# Patient Record
Sex: FEMALE | Race: OTHER | Employment: FULL TIME | URBAN - METROPOLITAN AREA
[De-identification: names, ages, dates, MRNs, and addresses within clinical notes are randomized per-mention and may not be internally consistent; named-entity substitution may affect disease eponyms.]

---

## 2019-12-30 ENCOUNTER — HOSPITAL ENCOUNTER (INPATIENT)
Age: 44
LOS: 13 days | Discharge: HOME | DRG: 305 | End: 2020-01-12
Attending: EMERGENCY MEDICINE | Admitting: INTERNAL MEDICINE
Payer: COMMERCIAL

## 2019-12-30 ENCOUNTER — APPOINTMENT (OUTPATIENT)
Dept: GENERAL RADIOLOGY | Age: 44
DRG: 305 | End: 2019-12-30
Payer: COMMERCIAL

## 2019-12-30 PROBLEM — L08.9 FOOT INFECTION: Status: ACTIVE | Noted: 2019-12-30

## 2019-12-30 LAB
ANION GAP SERPL CALCULATED.3IONS-SCNC: 12 MMOL/L (ref 9–17)
BUN BLDV-MCNC: 25 MG/DL (ref 6–20)
BUN/CREAT BLD: ABNORMAL (ref 9–20)
C-REACTIVE PROTEIN: 89.2 MG/L (ref 0–5)
CALCIUM SERPL-MCNC: 9.1 MG/DL (ref 8.6–10.4)
CHLORIDE BLD-SCNC: 92 MMOL/L (ref 98–107)
CO2: 26 MMOL/L (ref 20–31)
CREAT SERPL-MCNC: 1.04 MG/DL (ref 0.5–0.9)
GFR AFRICAN AMERICAN: >60 ML/MIN
GFR NON-AFRICAN AMERICAN: 57 ML/MIN
GFR SERPL CREATININE-BSD FRML MDRD: ABNORMAL ML/MIN/{1.73_M2}
GFR SERPL CREATININE-BSD FRML MDRD: ABNORMAL ML/MIN/{1.73_M2}
GLUCOSE BLD-MCNC: 239 MG/DL (ref 65–105)
GLUCOSE BLD-MCNC: 387 MG/DL (ref 65–105)
GLUCOSE BLD-MCNC: 524 MG/DL (ref 65–105)
GLUCOSE BLD-MCNC: 619 MG/DL (ref 70–99)
GLUCOSE BLD-MCNC: 712 MG/DL (ref 70–99)
GLUCOSE BLD-MCNC: >600 MG/DL (ref 65–105)
HCT VFR BLD CALC: 40.6 % (ref 36.3–47.1)
HEMOGLOBIN: 13.5 G/DL (ref 11.9–15.1)
MCH RBC QN AUTO: 30.3 PG (ref 25.2–33.5)
MCHC RBC AUTO-ENTMCNC: 33.3 G/DL (ref 28.4–34.8)
MCV RBC AUTO: 91.2 FL (ref 82.6–102.9)
NRBC AUTOMATED: 0 PER 100 WBC
PDW BLD-RTO: 13 % (ref 11.8–14.4)
PLATELET # BLD: 242 K/UL (ref 138–453)
PMV BLD AUTO: 11.6 FL (ref 8.1–13.5)
POTASSIUM SERPL-SCNC: 4.1 MMOL/L (ref 3.7–5.3)
RBC # BLD: 4.45 M/UL (ref 3.95–5.11)
SEDIMENTATION RATE, ERYTHROCYTE: 116 MM (ref 0–20)
SODIUM BLD-SCNC: 130 MMOL/L (ref 135–144)
WBC # BLD: 7.6 K/UL (ref 3.5–11.3)

## 2019-12-30 PROCEDURE — 99223 1ST HOSP IP/OBS HIGH 75: CPT | Performed by: NURSE PRACTITIONER

## 2019-12-30 PROCEDURE — 73630 X-RAY EXAM OF FOOT: CPT

## 2019-12-30 PROCEDURE — 2580000003 HC RX 258: Performed by: PHYSICIAN ASSISTANT

## 2019-12-30 PROCEDURE — G0378 HOSPITAL OBSERVATION PER HR: HCPCS

## 2019-12-30 PROCEDURE — 85651 RBC SED RATE NONAUTOMATED: CPT

## 2019-12-30 PROCEDURE — 1200000000 HC SEMI PRIVATE

## 2019-12-30 PROCEDURE — 96376 TX/PRO/DX INJ SAME DRUG ADON: CPT

## 2019-12-30 PROCEDURE — 80048 BASIC METABOLIC PNL TOTAL CA: CPT

## 2019-12-30 PROCEDURE — 85027 COMPLETE CBC AUTOMATED: CPT

## 2019-12-30 PROCEDURE — 6370000000 HC RX 637 (ALT 250 FOR IP): Performed by: PHYSICIAN ASSISTANT

## 2019-12-30 PROCEDURE — 99284 EMERGENCY DEPT VISIT MOD MDM: CPT

## 2019-12-30 PROCEDURE — 6370000000 HC RX 637 (ALT 250 FOR IP): Performed by: NURSE PRACTITIONER

## 2019-12-30 PROCEDURE — 96374 THER/PROPH/DIAG INJ IV PUSH: CPT

## 2019-12-30 PROCEDURE — 86140 C-REACTIVE PROTEIN: CPT

## 2019-12-30 PROCEDURE — 82947 ASSAY GLUCOSE BLOOD QUANT: CPT

## 2019-12-30 PROCEDURE — 93971 EXTREMITY STUDY: CPT

## 2019-12-30 PROCEDURE — 6360000002 HC RX W HCPCS: Performed by: PHYSICIAN ASSISTANT

## 2019-12-30 PROCEDURE — 83036 HEMOGLOBIN GLYCOSYLATED A1C: CPT

## 2019-12-30 RX ORDER — DEXTROSE MONOHYDRATE 25 G/50ML
12.5 INJECTION, SOLUTION INTRAVENOUS PRN
Status: DISCONTINUED | OUTPATIENT
Start: 2019-12-30 | End: 2020-01-12 | Stop reason: HOSPADM

## 2019-12-30 RX ORDER — ACETAMINOPHEN 325 MG/1
650 TABLET ORAL EVERY 4 HOURS PRN
Status: DISCONTINUED | OUTPATIENT
Start: 2019-12-30 | End: 2020-01-12 | Stop reason: HOSPADM

## 2019-12-30 RX ORDER — CARVEDILOL 12.5 MG/1
12.5 TABLET ORAL 2 TIMES DAILY WITH MEALS
Status: DISCONTINUED | OUTPATIENT
Start: 2019-12-31 | End: 2019-12-31 | Stop reason: SDUPTHER

## 2019-12-30 RX ORDER — LISINOPRIL AND HYDROCHLOROTHIAZIDE 25; 20 MG/1; MG/1
1 TABLET ORAL DAILY
Status: DISCONTINUED | OUTPATIENT
Start: 2019-12-31 | End: 2020-01-06

## 2019-12-30 RX ORDER — VANCOMYCIN HYDROCHLORIDE 1 G/200ML
1000 INJECTION, SOLUTION INTRAVENOUS EVERY 12 HOURS
Status: DISCONTINUED | OUTPATIENT
Start: 2019-12-30 | End: 2019-12-31 | Stop reason: SDUPTHER

## 2019-12-30 RX ORDER — SIMVASTATIN 20 MG
20 TABLET ORAL NIGHTLY
Status: DISCONTINUED | OUTPATIENT
Start: 2019-12-30 | End: 2020-01-11

## 2019-12-30 RX ORDER — SODIUM CHLORIDE 9 MG/ML
INJECTION, SOLUTION INTRAVENOUS CONTINUOUS
Status: DISCONTINUED | OUTPATIENT
Start: 2019-12-30 | End: 2020-01-02

## 2019-12-30 RX ORDER — 0.9 % SODIUM CHLORIDE 0.9 %
1000 INTRAVENOUS SOLUTION INTRAVENOUS ONCE
Status: COMPLETED | OUTPATIENT
Start: 2019-12-30 | End: 2019-12-30

## 2019-12-30 RX ORDER — CARVEDILOL 12.5 MG/1
12.5 TABLET ORAL ONCE
Status: COMPLETED | OUTPATIENT
Start: 2019-12-30 | End: 2019-12-30

## 2019-12-30 RX ORDER — ASPIRIN 81 MG/1
81 TABLET, CHEWABLE ORAL DAILY
Status: DISCONTINUED | OUTPATIENT
Start: 2019-12-30 | End: 2020-01-12 | Stop reason: HOSPADM

## 2019-12-30 RX ORDER — NICOTINE POLACRILEX 4 MG
15 LOZENGE BUCCAL PRN
Status: DISCONTINUED | OUTPATIENT
Start: 2019-12-30 | End: 2020-01-12 | Stop reason: HOSPADM

## 2019-12-30 RX ORDER — DEXTROSE MONOHYDRATE 50 MG/ML
100 INJECTION, SOLUTION INTRAVENOUS PRN
Status: DISCONTINUED | OUTPATIENT
Start: 2019-12-30 | End: 2020-01-12 | Stop reason: HOSPADM

## 2019-12-30 RX ORDER — ONDANSETRON 2 MG/ML
4 INJECTION INTRAMUSCULAR; INTRAVENOUS EVERY 6 HOURS PRN
Status: DISCONTINUED | OUTPATIENT
Start: 2019-12-30 | End: 2020-01-12 | Stop reason: HOSPADM

## 2019-12-30 RX ORDER — INSULIN GLARGINE 100 [IU]/ML
0.25 INJECTION, SOLUTION SUBCUTANEOUS NIGHTLY
Status: DISCONTINUED | OUTPATIENT
Start: 2019-12-30 | End: 2019-12-31

## 2019-12-30 RX ORDER — LEVOTHYROXINE SODIUM 0.05 MG/1
50 TABLET ORAL DAILY
Status: DISCONTINUED | OUTPATIENT
Start: 2019-12-31 | End: 2020-01-11

## 2019-12-30 RX ORDER — IBUPROFEN 800 MG/1
800 TABLET ORAL EVERY 8 HOURS PRN
Status: DISCONTINUED | OUTPATIENT
Start: 2019-12-30 | End: 2020-01-04

## 2019-12-30 RX ORDER — ACETAMINOPHEN 325 MG/1
650 TABLET ORAL ONCE
Status: COMPLETED | OUTPATIENT
Start: 2019-12-30 | End: 2019-12-30

## 2019-12-30 RX ORDER — ACETAMINOPHEN 325 MG/1
650 TABLET ORAL EVERY 8 HOURS PRN
Status: DISCONTINUED | OUTPATIENT
Start: 2019-12-30 | End: 2019-12-30

## 2019-12-30 RX ORDER — LISINOPRIL AND HYDROCHLOROTHIAZIDE 25; 20 MG/1; MG/1
1 TABLET ORAL DAILY
Status: DISCONTINUED | OUTPATIENT
Start: 2019-12-30 | End: 2019-12-30

## 2019-12-30 RX ADMIN — IBUPROFEN 800 MG: 800 TABLET, FILM COATED ORAL at 23:06

## 2019-12-30 RX ADMIN — INSULIN LISPRO 7 UNITS: 100 INJECTION, SOLUTION INTRAVENOUS; SUBCUTANEOUS at 17:03

## 2019-12-30 RX ADMIN — ASPIRIN 81 MG 81 MG: 81 TABLET ORAL at 23:06

## 2019-12-30 RX ADMIN — INSULIN LISPRO 10 UNITS: 100 INJECTION, SOLUTION INTRAVENOUS; SUBCUTANEOUS at 15:50

## 2019-12-30 RX ADMIN — LISINOPRIL AND HYDROCHLOROTHIAZIDE 1 TABLET: 25; 20 TABLET ORAL at 17:35

## 2019-12-30 RX ADMIN — SODIUM CHLORIDE 1000 ML: 9 INJECTION, SOLUTION INTRAVENOUS at 17:03

## 2019-12-30 RX ADMIN — ACETAMINOPHEN 650 MG: 325 TABLET ORAL at 23:06

## 2019-12-30 RX ADMIN — INSULIN LISPRO 10 UNITS: 100 INJECTION, SOLUTION INTRAVENOUS; SUBCUTANEOUS at 19:25

## 2019-12-30 RX ADMIN — CARVEDILOL 12.5 MG: 12.5 TABLET, FILM COATED ORAL at 17:03

## 2019-12-30 RX ADMIN — INSULIN LISPRO 10 UNITS: 100 INJECTION, SOLUTION INTRAVENOUS; SUBCUTANEOUS at 18:20

## 2019-12-30 RX ADMIN — VANCOMYCIN HYDROCHLORIDE 1750 MG: 1 INJECTION, POWDER, LYOPHILIZED, FOR SOLUTION INTRAVENOUS at 17:03

## 2019-12-30 RX ADMIN — ACETAMINOPHEN 650 MG: 325 TABLET ORAL at 15:50

## 2019-12-30 RX ADMIN — SODIUM CHLORIDE 1000 ML: 9 INJECTION, SOLUTION INTRAVENOUS at 19:26

## 2019-12-30 RX ADMIN — SODIUM CHLORIDE 1000 ML: 9 INJECTION, SOLUTION INTRAVENOUS at 15:49

## 2019-12-30 ASSESSMENT — ENCOUNTER SYMPTOMS
EYE PAIN: 0
BACK PAIN: 0
RHINORRHEA: 0
WHEEZING: 0
SORE THROAT: 0
EYE ITCHING: 0
EYE DISCHARGE: 0
NAUSEA: 0
COUGH: 0
COLOR CHANGE: 1
VOMITING: 0
ABDOMINAL PAIN: 0

## 2019-12-30 ASSESSMENT — PAIN DESCRIPTION - FREQUENCY: FREQUENCY: CONTINUOUS

## 2019-12-30 ASSESSMENT — PAIN DESCRIPTION - ORIENTATION: ORIENTATION: RIGHT

## 2019-12-30 ASSESSMENT — PAIN SCALES - GENERAL
PAINLEVEL_OUTOF10: 8
PAINLEVEL_OUTOF10: 7
PAINLEVEL_OUTOF10: 7

## 2019-12-30 ASSESSMENT — PAIN DESCRIPTION - LOCATION: LOCATION: FOOT

## 2019-12-30 NOTE — ED PROVIDER NOTES
[facility]  ED  Emergency Department Encounter  Advanced Practice Provider     Pt Name: Chalino Youngblood  MRN: [id number]  [place] 1975  Date of evaluation: 12/30/19  PCP:  KAYLEE Wagner CNP    CHIEF COMPLAINT       Chief Complaint   Patient presents with    Foot Pain     complaining of right foot pain to bottom of foot near crease of great toe and lateral right foot, she states that she started to notice swelling to dorsal aspect of foot with mild redness and pain    Leg Pain     complaining of right lower extremity pain to right medial aspect        HISTORY OF PRESENT ILLNESS  (Location/Symptom, Timing/Onset,Context/Setting, Quality, Duration, Modifying Factors, Severity.)      Chalino Youngblood is a 38 y.o. female who presents with right foot pain and swelling. Patient states that last night she started noticing swelling to the dorsum of the right foot. Later in the evening she noticed some erythema and then today has been having some pain. She adamantly denies any injury to the foot. She does report that she has had a previous history of pulmonary embolism as well as clots in the venous system and arterial system of her legs. She reports that she does have some arterial stents placed with the last one being about 2 years ago. Patient states that she is supposed to be on anticoagulation but ran out of her medicine about 6 months ago and was unable to fill it due to insurance issues. Patient is diabetic, type II, has not been checking her sugar and again has been off of her metformin. She does report that she has been taking her blood pressure medicine however. Patient states that she is able to ambulate. She denies any fevers or chills.     PAST MEDICAL /SURGICAL / SOCIAL / FAMILY HISTORY      has a past medical history of Hyperlipidemia, Hypertension, Hypothyroidism, Type II or unspecified type diabetes mellitus without mention of complication, not stated as uncontrolled, carvedilol (COREG) 12.5 MG tablet Take 1 tablet by mouth 2 times daily (with meals) Not sure if using 7/25/16   Geronimo Clark MD   lisinopril-hydrochlorothiazide (PRINZIDE;ZESTORETIC) 20-25 MG per tablet Take 1 tablet by mouth daily 40/25 7/25/16   Geronimo Clark MD   metFORMIN (GLUCOPHAGE) 1000 MG tablet Take 1 tablet by mouth 2 times daily (with meals) 7/25/16   Geronimo Clark MD   glucose monitoring kit (FREESTYLE) monitoring kit 1 kit by Does not apply route daily as needed (monitor blood glucose levels) 7/25/16   Kartik Velasquez MD   Glucose Blood (BLOOD GLUCOSE TEST STRIPS) STRP Check blood sugars three times daily 7/25/16   Kartik Velasquez MD   benzocaine-menthol (CEPACOL SORE THROAT EX ST) 15-3.6 MG lozenge Take 1 lozenge by mouth 3 times daily as needed for Sore Throat 2/23/16   Braxton Cunningham,    ibuprofen (ADVIL;MOTRIN) 800 MG tablet Take 1 tablet by mouth every 8 hours as needed for Pain 1/16/16   Hao Clark,    aspirin 81 MG chewable tablet Take 1 tablet by mouth daily. 3/20/15   Precious Leal MD   gabapentin (NEURONTIN) 300 MG capsule Take 1 capsule by mouth 3 times daily. 3/20/15   Precious Leal MD   simvastatin (ZOCOR) 20 MG tablet Take 1 tablet by mouth nightly. 3/20/15   Precious Leal MD   nystatin (MYCOSTATIN) 653801 UNIT/GM powder Apply topically 4 times daily. 3/20/15   Precious Leal MD   Blood Glucose Monitoring Suppl (BLOOD GLUCOSE METER) KIT Test 3 times daily Dx: 250.00  Diabetes Mellitus Non-Insulin Dependent 3/20/15   Precious Leal MD   Blood Pressure KIT Use as directed 3/20/15   Precious Leal MD       patient's medication list has been reviewed as entered by the nursing staff. REVIEW OF SYSTEMS    (2-9 systems for level 4, 10 or more for level 5)      Review of Systems   Constitutional: Negative for chills and fever. HENT: Negative for ear pain, rhinorrhea and sore throat. Eyes: Negative for pain, discharge and itching. Respiratory: Negative for cough and wheezing. Cardiovascular: Negative for chest pain and palpitations. Gastrointestinal: Negative for abdominal pain, nausea and vomiting. Genitourinary: Negative for difficulty urinating and dysuria. Musculoskeletal: Positive for myalgias. Negative for back pain. Skin: Positive for color change and wound. Neurological: Negative for dizziness and headaches. Psychiatric/Behavioral: Negative for dysphoric mood. PHYSICAL EXAM  (up to 7 for level 4, 8 or more for level 5)      INITIAL VITALS:  height is 5' 1\" (1.549 m) and weight is 240 lb (108.9 kg). Her oral temperature is 98.2 °F (36.8 °C). Her blood pressure is 156/104 (abnormal) and her pulse is 94. Her respiration is 16 and oxygen saturation is 99%. Physical Exam  Constitutional:       Appearance: She is well-developed. She is not diaphoretic. HENT:      Head: Normocephalic and atraumatic. Right Ear: External ear normal.      Left Ear: External ear normal.   Eyes:      General: No scleral icterus. Right eye: No discharge. Left eye: No discharge. Neck:      Trachea: No tracheal deviation. Pulmonary:      Effort: Pulmonary effort is normal. No respiratory distress. Breath sounds: No stridor. Musculoskeletal: Normal range of motion. Comments: On the medial aspect of the right great toe there is a large area of erythema, well demarcated, there is some fluctuance on the plantar surface of the foot and over the first MTP joint. There is no active drainage. There is a healing wound to the lateral portion of the foot. There is no surrounding erythema. There is some erythema onto the dorsum of the foot in the webspace between the great toe and second toe. Dorsalis pedis pulse is palpable. There is pain on palpation of the posterior calf with no calf size discrepancy   Skin:     General: Skin is warm and dry.    Neurological:      Mental Status: She is alert and oriented to person, place, and time.       Coordination: Coordination normal.   Psychiatric:         Behavior: Behavior normal.           PLAN (LABS / IMAGING / EKG):  Orders Placed This Encounter   Procedures    VL DUP LOWER EXTREMITY VENOUS RIGHT    XR FOOT RIGHT (MIN 3 VIEWS)    CBC    BASIC METABOLIC PANEL    C-REACTIVE PROTEIN    Sedimentation Rate    Glucose, random    Hemoglobin A1C    Vital signs    Vital signs    HYPOGLYCEMIA TREATMENT: blood glucose less than 50 mg/dL and patient  ALERT and TOLERATING PO    HYPOGLYCEMIA TREATMENT: blood glucose less than 70 mg/dL and patient ALERT and TOLERATING PO    HYPOGLYCEMIA TREATMENT: blood glucose less than 70 mg/dL and patient NOT ALERT or NPO    Full Code    Pharmacy to Dose: Vancomycin    Inpatient consult to [address] [place] [address] St to Dose: Vancomycin    POCT Glucose    POC Glucose Fingerstick    POC Glucose Fingerstick    POCT glucose    POCT Glucose    POC Glucose Fingerstick    POCT Glucose    POC Glucose Fingerstick    POC Glucose Fingerstick    Insert peripheral IV    PATIENT STATUS (FROM ED OR OR/PROCEDURAL) Observation    PATIENT STATUS (FROM ED OR OR/PROCEDURAL) Inpatient       MEDICATIONS ORDERED:  Orders Placed This Encounter   Medications    0.9 % sodium chloride bolus    insulin lispro (HUMALOG) injection vial 10 Units    acetaminophen (TYLENOL) tablet 650 mg    vancomycin (VANCOCIN) 1750 mg in dextrose 5% 500 mL IVPB    aspirin chewable tablet 81 mg    levothyroxine (SYNTHROID) tablet 50 mcg    lisinopril-hydrochlorothiazide (PRINZIDE;ZESTORETIC) 20-25 MG per tablet 1 tablet    metFORMIN (GLUCOPHAGE) tablet 1,000 mg    simvastatin (ZOCOR) tablet 20 mg    DISCONTD: vancomycin (VANCOCIN) 1750 mg in dextrose 5% 500 mL IVPB    DISCONTD: acetaminophen (TYLENOL) tablet 650 mg    ibuprofen (ADVIL;MOTRIN) tablet 800 mg    DISCONTD: insulin lispro (HUMALOG) injection vial 0-6 Units    DISCONTD: insulin lispro (HUMALOG)

## 2019-12-30 NOTE — ED PROVIDER NOTES
[facility]     Emergency Department     Faculty Attestation        I performed a history and physical examination of the patient and discussed management with the resident. I reviewed the residents note and agree with the documented findings and plan of care. Any areas of disagreement are noted on the chart. I was personally present for the key portions of any procedures. I have documented in the chart those procedures where I was not present during the key portions. I have reviewed the emergency nurses triage note. I agree with the chief complaint, past medical history, past surgical history, allergies, medications, social and family history as documented unless otherwise noted below. For Physician Assistant/ Nurse Practitioner cases/documentation I have personally evaluated this patient and have completed at least one if not all key elements of the E/M (history, physical exam, and MDM). Additional findings are as noted. Vital Signs: BP: (!) 192/104  Pulse: 95  Resp: 16  Temp: 98.2 °F (36.8 °C) SpO2: 99 %  PCP:  Nelda Reza, APRN - CNP    Pertinent Comments:     Patient is a 44-year-old female with significant peripheral vascular disease with arterial stents in the past as well as DVTs in the past supposed to be anticoagulated on Eliquis as well as on Brilinta but not compliant with this secondary to possible insurance recently. Patient on her right foot does have area on the dorsum that appears to be cellulitic with strong DP/PT pulses palpated and no obvious arterial side clot. Plan: We will obtain x-ray of the area as well as Doppler of the lower extremity and baseline laboratories and very possible simple cellulitis    Critical Care  None      (Please note that portions of this note were completed with a voice recognition program. Efforts were made to edit the dictations but occasionally words are mis-transcribed.  Whenever words are

## 2019-12-31 ENCOUNTER — APPOINTMENT (OUTPATIENT)
Dept: MRI IMAGING | Age: 44
DRG: 305 | End: 2019-12-31
Payer: COMMERCIAL

## 2019-12-31 PROBLEM — Z86.718 H/O DEEP VENOUS THROMBOSIS: Status: ACTIVE | Noted: 2019-12-31

## 2019-12-31 PROBLEM — Z91.14 NON COMPLIANCE W MEDICATION REGIMEN: Status: ACTIVE | Noted: 2019-12-31

## 2019-12-31 PROBLEM — R73.9 HYPERGLYCEMIA: Status: ACTIVE | Noted: 2019-12-31

## 2019-12-31 LAB
C-REACTIVE PROTEIN: 151.5 MG/L (ref 0–5)
CULTURE: NORMAL
DIRECT EXAM: NORMAL
DIRECT EXAM: NORMAL
ESTIMATED AVERAGE GLUCOSE: 413 MG/DL
FOLATE: 13.5 NG/ML
GLUCOSE BLD-MCNC: 226 MG/DL (ref 65–105)
GLUCOSE BLD-MCNC: 252 MG/DL (ref 65–105)
GLUCOSE BLD-MCNC: 301 MG/DL (ref 65–105)
GLUCOSE BLD-MCNC: 343 MG/DL (ref 65–105)
GLUCOSE BLD-MCNC: 404 MG/DL (ref 65–105)
HBA1C MFR BLD: 16 % (ref 4–6)
Lab: NORMAL
Lab: NORMAL
PROCALCITONIN: 0.19 NG/ML
PTH INTACT: 63.22 PG/ML (ref 15–65)
SPECIMEN DESCRIPTION: NORMAL
SPECIMEN DESCRIPTION: NORMAL
THYROXINE, FREE: 0.46 NG/DL (ref 0.93–1.7)
TSH SERPL DL<=0.05 MIU/L-ACNC: 48.78 MIU/L (ref 0.3–5)
URIC ACID: 4 MG/DL (ref 2.4–5.7)
VITAMIN B-12: 708 PG/ML (ref 232–1245)
VITAMIN D 25-HYDROXY: <5 NG/ML (ref 30–100)

## 2019-12-31 PROCEDURE — 36415 COLL VENOUS BLD VENIPUNCTURE: CPT

## 2019-12-31 PROCEDURE — 1200000000 HC SEMI PRIVATE

## 2019-12-31 PROCEDURE — 6360000002 HC RX W HCPCS: Performed by: EMERGENCY MEDICINE

## 2019-12-31 PROCEDURE — 99232 SBSQ HOSP IP/OBS MODERATE 35: CPT | Performed by: FAMILY MEDICINE

## 2019-12-31 PROCEDURE — A9576 INJ PROHANCE MULTIPACK: HCPCS | Performed by: STUDENT IN AN ORGANIZED HEALTH CARE EDUCATION/TRAINING PROGRAM

## 2019-12-31 PROCEDURE — 82306 VITAMIN D 25 HYDROXY: CPT

## 2019-12-31 PROCEDURE — 86403 PARTICLE AGGLUT ANTBDY SCRN: CPT

## 2019-12-31 PROCEDURE — 96376 TX/PRO/DX INJ SAME DRUG ADON: CPT

## 2019-12-31 PROCEDURE — 84439 ASSAY OF FREE THYROXINE: CPT

## 2019-12-31 PROCEDURE — 6360000004 HC RX CONTRAST MEDICATION: Performed by: STUDENT IN AN ORGANIZED HEALTH CARE EDUCATION/TRAINING PROGRAM

## 2019-12-31 PROCEDURE — 6370000000 HC RX 637 (ALT 250 FOR IP): Performed by: PHYSICIAN ASSISTANT

## 2019-12-31 PROCEDURE — 82746 ASSAY OF FOLIC ACID SERUM: CPT

## 2019-12-31 PROCEDURE — 76937 US GUIDE VASCULAR ACCESS: CPT

## 2019-12-31 PROCEDURE — 2580000003 HC RX 258: Performed by: EMERGENCY MEDICINE

## 2019-12-31 PROCEDURE — 83970 ASSAY OF PARATHORMONE: CPT

## 2019-12-31 PROCEDURE — 99222 1ST HOSP IP/OBS MODERATE 55: CPT | Performed by: INTERNAL MEDICINE

## 2019-12-31 PROCEDURE — 87075 CULTR BACTERIA EXCEPT BLOOD: CPT

## 2019-12-31 PROCEDURE — 6370000000 HC RX 637 (ALT 250 FOR IP): Performed by: NURSE PRACTITIONER

## 2019-12-31 PROCEDURE — 87185 SC STD ENZYME DETCJ PER NZM: CPT

## 2019-12-31 PROCEDURE — 86140 C-REACTIVE PROTEIN: CPT

## 2019-12-31 PROCEDURE — 73720 MRI LWR EXTREMITY W/O&W/DYE: CPT

## 2019-12-31 PROCEDURE — 6360000002 HC RX W HCPCS: Performed by: FAMILY MEDICINE

## 2019-12-31 PROCEDURE — 96375 TX/PRO/DX INJ NEW DRUG ADDON: CPT

## 2019-12-31 PROCEDURE — 82947 ASSAY GLUCOSE BLOOD QUANT: CPT

## 2019-12-31 PROCEDURE — 87040 BLOOD CULTURE FOR BACTERIA: CPT

## 2019-12-31 PROCEDURE — 87070 CULTURE OTHR SPECIMN AEROBIC: CPT

## 2019-12-31 PROCEDURE — 87076 CULTURE ANAEROBE IDENT EACH: CPT

## 2019-12-31 PROCEDURE — 99221 1ST HOSP IP/OBS SF/LOW 40: CPT | Performed by: PODIATRIST

## 2019-12-31 PROCEDURE — 84550 ASSAY OF BLOOD/URIC ACID: CPT

## 2019-12-31 PROCEDURE — 84145 PROCALCITONIN (PCT): CPT

## 2019-12-31 PROCEDURE — 2580000003 HC RX 258: Performed by: NURSE PRACTITIONER

## 2019-12-31 PROCEDURE — 84443 ASSAY THYROID STIM HORMONE: CPT

## 2019-12-31 PROCEDURE — 6360000002 HC RX W HCPCS: Performed by: PHYSICIAN ASSISTANT

## 2019-12-31 PROCEDURE — 2580000003 HC RX 258: Performed by: PHYSICIAN ASSISTANT

## 2019-12-31 PROCEDURE — 82607 VITAMIN B-12: CPT

## 2019-12-31 PROCEDURE — 87205 SMEAR GRAM STAIN: CPT

## 2019-12-31 PROCEDURE — 6370000000 HC RX 637 (ALT 250 FOR IP): Performed by: FAMILY MEDICINE

## 2019-12-31 PROCEDURE — 87147 CULTURE TYPE IMMUNOLOGIC: CPT

## 2019-12-31 RX ORDER — SODIUM CHLORIDE 0.9 % (FLUSH) 0.9 %
10 SYRINGE (ML) INJECTION EVERY 12 HOURS SCHEDULED
Status: DISCONTINUED | OUTPATIENT
Start: 2019-12-31 | End: 2020-01-05 | Stop reason: SDUPTHER

## 2019-12-31 RX ORDER — SODIUM CHLORIDE 0.9 % (FLUSH) 0.9 %
10 SYRINGE (ML) INJECTION PRN
Status: DISCONTINUED | OUTPATIENT
Start: 2019-12-31 | End: 2020-01-12 | Stop reason: HOSPADM

## 2019-12-31 RX ORDER — SODIUM CHLORIDE 0.9 % (FLUSH) 0.9 %
10 SYRINGE (ML) INJECTION 2 TIMES DAILY
Status: DISCONTINUED | OUTPATIENT
Start: 2019-12-31 | End: 2020-01-05 | Stop reason: SDUPTHER

## 2019-12-31 RX ORDER — POTASSIUM CHLORIDE 20 MEQ/1
40 TABLET, EXTENDED RELEASE ORAL PRN
Status: DISCONTINUED | OUTPATIENT
Start: 2019-12-31 | End: 2020-01-12 | Stop reason: HOSPADM

## 2019-12-31 RX ORDER — NICOTINE 21 MG/24HR
1 PATCH, TRANSDERMAL 24 HOURS TRANSDERMAL DAILY PRN
Status: DISCONTINUED | OUTPATIENT
Start: 2019-12-31 | End: 2020-01-12 | Stop reason: HOSPADM

## 2019-12-31 RX ORDER — POTASSIUM CHLORIDE 7.45 MG/ML
10 INJECTION INTRAVENOUS PRN
Status: DISCONTINUED | OUTPATIENT
Start: 2019-12-31 | End: 2020-01-12 | Stop reason: HOSPADM

## 2019-12-31 RX ORDER — SODIUM CHLORIDE 9 MG/ML
INJECTION, SOLUTION INTRAVENOUS CONTINUOUS
Status: DISCONTINUED | OUTPATIENT
Start: 2019-12-31 | End: 2020-01-11

## 2019-12-31 RX ORDER — INSULIN GLARGINE 100 [IU]/ML
40 INJECTION, SOLUTION SUBCUTANEOUS NIGHTLY
Status: DISCONTINUED | OUTPATIENT
Start: 2019-12-31 | End: 2020-01-08

## 2019-12-31 RX ORDER — MAGNESIUM SULFATE 1 G/100ML
1 INJECTION INTRAVENOUS PRN
Status: DISCONTINUED | OUTPATIENT
Start: 2019-12-31 | End: 2020-01-12 | Stop reason: HOSPADM

## 2019-12-31 RX ORDER — CARVEDILOL 12.5 MG/1
12.5 TABLET ORAL 2 TIMES DAILY WITH MEALS
Status: DISCONTINUED | OUTPATIENT
Start: 2019-12-31 | End: 2020-01-11

## 2019-12-31 RX ADMIN — LISINOPRIL AND HYDROCHLOROTHIAZIDE 1 TABLET: 25; 20 TABLET ORAL at 13:45

## 2019-12-31 RX ADMIN — ASPIRIN 81 MG 81 MG: 81 TABLET ORAL at 10:37

## 2019-12-31 RX ADMIN — APIXABAN 2.5 MG: 2.5 TABLET, FILM COATED ORAL at 21:09

## 2019-12-31 RX ADMIN — SODIUM CHLORIDE, PRESERVATIVE FREE 10 ML: 5 INJECTION INTRAVENOUS at 10:46

## 2019-12-31 RX ADMIN — METFORMIN HYDROCHLORIDE 1000 MG: 500 TABLET ORAL at 10:37

## 2019-12-31 RX ADMIN — SIMVASTATIN 20 MG: 20 TABLET, FILM COATED ORAL at 21:09

## 2019-12-31 RX ADMIN — APIXABAN 2.5 MG: 2.5 TABLET, FILM COATED ORAL at 00:11

## 2019-12-31 RX ADMIN — VANCOMYCIN HYDROCHLORIDE 1750 MG: 10 INJECTION, POWDER, LYOPHILIZED, FOR SOLUTION INTRAVENOUS at 18:22

## 2019-12-31 RX ADMIN — INSULIN LISPRO 12 UNITS: 100 INJECTION, SOLUTION INTRAVENOUS; SUBCUTANEOUS at 18:22

## 2019-12-31 RX ADMIN — APIXABAN 2.5 MG: 2.5 TABLET, FILM COATED ORAL at 10:37

## 2019-12-31 RX ADMIN — HYDROMORPHONE HYDROCHLORIDE 0.5 MG: 1 INJECTION, SOLUTION INTRAMUSCULAR; INTRAVENOUS; SUBCUTANEOUS at 21:10

## 2019-12-31 RX ADMIN — SIMVASTATIN 20 MG: 20 TABLET, FILM COATED ORAL at 01:10

## 2019-12-31 RX ADMIN — SODIUM CHLORIDE, PRESERVATIVE FREE 10 ML: 5 INJECTION INTRAVENOUS at 21:11

## 2019-12-31 RX ADMIN — INSULIN LISPRO 3 UNITS: 100 INJECTION, SOLUTION INTRAVENOUS; SUBCUTANEOUS at 00:10

## 2019-12-31 RX ADMIN — HYDROMORPHONE HYDROCHLORIDE 0.5 MG: 1 INJECTION, SOLUTION INTRAMUSCULAR; INTRAVENOUS; SUBCUTANEOUS at 14:26

## 2019-12-31 RX ADMIN — INSULIN LISPRO 6 UNITS: 100 INJECTION, SOLUTION INTRAVENOUS; SUBCUTANEOUS at 21:10

## 2019-12-31 RX ADMIN — INSULIN GLARGINE 27 UNITS: 100 INJECTION, SOLUTION SUBCUTANEOUS at 00:09

## 2019-12-31 RX ADMIN — LEVOTHYROXINE SODIUM 50 MCG: 100 TABLET ORAL at 10:38

## 2019-12-31 RX ADMIN — INSULIN LISPRO 18 UNITS: 100 INJECTION, SOLUTION INTRAVENOUS; SUBCUTANEOUS at 12:59

## 2019-12-31 RX ADMIN — SODIUM CHLORIDE: 9 INJECTION, SOLUTION INTRAVENOUS at 01:27

## 2019-12-31 RX ADMIN — INSULIN GLARGINE 40 UNITS: 100 INJECTION, SOLUTION SUBCUTANEOUS at 21:09

## 2019-12-31 RX ADMIN — CARVEDILOL 12.5 MG: 12.5 TABLET, FILM COATED ORAL at 18:21

## 2019-12-31 RX ADMIN — SODIUM CHLORIDE: 9 INJECTION, SOLUTION INTRAVENOUS at 10:46

## 2019-12-31 RX ADMIN — INSULIN LISPRO 9 UNITS: 100 INJECTION, SOLUTION INTRAVENOUS; SUBCUTANEOUS at 10:38

## 2019-12-31 RX ADMIN — GADOTERIDOL 20 ML: 279.3 INJECTION, SOLUTION INTRAVENOUS at 17:24

## 2019-12-31 RX ADMIN — CARVEDILOL 12.5 MG: 12.5 TABLET, FILM COATED ORAL at 10:37

## 2019-12-31 RX ADMIN — PIPERACILLIN AND TAZOBACTAM 3.38 G: 3; .375 INJECTION, POWDER, FOR SOLUTION INTRAVENOUS at 01:27

## 2019-12-31 ASSESSMENT — PAIN SCALES - GENERAL
PAINLEVEL_OUTOF10: 10
PAINLEVEL_OUTOF10: 9

## 2019-12-31 NOTE — ED PROVIDER NOTES
Patient is a diabetic with sugars over 700 patient has fluid and insulin admitted at this time with antibiotics for cellulitis patient also receiving Pip/tazo and podiatry consult     Freddie Rivera DO  12/31/19 0240

## 2019-12-31 NOTE — H&P
[facility]    HISTORY AND PHYSICAL EXAMINATION            Date:   12/31/2019  Patient name:  Chalino Youngblood  Date of admission:  12/30/2019  2:09 PM  MRN:   [id number]  Account:  [de-identified]  YOB: 1975  PCP:    KAYLEE Wagner CNP  Room:   02/02  Code Status:    Full Code    Chief Complaint:     Chief Complaint   Patient presents with    Foot Pain     complaining of right foot pain to bottom of foot near crease of great toe and lateral right foot, she states that she started to notice swelling to dorsal aspect of foot with mild redness and pain    Leg Pain     complaining of right lower extremity pain to right medial aspect        History Obtained From:     patient, electronic medical record    History of Present Illness:     Chalino Youngblood is a 38 y.o. / female who presents with Foot Pain (complaining of right foot pain to bottom of foot near crease of great toe and lateral right foot, she states that she started to notice swelling to dorsal aspect of foot with mild redness and pain) and Leg Pain (complaining of right lower extremity pain to right medial aspect )   and is admitted to the hospital for the management of Foot infection. Patient is a 38 yr old female who presents to ED with c/p Right foot pain. Right great toe noted to be deep red/pruple in color with swelling extending into dorsum of foot along with red/purple coloring. Pulses are palpable, brisk cap refill, and warmth noted. Ms. Ree Aceves denies any injury to the affected toe/foot, denies any fevers, chills, N/V/D andstates the symptoms have been ongoing for approx 24 hrs. She has baseline neuropathy and denies any changes in N/T. Presenting labs with elevated inflammatory markers along with significant Hyperglycemia, no gas on radiological imaging and doppler negative for DVT.     PMH significant for DVT (non-compliant with Xarelto secondary to MD   benzocaine-menthol (CEPACOL SORE THROAT EX ST) 15-3.6 MG lozenge Take 1 lozenge by mouth 3 times daily as needed for Sore Throat 2/23/16   Shawn Pate, DO   ibuprofen (ADVIL;MOTRIN) 800 MG tablet Take 1 tablet by mouth every 8 hours as needed for Pain 1/16/16   Shaista Lozaan, DO   aspirin 81 MG chewable tablet Take 1 tablet by mouth daily. 3/20/15   Priti Vizcarra MD   gabapentin (NEURONTIN) 300 MG capsule Take 1 capsule by mouth 3 times daily. 3/20/15   Priti Vizcarra MD   simvastatin (ZOCOR) 20 MG tablet Take 1 tablet by mouth nightly. 3/20/15   Priti Vizcarra MD   nystatin (MYCOSTATIN) 734154 UNIT/GM powder Apply topically 4 times daily. 3/20/15   Priti Vizcarra MD   Blood Glucose Monitoring Suppl (BLOOD GLUCOSE METER) KIT Test 3 times daily Dx: 250.00  Diabetes Mellitus Non-Insulin Dependent 3/20/15   Priti Vizacrra MD   Blood Pressure KIT Use as directed 3/20/15   Priti Vizcarra MD        Allergies:     Patient has no known allergies. Social History:     Tobacco:    reports that she has quit smoking. She smoked 0.10 packs per day. She does not have any smokeless tobacco history on file. Alcohol:      reports no history of alcohol use. Drug Use:  reports no history of drug use. Family History:     Family History   Problem Relation Age of Onset    Stroke Father     Heart Disease Other        Review of Systems:     Positive and Negative as described in HPI.     CONSTITUTIONAL:  negative for fevers, chills, sweats, fatigue, weight loss  HEENT:  negative for vision, hearing changes, runny nose, throat pain  RESPIRATORY:  negative for shortness of breath, cough, congestion, wheezing  CARDIOVASCULAR:  negative for chest pain, palpitations  GASTROINTESTINAL:  negative for nausea, vomiting, diarrhea, constipation, change in bowel habits, abdominal pain   GENITOURINARY:  negative for difficulty of urination, burning with urination, frequency   INTEGUMENT:  negative for rash, skin lesions, easy bruising   HEMATOLOGIC/LYMPHATIC:  negative for swelling/edema   ALLERGIC/IMMUNOLOGIC:  negative for urticaria , itching  ENDOCRINE:  negative increase in drinking, increase in urination, hot or cold intolerance  MUSCULOSKELETAL:  Right foot pian, with swelling and right great toe with color changes  NEUROLOGICAL:  negative for headaches, dizziness, lightheadedness, numbness, pain, tingling extremities  BEHAVIOR/PSYCH:  negative for depression, anxiety    Physical Exam:   BP (!) 156/104   Pulse 94   Temp 98.2 °F (36.8 °C) (Oral)   Resp 16   Ht 5' 1\" (1.549 m)   Wt 240 lb (108.9 kg)   SpO2 99%   BMI 45.35 kg/m²   Temp (24hrs), Av.2 °F (36.8 °C), Min:98.2 °F (36.8 °C), Max:98.2 °F (36.8 °C)    Recent Labs     19  1900 19  2019 19  2209 19  0009   POCGLU 524* 387* 239* 226*     No intake or output data in the 24 hours ending 19 0036    General Appearance: alert, well appearing, and in no acute distress  Mental status: oriented to person, place, and time  Head: normocephalic, atraumatic  Eye: no icterus, redness, pupils equal and reactive, extraocular eye movements intact, conjunctiva clear  Ear: normal external ear, no discharge, hearing intact  Nose: no drainage noted  Mouth: mucous membranes moist  Neck: supple, no carotid bruits, thyroid not palpable  Lungs: Bilateral equal air entry, clear to ausculation, no wheezing, rales or rhonchi, normal effort  Cardiovascular: normal rate, regular rhythm, no murmur, gallop, rub  Abdomen: Soft, nontender, nondistended, normal bowel sounds, no hepatomegaly or splenomegaly  Neurologic: There are no new focal motor or sensory deficits, normal muscle tone and bulk, no abnormal sensation, normal speech, cranial nerves II through XII grossly intact  Skin: No gross lesions, rashes, bruising or bleeding on exposed skin area  Extremities: peripheral pulses palpable, 1+ edema noted to right foot along with purple/red discoloring  Psych: normal affect    Investigations:      Laboratory Testing:  Recent Results (from the past 24 hour(s))   CBC    Collection Time: 12/30/19  3:03 PM   Result Value Ref Range    WBC 7.6 3.5 - 11.3 k/uL    RBC 4.45 3.95 - 5.11 m/uL    Hemoglobin 13.5 11.9 - 15.1 g/dL    Hematocrit 40.6 36.3 - 47.1 %    MCV 91.2 82.6 - 102.9 fL    MCH 30.3 25.2 - 33.5 pg    MCHC 33.3 28.4 - 34.8 g/dL    RDW 13.0 11.8 - 14.4 %    Platelets 807 240 - 592 k/uL    MPV 11.6 8.1 - 13.5 fL    NRBC Automated 0.0 0.0 per 100 WBC   BASIC METABOLIC PANEL    Collection Time: 12/30/19  3:03 PM   Result Value Ref Range    Glucose 712 (HH) 70 - 99 mg/dL    BUN 25 (H) 6 - 20 mg/dL    CREATININE 1.04 (H) 0.50 - 0.90 mg/dL    Bun/Cre Ratio NOT REPORTED 9 - 20    Calcium 9.1 8.6 - 10.4 mg/dL    Sodium 130 (L) 135 - 144 mmol/L    Potassium 4.1 3.7 - 5.3 mmol/L    Chloride 92 (L) 98 - 107 mmol/L    CO2 26 20 - 31 mmol/L    Anion Gap 12 9 - 17 mmol/L    GFR Non-African American 57 (L) >60 mL/min    GFR African American >60 >60 mL/min    GFR Comment          GFR Staging NOT REPORTED    C-REACTIVE PROTEIN    Collection Time: 12/30/19  3:03 PM   Result Value Ref Range    CRP 89.2 (H) 0.0 - 5.0 mg/L   Sedimentation Rate    Collection Time: 12/30/19  3:03 PM   Result Value Ref Range    Sed Rate 116 (H) 0 - 20 mm   POC Glucose Fingerstick    Collection Time: 12/30/19  4:37 PM   Result Value Ref Range    POC Glucose >600 (HH) 65 - 105 mg/dL   Glucose, random    Collection Time: 12/30/19  5:35 PM   Result Value Ref Range    Glucose 619 (HH) 70 - 99 mg/dL   POC Glucose Fingerstick    Collection Time: 12/30/19  7:00 PM   Result Value Ref Range    POC Glucose 524 (HH) 65 - 105 mg/dL   POC Glucose Fingerstick    Collection Time: 12/30/19  8:19 PM   Result Value Ref Range    POC Glucose 387 (H) 65 - 105 mg/dL   POC Glucose Fingerstick    Collection Time: 12/30/19 10:09 PM   Result Value Ref Range    POC Glucose 239 (H) 65 - 105 mg/dL   POC Glucose Fingerstick    Collection and most importantly because of direct risk to patient if care not provided in a hospital setting.     KAYLEE Perera NP  12/31/2019  12:36 AM    Copy sent to KAYLEE Villalobos - CNP

## 2019-12-31 NOTE — PROGRESS NOTES
[facility]    Progress Note    12/31/2019    11:41 AM    Name:   Shantell Angel  MRN:     [id number]     Acct:      [de-identified]   Room:   2026/2026-01  IP Day:  1  Admit Date:  12/30/2019  2:09 PM    PCP:   KAYLEE Alvarado CNP  Code Status:  Full Code    Subjective:     C/C:   Chief Complaint   Patient presents with    Foot Pain     complaining of right foot pain to bottom of foot near crease of great toe and lateral right foot, she states that she started to notice swelling to dorsal aspect of foot with mild redness and pain    Leg Pain     complaining of right lower extremity pain to right medial aspect      Interval History Status: improved. Pain 10 out of 10 and states that her pain is not well controlled at all. Not well controlled A1c is 13. Brief History:     Shantell Angel is a 38 y.o. / female who presents with Foot Pain (complaining of right foot pain to bottom of foot near crease of great toe and lateral right foot, she states that she started to notice swelling to dorsal aspect of foot with mild redness and pain) and Leg Pain (complaining of right lower extremity pain to right medial aspect )   and is admitted to the hospital for the management of Foot infection.     Patient is a 38 yr old female who presents to ED with c/p Right foot pain. Right great toe noted to be deep red/pruple in color with swelling extending into dorsum of foot along with red/purple coloring. Pulses are palpable, brisk cap refill, and warmth noted. Ms. Phoebe Gutiérrez denies any injury to the affected toe/foot, denies any fevers, chills, N/V/D andstates the symptoms have been ongoing for approx 24 hrs. She has baseline neuropathy and denies any changes in N/T. Presenting labs with elevated inflammatory markers along with significant Hyperglycemia, no gas on radiological imaging and doppler negative for DVT.      PMH significant for DVT Date/Time    SPECIAL NOT REPORTED 02/23/2016 01:27 PM     Lab Results   Component Value Date/Time    CULTURE (A) 12/27/2013 10:37 PM     STREPTOCOCCI, BETA HEMOLYTIC GROUP B 10 to 50,000 CFU/ML    CULTURE  12/27/2013 10:37 PM     [facility] [address] [facility] [address]  [phone]       Radiology:  Xr Foot Right (min 3 Views)    Result Date: 12/30/2019  Soft tissue edema surrounding the 1st digit without evidence for radiopaque foreign body or subcutaneous air. Physical Examination:        General appearance:  alert, cooperative and no distress  Mental Status:  oriented to person, place and time and normal affect  Lungs:  clear to auscultation bilaterally, normal effort  Heart:  regular rate and rhythm, no murmur  Abdomen:  soft, nontender, nondistended, normal bowel sounds, no masses, hepatomegaly, splenomegaly  Extremities:  no edema, redness, tenderness in the calves  Skin: Swelling of the first digit right foot. Assessment:        Hospital Problems           Last Modified POA    * (Principal) Foot infection 12/31/2019 Yes    Hypertension 12/31/2019 Yes    Morbid obesity due to excess calories ([address] 12/31/2019 Yes    Essential hypertension 12/31/2019 Yes    Hypothyroidism 12/31/2019 Yes    Diabetic polyneuropathy ([address] 12/31/2019 Yes    Type 2 diabetes mellitus with neurologic complication, with long-term current use of insulin ([address] 12/31/2019 Yes    Chronic diastolic congestive heart failure ([address] 12/31/2019 Yes    Hyperglycemia 12/31/2019 Yes    Non compliance w medication regimen 12/31/2019 Yes    H/O deep venous thrombosis 12/31/2019 Yes          Plan:        1. Cellulitis foot infection. Continue antibiotics vancomycin and Zosyn. 2. Hypertension fairly well-controlled continue current management. 3. Diabetes mellitus not well controlled will increase Lantus to 40 units to new insulin sliding scale. 4. We will get TSH continue Synthroid.   5. Will get procalcitonin, parathyroid

## 2019-12-31 NOTE — ED NOTES
Report given to Jann MATOS on [facility], nurse denies any further questions.  Pt still waiting on clean bad      HWilfrid Young RN  12/31/19 0536

## 2019-12-31 NOTE — CONSULTS
[facility] - Initial Consult Note  Today's Date and Time: 12-31-19, 11:56 AM    Impression :   · Rt great toe diabetic infection  · S/P I&D per Podiatry 12-31  · DM II not controlled  · CAD s/p stenting x 2  · Non compliance    Recommendations:   · Stop Vancomycin  · Start Cefazolin 2gm IV q 8   · Start Diflucan 200 mg po daily    Medical Decision Making/Summary/Discussion:1/2/2020     ·   Infection Control Recommendations   · Penelope Precautions    Antimicrobial Stewardship Recommendations     · Targeted therapy  · IV to oral conversion  · PK dosing    Coordination of Outpatient Care:   · Estimated Length of IV antimicrobials: TBD  · Patient will need Midline Catheter Insertion:  No  · Patient will need PICC line Insertion: No  · Patient will need: Home IV , [place],  SNF,  LTAC: No  · Patient will need outpatient wound care: TBD    Chief complaint/reason for consultation:   · Diabetic foot infection, uncontrolled DM      History of Present Illness:   Jan Garcias is a 38y.o.-year-old  female who was initially admitted on 12/30/2019. Patient seen at the request of Dr. Reddy Cords:    Pt is a 37 yo woman visiting from [place]. She noted some Rt great toe pain on 12-24. By 12-30 the pain had progressed and her foot was swollen and tender so she presented to the ED for further evaluation. Pt also has a history of CAD with multiple stents in place, PE and IDDM. She reports that she lost her insurance 6 months ago and has not been taking her Brilinta, Eliquis or Insulin as a result. She denies any chills, fevers, night sweats or malaise prior to presenting to the ED. In the ED she was found to have abscess at the base of the Rt great toe with erythema and fluctuance noted. The wound did not penetrate to the bone, no induration or crepitus noted.       , .5    Pt was admitted, started on Zosyn and Vancomycin, and seen by unspecified type diabetes mellitus without mention of complication, not stated as uncontrolled     Urinary incontinence        Past Surgical  History:     Past Surgical History:   Procedure Laterality Date     SECTION      2 times    CHOLECYSTECTOMY      TOENAIL EXCISION         Medications:      sodium chloride flush  10 mL Intravenous BID    carvedilol  12.5 mg Oral BID WC    sodium chloride flush  10 mL Intravenous 2 times per day    sodium chloride flush  10 mL Intravenous 2 times per day    vancomycin  1,750 mg Intravenous Q24H    vancomycin (VANCOCIN) intermittent dosing (placeholder)   Other RX Placeholder    insulin glargine  40 Units Subcutaneous Nightly    sodium chloride flush  10 mL Intravenous BID    aspirin  81 mg Oral Daily    levothyroxine  50 mcg Oral Daily    lisinopril-hydrochlorothiazide  1 tablet Oral Daily    metFORMIN  1,000 mg Oral BID WC    simvastatin  20 mg Oral Nightly    apixaban  2.5 mg Oral BID    insulin lispro  0-18 Units Subcutaneous TID WC    insulin lispro  0-9 Units Subcutaneous Nightly       Social History:     Social History     Socioeconomic History    Marital status:      Spouse name: Not on file    Number of children: Not on file    Years of education: Not on file    Highest education level: Not on file   Occupational History    Not on file   Social Needs    Financial resource strain: Not on file    Food insecurity:     Worry: Not on file     Inability: Not on file    Transportation needs:     Medical: Not on file     Non-medical: Not on file   Tobacco Use    Smoking status: Former Smoker     Packs/day: 0.10   Substance and Sexual Activity    Alcohol use: No    Drug use: No    Sexual activity: Not on file   Lifestyle    Physical activity:     Days per week: Not on file     Minutes per session: Not on file    Stress: Not on file   Relationships    Social connections:     Talks on phone: Not on file     Gets together: Not on ANTERIOR TENDONS: The tibialis anterior, extensor hallucis longus and   extensor digitorum longus tendons are normal in position, morphology and   signal.       TARSAL TUNNEL: There are no obstructing lesions within the tarsal tunnel.       MISCELLANEOUS: Circumferential subcutaneous edema about the ankle and along   the dorsum of the midfoot.  No soft tissue ulceration, sinus tract, or   drainable fluid collection.  No abnormal soft tissue mass.           Impression   1. No osteomyelitis or other acute osseous abnormality of the hindfoot.    2. Circumferential subcutaneous edema about the ankle and along the dorsum of   the midfoot compatible with bland edema versus cellulitis.  No deep soft   tissue ulceration or drainable fluid collection.         EXAMINATION:   MRI OF THE RIGHT FOREFOOT WITHOUT AND WITH CONTRAST, 1/2/2020 10:00 am       TECHNIQUE:   Multiplanar multisequence MRI of the right forefoot was performed without and   with the administration of intravenous contrast.       COMPARISON:   12/31/2019 hindfoot MRI       HISTORY:   ORDERING SYSTEM PROVIDED HISTORY: Wound   TECHNOLOGIST PROVIDED HISTORY:   Wound   What is the area of interest?->Forefoot   Is the patient pregnant?->No       Wound at the plantar aspect of the great toe.       FINDINGS:   There is mild subcutaneous edema in the dorsal and plantar forefoot.  Focal   confluent soft tissue edema measuring approximately 1.9 x 0.8 x 2.1 cm is   seen in the soft tissues at the plantar aspect of the proximal great toe,   superficial to the flexor tendon, adjacent to the proximal phalanx.  There is   appears to be intervening fat on the T1 weighted sequences.  No definite   organized fluid collection identified in the soft tissues.  No abnormal   enhancement.  There are a couple low signal foci within the plantar soft   tissues of the great toe, somewhat linear appearance, suggesting a vascular   structure, but possibility of soft tissue gas is not

## 2019-12-31 NOTE — CONSULTS
Consultation Note  Podiatric Medicine and Surgery     Subjective     Chief Complaint: Right foot pain     HPI:  Rigo Steele is a 38 y.o. female with a past medical history of DM2 with neuropathy [facility]. Pt states that about 1 week ago her right great toe became very painful and this morning she noted worsening pain and new redness of her right great toe. Pt denies any trauma or stepping on anything sharp. She notes her redness progressively has moved up towards her ankle. Patient is originally from [place] and has moved here, she currently does not have her insulin. Pt is also complaining of calf pain. She has a history of a DVT and PE. Pt denies any N/V/F/C/CP/SOB. PCP is KAYLEE Mas CNP    ROS:   Review of Systems   Constitutional: Negative for chills and fever. Respiratory: Negative for shortness of breath. Cardiovascular: Positive for leg swelling. Negative for chest pain. Gastrointestinal: Negative for abdominal pain, constipation, diarrhea, nausea and vomiting. Musculoskeletal: Positive for arthralgias and myalgias. Negative for gait problem and joint swelling. Skin: Positive for color change and wound. Neurological: Positive for numbness. Negative for weakness. Past Medical History   has a past medical history of Hyperlipidemia, Hypertension, Hypothyroidism, Type II or unspecified type diabetes mellitus without mention of complication, not stated as uncontrolled, and Urinary incontinence. Past Surgical History   has a past surgical history that includes  section; Cholecystectomy; and toenail excision. Medications  Prior to Admission medications    Medication Sig Start Date End Date Taking?  Authorizing Provider   glipiZIDE (GLUCOTROL) 10 MG tablet Take 2 tablets by mouth 2 times daily (before meals) 16   Minus MD Lorrie   levothyroxine (SYNTHROID) 50 MCG tablet Take 1 tablet by mouth daily 16 Artemio Rose MD   carvedilol (COREG) 12.5 MG tablet Take 1 tablet by mouth 2 times daily (with meals) Not sure if using 7/25/16   Artemio Rose MD   lisinopril-hydrochlorothiazide (PRINZIDE;ZESTORETIC) 20-25 MG per tablet Take 1 tablet by mouth daily 40/25 7/25/16   Artemio Rose MD   metFORMIN (GLUCOPHAGE) 1000 MG tablet Take 1 tablet by mouth 2 times daily (with meals) 7/25/16   Artemio Rose MD   glucose monitoring kit (FREESTYLE) monitoring kit 1 kit by Does not apply route daily as needed (monitor blood glucose levels) 7/25/16   Lana Parker MD   Glucose Blood (BLOOD GLUCOSE TEST STRIPS) STRP Check blood sugars three times daily 7/25/16   Lana Parker MD   benzocaine-menthol (CEPACOL SORE THROAT EX ST) 15-3.6 MG lozenge Take 1 lozenge by mouth 3 times daily as needed for Sore Throat 2/23/16   Marina Gamez DO   ibuprofen (ADVIL;MOTRIN) 800 MG tablet Take 1 tablet by mouth every 8 hours as needed for Pain 1/16/16   Larissa Briones,    aspirin 81 MG chewable tablet Take 1 tablet by mouth daily. 3/20/15   Connro Donaldson MD   gabapentin (NEURONTIN) 300 MG capsule Take 1 capsule by mouth 3 times daily. 3/20/15   Connor Donaldson MD   simvastatin (ZOCOR) 20 MG tablet Take 1 tablet by mouth nightly. 3/20/15   Connor Donaldson MD   nystatin (MYCOSTATIN) 511646 UNIT/GM powder Apply topically 4 times daily.  3/20/15   Connor Donaldson MD   Blood Glucose Monitoring Suppl (BLOOD GLUCOSE METER) KIT Test 3 times daily Dx: 250.00  Diabetes Mellitus Non-Insulin Dependent 3/20/15   Connor Donaldson MD   Blood Pressure KIT Use as directed 3/20/15   Connor Donaldson MD    Scheduled Meds:   carvedilol  12.5 mg Oral BID WC    sodium chloride flush  10 mL Intravenous 2 times per day    sodium chloride flush  10 mL Intravenous 2 times per day    vancomycin  1,750 mg Intravenous Q24H    vancomycin (VANCOCIN) intermittent dosing (placeholder)   Other RX Placeholder    insulin glargine  40 [Urine:700]    CBC:  Recent Labs     12/30/19  1503 12/31/19  1843   WBC 7.6  --    HGB 13.5  --    HCT 40.6  --      --    CRP 89.2* 151.5*        BMP:  Recent Labs     12/30/19  1503 12/30/19  1735   *  --    K 4.1  --    CL 92*  --    CO2 26  --    BUN 25*  --    CREATININE 1.04*  --    GLUCOSE 712* 619*   CALCIUM 9.1  --         Coags:  No results for input(s): APTT, PROT, INR in the last 72 hours. Lab Results   Component Value Date    LABA1C 16.0 (H) 12/30/2019     Lab Results   Component Value Date    SEDRATE 116 (H) 12/30/2019     Lab Results   Component Value Date    .5 (H) 12/31/2019         Lower Extremity Physical Exam:  Vascular: DP and PT pulses are palpable. CFT <3 seconds to all digits. Hair growth is absent to the level of the digits. +1 pitting edema to the right foot    Neuro: Saph/sural/SP/DP/plantar sensation diminished to light touch. Musculoskeletal: Muscle strength is 5/5 to all lower extremity muscle groups on Left, deferred on the right. Gross deformity is absent. Tenderness to touch the plantar and dorsal aspect of the right hallux and posterior calf. Painful ROM of right ankle    Dermatologic: Full thickness pinpoint ulcer #1 located plantar sulcus of right hallux and measures approximately 0.1 cm x 0.1 cm x 0.5 cm. Base is granular. Periwound skin is hyperkeratotic. Serosanguinous drainage noted with no associated mal odor. Erythema present with streaking up the dorsal aspect of the right foot with associated increase in warmth. Fluctuance noted plantar lateral aspect of right hallux sulcus. Does not probe to bone, sinus track, or undermine. No fluctuance, crepitus, or induration. Interdigital maceration absent. Left foot is warm and dry with no open wounds    Clinical Images:              Imaging:   MRI FOOT RIGHT W WO CONTRAST   Final Result   1. No osteomyelitis or other acute osseous abnormality of the hindfoot.    2. Circumferential subcutaneous

## 2019-12-31 NOTE — CARE COORDINATION
Case Management Initial Discharge Plan  [place],             Met with:patient to discuss discharge plans. Information verified: address, contacts, phone number, , insurance Yes  PCP: KAYLEE Stratton CNP  Date of last visit: Few months ago    Insurance Provider: [facility]     Discharge Planning    Living Arrangements:  Children   Support Systems:  Family Members, [id number] has 1 stories  3 stairs to climb to get into front door, 0stairs to climb to reach second floor  Location of bedroom/bathroom in home 0    Patient able to perform ADL's:Independent    Current Services (outpatient & in home) none   DME equipment: na  DME provider: aydee      Potential Assistance Needed:  N/A    Patient agreeable to home care: No  [place] of choice provided:  n/a    Prior SNF/Rehab Placement and Facility: none   Agreeable to SNF/Rehab: No  [place] of choice provided: n/a   Evaluation: n/a    Expected Discharge date:     Patient expects to be discharged to:  home  Follow Up Appointment: Best Day/ Time:      Transportation provider: Self or family   Transportation arrangements needed for discharge: No    Readmission Risk              Risk of Unplanned Readmission:        0             Does patient have a readmission risk score greater than 14?: DOMINIQUE  If yes, follow-up appointment must be made within 7 days of discharge.      Goals of Care: Get better       Discharge Plan: Home Independently           Electronically signed by Benjie Castorena RN on 19 at 8:01 PM

## 2019-12-31 NOTE — ED NOTES
Pt resting on stretcher. NAD noted. RR even and nonlabored. Call light within reach. Will continue to monitor.        Letha Lemos RN  12/30/19 1624

## 2019-12-31 NOTE — PROGRESS NOTES
Pharmacy Note  Vancomycin Consult    Angelica Haider is a 38 y.o. female started on Vancomycin for cellulitis; consult received from  :Mariella Burciaga to manage therapy. Also receiving the following antibiotics: none. Patient Active Problem List   Diagnosis    Hypertension    Morbid obesity due to excess calories ([facility] [address]    Essential hypertension    Hypothyroidism    Diabetic polyneuropathy ([facility] [address]    Type 2 diabetes mellitus with neurologic complication, with long-term current use of insulin (HCC)    Accelerated hypertension    Keratitis, herpetic    Chronic diastolic congestive heart failure (HCC)    Acute right eye pain    Foot infection       Allergies:  Patient has no known allergies. Temp max: 98.2    Recent Labs     12/30/19  1503   BUN 25*       Recent Labs     12/30/19  1503   CREATININE 1.04*       Recent Labs     12/30/19  1503   WBC 7.6       No intake or output data in the 24 hours ending 12/31/19 0006    Culture Date      Source                       Results  pending    Ht Readings from Last 1 Encounters:   12/30/19 5' 1\" (1.549 m)        Wt Readings from Last 1 Encounters:   12/30/19 240 lb (108.9 kg)         Body mass index is 45.35 kg/m². Estimated Creatinine Clearance: 78 mL/min (A) (based on SCr of 1.04 mg/dL (H)). Goal Trough Level: 10-15 mcg/mL    Assessment/Plan:  Will initiate vancomycin 1750 mg IV every 24 hours. Timing of trough level will be determined based on culture results, renal function, and clinical response. Thank you for the consult. Will continue to follow.     Raquel Hawthorne [facility]  12/31/2019 12:09 AM

## 2019-12-31 NOTE — ED PROVIDER NOTES
[facility] ED  Emergency Department  Emergency Medicine Resident Sign-out     Care of Rola Aguilar was assumed from Dr. Sonali Lennon and is being seen for Foot Pain (complaining of right foot pain to bottom of foot near crease of great toe and lateral right foot, she states that she started to notice swelling to dorsal aspect of foot with mild redness and pain) and Leg Pain (complaining of right lower extremity pain to right medial aspect )  . The patient's initial evaluation and plan have been discussed with the prior provider who initially evaluated the patient.      EMERGENCY DEPARTMENT COURSE / MEDICAL DECISION MAKING:       MEDICATIONS GIVEN:  Orders Placed This Encounter   Medications    0.9 % sodium chloride bolus    insulin lispro (HUMALOG) injection vial 10 Units    acetaminophen (TYLENOL) tablet 650 mg    vancomycin (VANCOCIN) 1750 mg in dextrose 5% 500 mL IVPB    aspirin chewable tablet 81 mg    levothyroxine (SYNTHROID) tablet 50 mcg    lisinopril-hydrochlorothiazide (PRINZIDE;ZESTORETIC) 20-25 MG per tablet 1 tablet    metFORMIN (GLUCOPHAGE) tablet 1,000 mg    simvastatin (ZOCOR) tablet 20 mg    DISCONTD: vancomycin (VANCOCIN) 1750 mg in dextrose 5% 500 mL IVPB    DISCONTD: acetaminophen (TYLENOL) tablet 650 mg    ibuprofen (ADVIL;MOTRIN) tablet 800 mg    DISCONTD: insulin lispro (HUMALOG) injection vial 0-6 Units    DISCONTD: insulin lispro (HUMALOG) injection vial 0-3 Units    0.9 % sodium chloride infusion    apixaban (ELIQUIS) tablet 2.5 mg    carvedilol (COREG) tablet 12.5 mg    DISCONTD: lisinopril-hydrochlorothiazide (PRINZIDE;ZESTORETIC) 20-25 MG per tablet 1 tablet    0.9 % sodium chloride bolus    insulin lispro (HUMALOG) injection vial 7 Units    insulin lispro (HUMALOG) injection vial 10 Units    insulin lispro (HUMALOG) injection vial 10 Units    0.9 % sodium chloride bolus    carvedilol (COREG) tablet 12.5 mg    DISCONTD: vancomycin (VANCOCIN) 1000 mg Velocities are measured in cm/s ; Diameters are measured in cm Right Lower Extremities DVT Study Measurements Right 2D Measurements +------------------------------------+----------+---------------+----------+ ! Location                            ! Visualized! Compressibility! Thrombosis! +------------------------------------+----------+---------------+----------+ ! Common Femoral                      !Yes       ! Yes            ! None      ! +------------------------------------+----------+---------------+----------+ ! Prox Femoral                        !Yes       ! Yes            ! None      ! +------------------------------------+----------+---------------+----------+ ! Mid Femoral                         !Yes       ! Yes            ! None      ! +------------------------------------+----------+---------------+----------+ ! Dist Femoral                        !Yes       ! Yes            ! None      ! +------------------------------------+----------+---------------+----------+ ! Deep Femoral                        !No        !               !          ! +------------------------------------+----------+---------------+----------+ ! Popliteal                           !Partial   !Yes            ! None      ! +------------------------------------+----------+---------------+----------+ ! Sapheno Femoral Junction            ! Yes       ! Yes            ! None      ! +------------------------------------+----------+---------------+----------+ ! PTV                                 ! Yes       ! Yes            ! None      ! +------------------------------------+----------+---------------+----------+ ! Peroneal                            !Partial   !Yes            ! None      ! +------------------------------------+----------+---------------+----------+ ! Gastroc                             ! Yes       ! Yes            ! None      ! +------------------------------------+----------+---------------+----------+ ! GSV Thigh                           ! Yes !Yes            !None      ! +------------------------------------+----------+---------------+----------+ ! GSV Knee                            ! Yes       ! Yes            ! None      ! +------------------------------------+----------+---------------+----------+ ! GSV Ankle                           ! Yes       ! Yes            ! None      ! +------------------------------------+----------+---------------+----------+ ! SSV                                 ! Yes       ! Yes            ! None      ! +------------------------------------+----------+---------------+----------+ Right Doppler Measurements +---------------------------+------+------+--------------------------------+ ! Location                   ! Signal!Reflux! Reflux (msec)                   ! +---------------------------+------+------+--------------------------------+ ! Common Femoral             !Phasic!      !                                ! +---------------------------+------+------+--------------------------------+ ! Prox Femoral               !Phasic!      !                                ! +---------------------------+------+------+--------------------------------+ ! Popliteal                  !Phasic!      !                                ! +---------------------------+------+------+--------------------------------+      RECENT VITALS:     Temp: 98.2 °F (36.8 °C),  Pulse: 92, Resp: 20, BP: (!) 156/94, SpO2: 94 %    This patient is a 38 y.o. Female with sudden with swelling of right lower extremity, off her anticoagulants for some time due to insurance issues, also off her insulin. Found to be hyperglycemic with glucose in 700s, given 30 units insulin, glucose now in the 100s. Labs significant for mildly elevated CRP and ESR. Venous Dopplers negative for clot, x-ray showed no signs of potential osteomyelitis. Patient started on vancomycin and Zosyn for suspected cellulitis with possible abscess. Patient admitted to [facility].       ED Course as of Dec 31 0200 Mon Dec 30, 2019   1533 Glucose(!!): 712 [TEGAN]   2001 8:01 PM  Was informed by coordinator that [facility] has refused. Jv Guerrero, care coordinator has refused observation placement of the patient. Will call intermed    [TEGAN]      ED Course User Index  [TEGAN] Gail Overton PA-C         OUTSTANDING TASKS / RECOMMENDATIONS:    1. bed     FINAL IMPRESSION:     1. Cellulitis of foot    2.  Hyperglycemia        DISPOSITION:         DISPOSITION:  []  Discharge   []  Transfer -    [x]  Admission -  interemd   []  Against Medical Advice   []  Eloped   FOLLOW-UP: Vicky Claros, APRN - CNP  [address] [facility]  [phone]           DISCHARGE MEDICATIONS: New Prescriptions    No medications on file          [place]ol [place]  Emergency Medicine Resident  [address] [facility]        [place]ol [place]  Resident  12/31/19 8182

## 2020-01-01 LAB
ANION GAP SERPL CALCULATED.3IONS-SCNC: 11 MMOL/L (ref 9–17)
BUN BLDV-MCNC: 14 MG/DL (ref 6–20)
BUN/CREAT BLD: ABNORMAL (ref 9–20)
CALCIUM SERPL-MCNC: 7.9 MG/DL (ref 8.6–10.4)
CHLORIDE BLD-SCNC: 98 MMOL/L (ref 98–107)
CO2: 23 MMOL/L (ref 20–31)
CREAT SERPL-MCNC: 1.07 MG/DL (ref 0.5–0.9)
GFR AFRICAN AMERICAN: >60 ML/MIN
GFR NON-AFRICAN AMERICAN: 55 ML/MIN
GFR SERPL CREATININE-BSD FRML MDRD: ABNORMAL ML/MIN/{1.73_M2}
GFR SERPL CREATININE-BSD FRML MDRD: ABNORMAL ML/MIN/{1.73_M2}
GLUCOSE BLD-MCNC: 287 MG/DL (ref 65–105)
GLUCOSE BLD-MCNC: 289 MG/DL (ref 65–105)
GLUCOSE BLD-MCNC: 290 MG/DL (ref 70–99)
GLUCOSE BLD-MCNC: 295 MG/DL (ref 65–105)
GLUCOSE BLD-MCNC: 334 MG/DL (ref 65–105)
HCT VFR BLD CALC: 34.5 % (ref 36.3–47.1)
HEMOGLOBIN: 11 G/DL (ref 11.9–15.1)
MCH RBC QN AUTO: 29.8 PG (ref 25.2–33.5)
MCHC RBC AUTO-ENTMCNC: 31.9 G/DL (ref 28.4–34.8)
MCV RBC AUTO: 93.5 FL (ref 82.6–102.9)
NRBC AUTOMATED: 0 PER 100 WBC
PDW BLD-RTO: 13.3 % (ref 11.8–14.4)
PLATELET # BLD: 235 K/UL (ref 138–453)
PMV BLD AUTO: 11.4 FL (ref 8.1–13.5)
POTASSIUM SERPL-SCNC: 3.8 MMOL/L (ref 3.7–5.3)
RBC # BLD: 3.69 M/UL (ref 3.95–5.11)
SODIUM BLD-SCNC: 132 MMOL/L (ref 135–144)
WBC # BLD: 8.9 K/UL (ref 3.5–11.3)

## 2020-01-01 PROCEDURE — 6370000000 HC RX 637 (ALT 250 FOR IP): Performed by: FAMILY MEDICINE

## 2020-01-01 PROCEDURE — 6370000000 HC RX 637 (ALT 250 FOR IP): Performed by: PHYSICIAN ASSISTANT

## 2020-01-01 PROCEDURE — 99232 SBSQ HOSP IP/OBS MODERATE 35: CPT | Performed by: FAMILY MEDICINE

## 2020-01-01 PROCEDURE — 36415 COLL VENOUS BLD VENIPUNCTURE: CPT

## 2020-01-01 PROCEDURE — 6370000000 HC RX 637 (ALT 250 FOR IP): Performed by: NURSE PRACTITIONER

## 2020-01-01 PROCEDURE — 6360000002 HC RX W HCPCS: Performed by: EMERGENCY MEDICINE

## 2020-01-01 PROCEDURE — 80048 BASIC METABOLIC PNL TOTAL CA: CPT

## 2020-01-01 PROCEDURE — 82947 ASSAY GLUCOSE BLOOD QUANT: CPT

## 2020-01-01 PROCEDURE — 6360000002 HC RX W HCPCS: Performed by: FAMILY MEDICINE

## 2020-01-01 PROCEDURE — 2580000003 HC RX 258: Performed by: EMERGENCY MEDICINE

## 2020-01-01 PROCEDURE — 1200000000 HC SEMI PRIVATE

## 2020-01-01 PROCEDURE — 2580000003 HC RX 258: Performed by: NURSE PRACTITIONER

## 2020-01-01 PROCEDURE — 85027 COMPLETE CBC AUTOMATED: CPT

## 2020-01-01 RX ORDER — DIPHENHYDRAMINE HCL 25 MG
25 TABLET ORAL EVERY 6 HOURS PRN
Status: DISCONTINUED | OUTPATIENT
Start: 2020-01-01 | End: 2020-01-12 | Stop reason: HOSPADM

## 2020-01-01 RX ADMIN — INSULIN LISPRO 9 UNITS: 100 INJECTION, SOLUTION INTRAVENOUS; SUBCUTANEOUS at 08:44

## 2020-01-01 RX ADMIN — APIXABAN 2.5 MG: 2.5 TABLET, FILM COATED ORAL at 08:43

## 2020-01-01 RX ADMIN — APIXABAN 2.5 MG: 2.5 TABLET, FILM COATED ORAL at 20:22

## 2020-01-01 RX ADMIN — HYDROMORPHONE HYDROCHLORIDE 0.5 MG: 1 INJECTION, SOLUTION INTRAMUSCULAR; INTRAVENOUS; SUBCUTANEOUS at 05:56

## 2020-01-01 RX ADMIN — DIPHENHYDRAMINE HCL 25 MG: 25 TABLET ORAL at 12:44

## 2020-01-01 RX ADMIN — SIMVASTATIN 20 MG: 20 TABLET, FILM COATED ORAL at 20:22

## 2020-01-01 RX ADMIN — INSULIN LISPRO 9 UNITS: 100 INJECTION, SOLUTION INTRAVENOUS; SUBCUTANEOUS at 17:54

## 2020-01-01 RX ADMIN — METFORMIN HYDROCHLORIDE 1000 MG: 500 TABLET ORAL at 08:42

## 2020-01-01 RX ADMIN — ASPIRIN 81 MG 81 MG: 81 TABLET ORAL at 08:43

## 2020-01-01 RX ADMIN — INSULIN LISPRO 6 UNITS: 100 INJECTION, SOLUTION INTRAVENOUS; SUBCUTANEOUS at 21:19

## 2020-01-01 RX ADMIN — VANCOMYCIN HYDROCHLORIDE 1750 MG: 10 INJECTION, POWDER, LYOPHILIZED, FOR SOLUTION INTRAVENOUS at 17:56

## 2020-01-01 RX ADMIN — CARVEDILOL 12.5 MG: 12.5 TABLET, FILM COATED ORAL at 17:56

## 2020-01-01 RX ADMIN — SODIUM CHLORIDE, PRESERVATIVE FREE 10 ML: 5 INJECTION INTRAVENOUS at 21:20

## 2020-01-01 RX ADMIN — LEVOTHYROXINE SODIUM 50 MCG: 100 TABLET ORAL at 08:43

## 2020-01-01 RX ADMIN — METFORMIN HYDROCHLORIDE 1000 MG: 500 TABLET ORAL at 20:22

## 2020-01-01 RX ADMIN — LISINOPRIL AND HYDROCHLOROTHIAZIDE 1 TABLET: 25; 20 TABLET ORAL at 08:43

## 2020-01-01 RX ADMIN — INSULIN LISPRO 9 UNITS: 100 INJECTION, SOLUTION INTRAVENOUS; SUBCUTANEOUS at 12:44

## 2020-01-01 RX ADMIN — HYDROMORPHONE HYDROCHLORIDE 0.5 MG: 1 INJECTION, SOLUTION INTRAMUSCULAR; INTRAVENOUS; SUBCUTANEOUS at 01:40

## 2020-01-01 RX ADMIN — CARVEDILOL 12.5 MG: 12.5 TABLET, FILM COATED ORAL at 08:43

## 2020-01-01 RX ADMIN — INSULIN GLARGINE 40 UNITS: 100 INJECTION, SOLUTION SUBCUTANEOUS at 21:19

## 2020-01-01 RX ADMIN — HYDROMORPHONE HYDROCHLORIDE 0.5 MG: 1 INJECTION, SOLUTION INTRAMUSCULAR; INTRAVENOUS; SUBCUTANEOUS at 21:19

## 2020-01-01 RX ADMIN — HYDROMORPHONE HYDROCHLORIDE 0.5 MG: 1 INJECTION, SOLUTION INTRAMUSCULAR; INTRAVENOUS; SUBCUTANEOUS at 13:20

## 2020-01-01 RX ADMIN — SODIUM CHLORIDE: 9 INJECTION, SOLUTION INTRAVENOUS at 09:30

## 2020-01-01 RX ADMIN — HYDROMORPHONE HYDROCHLORIDE 0.5 MG: 1 INJECTION, SOLUTION INTRAMUSCULAR; INTRAVENOUS; SUBCUTANEOUS at 17:54

## 2020-01-01 ASSESSMENT — PAIN SCALES - GENERAL
PAINLEVEL_OUTOF10: 9
PAINLEVEL_OUTOF10: 8
PAINLEVEL_OUTOF10: 9

## 2020-01-01 NOTE — PLAN OF CARE
Problem: Falls - Risk of:  Goal: Will remain free from falls  Description  Will remain free from falls  Outcome: Ongoing  Goal: Absence of physical injury  Description  Absence of physical injury  Outcome: Ongoing     Problem: Skin Integrity:  Goal: Demonstration of wound healing without infection will improve  Description  Demonstration of wound healing without infection will improve  Outcome: Ongoing  Goal: Complications related to intravenous access or infusion will be avoided or minimized  Description  Complications related to intravenous access or infusion will be avoided or minimized  Outcome: Ongoing  Goal: Will show no infection signs and symptoms  Description  Will show no infection signs and symptoms  Outcome: Ongoing  Goal: Absence of new skin breakdown  Description  Absence of new skin breakdown  Outcome: Ongoing     Problem: Pain:  Goal: Pain level will decrease  Description  Pain level will decrease  Outcome: Ongoing  Goal: Control of acute pain  Description  Control of acute pain  Outcome: Ongoing  Goal: Control of chronic pain  Description  Control of chronic pain  Outcome: Ongoing

## 2020-01-01 NOTE — PROGRESS NOTES
[facility] 18    Progress Note    1/1/2020    1:12 PM    Name:   Sammy Parra  MRN:     [id number]     Acct:      [de-identified]   Room:   2026/2026-01  IP Day:  2  Admit Date:  12/30/2019  2:09 PM    PCP:   KAYLEE Akins CNP  Code Status:  Full Code    Subjective:     C/C:   Chief Complaint   Patient presents with    Foot Pain     complaining of right foot pain to bottom of foot near crease of great toe and lateral right foot, she states that she started to notice swelling to dorsal aspect of foot with mild redness and pain    Leg Pain     complaining of right lower extremity pain to right medial aspect      Interval History Status: improved. Patient is from [place] and she was visiting family and ended up with cellulitis. Brief History:     Sammy Parra is a 38 y.o. / female who presents with Foot Pain (complaining of right foot pain to bottom of foot near crease of great toe and lateral right foot, she states that she started to notice swelling to dorsal aspect of foot with mild redness and pain) and Leg Pain (complaining of right lower extremity pain to right medial aspect )   and is admitted to the hospital for the management of Foot infection.     Patient is a 38 yr old female who presents to ED with c/p Right foot pain. Right great toe noted to be deep red/pruple in color with swelling extending into dorsum of foot along with red/purple coloring. Pulses are palpable, brisk cap refill, and warmth noted. Ms. Sagar Purcell denies any injury to the affected toe/foot, denies any fevers, chills, N/V/D andstates the symptoms have been ongoing for approx 24 hrs. She has baseline neuropathy and denies any changes in N/T. Presenting labs with elevated inflammatory markers along with significant Hyperglycemia, no gas on radiological imaging and doppler negative for DVT.      PMH significant for DVT (non-compliant with Xarelto secondary to expiring insurance), DMII controlled with oral agents (off all oral agents secondary to insurance issues), HTN, TIA, hypothyroidism, PANCHO (non-compliant with CPAP). Last ECHO done in 2015: EF:55%, LVH with Diastolic Dysfunction     On PE, patient is resting in bed, awake alert and oriented. C/o pain 4/10 ro RLE, Discoloration and swelling noted to left great toe and into foot with adequate pulses/flow. Denies any additional symptoms, VSS and no clinical signs of distress. Review of Systems:     Constitutional:  negative for chills, fevers, sweats  Respiratory:  negative for cough, dyspnea on exertion, hemoptysis, shortness of breath, wheezing  Cardiovascular:  negative for chest pain, chest pressure/discomfort, lower extremity edema, palpitations  Gastrointestinal:  negative for abdominal pain, constipation, diarrhea, nausea, vomiting  Neurological:  negative for dizziness, headache    Medications:      Allergies:  No Known Allergies    Current Meds:   Scheduled Meds:    carvedilol  12.5 mg Oral BID WC    sodium chloride flush  10 mL Intravenous 2 times per day    sodium chloride flush  10 mL Intravenous 2 times per day    vancomycin  1,750 mg Intravenous Q24H    vancomycin (VANCOCIN) intermittent dosing (placeholder)   Other RX Placeholder    insulin glargine  40 Units Subcutaneous Nightly    sodium chloride flush  10 mL Intravenous BID    aspirin  81 mg Oral Daily    levothyroxine  50 mcg Oral Daily    lisinopril-hydrochlorothiazide  1 tablet Oral Daily    metFORMIN  1,000 mg Oral BID WC    simvastatin  20 mg Oral Nightly    apixaban  2.5 mg Oral BID    insulin lispro  0-18 Units Subcutaneous TID     insulin lispro  0-9 Units Subcutaneous Nightly     Continuous Infusions:    sodium chloride 75 mL/hr at 12/31/19 1046    sodium chloride 100 mL/hr at 12/31/19 0127    dextrose       PRN Meds: diphenhydrAMINE, sodium chloride flush, sodium chloride flush, potassium chloride **OR** potassium alternative oral replacement **OR** potassium chloride, magnesium sulfate, magnesium hydroxide, nicotine, HYDROmorphone, ibuprofen, ondansetron, acetaminophen, glucose, dextrose, glucagon (rDNA), dextrose    Data:     Past Medical History:   has a past medical history of Hyperlipidemia, Hypertension, Hypothyroidism, Type II or unspecified type diabetes mellitus without mention of complication, not stated as uncontrolled, and Urinary incontinence. Social History:   reports that she has quit smoking. She smoked 0.10 packs per day. She does not have any smokeless tobacco history on file. She reports that she does not drink alcohol or use drugs. Family History:   Family History   Problem Relation Age of Onset    Stroke Father     Heart Disease Other        Vitals:  /79   Pulse 85   Temp 98.8 °F (37.1 °C) (Oral)   Resp 17   Ht 5' 1\" (1.549 m)   Wt 270 lb 14.6 oz (122.9 kg)   SpO2 98%   BMI 51.19 kg/m²   Temp (24hrs), Av.2 °F (37.3 °C), Min:98.8 °F (37.1 °C), Max:99.5 °F (37.5 °C)    Recent Labs     19  1815 19  2019 20  0730 20  1140   POCGLU 301* 343* 287* 295*       I/O (24Hr):     Intake/Output Summary (Last 24 hours) at 2020 1312  Last data filed at 2020 0657  Gross per 24 hour   Intake 2013.75 ml   Output 1900 ml   Net 113.75 ml       Labs:  Hematology:  Recent Labs     19  1503 19  1843 20  0540   WBC 7.6  --  8.9   RBC 4.45  --  3.69*   HGB 13.5  --  11.0*   HCT 40.6  --  34.5*   MCV 91.2  --  93.5   MCH 30.3  --  29.8   MCHC 33.3  --  31.9   RDW 13.0  --  13.3     --  235   MPV 11.6  --  11.4   SEDRATE 116*  --   --    CRP 89.2* 151.5*  --      Chemistry:  Recent Labs     19  1503 19  1735 20  0540   *  --  132*   K 4.1  --  3.8   CL 92*  --  98   CO2 26  --  23   GLUCOSE 712* 619* 290*   BUN 25*  --  14   CREATININE 1.04*  --  1.07*   ANIONGAP 12  --  11   LABGLOM 57*  --  55*   GFRAA >60  --  >60 CALCIUM 9.1  --  7.9*     Recent Labs     12/30/19  1503  12/31/19  0944 12/31/19  1237 12/31/19  1815 12/31/19  1843 12/31/19  2019 01/01/20  0730 01/01/20  1140   LABA1C 16.0*  --   --   --   --   --   --   --   --    TSH  --   --   --   --   --  48.78*  --   --   --    URICACID  --   --   --   --   --  4.0  --   --   --    POCGLU  --    < > 252* 404* 301*  --  343* 287* 295*    < > = values in this interval not displayed. ABG:  Lab Results   Component Value Date    FIO2 INFORMATION NOT PROVIDED 03/21/2015     Lab Results   Component Value Date/Time    SPECIAL NOT REPORTED 12/31/2019 12:22 PM    SPECIAL NOT REPORTED 12/31/2019 12:22 PM     Lab Results   Component Value Date/Time    CULTURE DUPLICATE ORDER 02/18/3062 12:22 PM       Radiology:  [place] Foot Right (min 3 Views)    Result Date: 12/30/2019  Soft tissue edema surrounding the 1st digit without evidence for radiopaque foreign body or subcutaneous air. Physical Examination:        General appearance:  alert, cooperative and no distress  Mental Status:  oriented to person, place and time and normal affect  Lungs:  clear to auscultation bilaterally, normal effort  Heart:  regular rate and rhythm, no murmur  Abdomen:  soft, nontender, nondistended, normal bowel sounds, no masses, hepatomegaly, splenomegaly  Extremities:  no edema, redness, tenderness in the calves  Skin: Swelling of the first digit right foot.     Assessment:        Hospital Problems           Last Modified POA    * (Principal) Foot infection 12/31/2019 Yes    Hypertension 12/31/2019 Yes    Morbid obesity due to excess calories ([address] 12/31/2019 Yes    Essential hypertension 12/31/2019 Yes    Hypothyroidism 12/31/2019 Yes    Diabetic polyneuropathy ([address] 12/31/2019 Yes    Type 2 diabetes mellitus with neurologic complication, with long-term current use of insulin ([address] 12/31/2019 Yes    Chronic diastolic congestive heart failure ([address] 12/31/2019 Yes    Hyperglycemia 12/31/2019 Yes    Non compliance w medication regimen 12/31/2019 Yes    H/O deep venous thrombosis 12/31/2019 Yes    Cellulitis of foot 1/1/2020 Yes          Plan:        1. Cellulitis foot infection. Continue antibiotics vancomycin and Zosyn. 2. Hypertension fairly well-controlled continue current management. 3. Diabetes mellitus not well controlled will increase Lantus to 40 units to new insulin sliding scale. 4. We will get TSH continue Synthroid. 5. Will get procalcitonin, parathyroid hormone, uric acid  6. Get MRI and if it is positive for osteomyelitis will consult infectious disease.   7. DVT GI prophylaxis    Ekaterina Alexandra MD  1/1/2020  1:12 PM

## 2020-01-01 NOTE — PROGRESS NOTES
Infusions:   sodium chloride 75 mL/hr at 19 1046    sodium chloride 100 mL/hr at 19 0127    dextrose         PRN Meds:sodium chloride flush, sodium chloride flush, potassium chloride **OR** potassium alternative oral replacement **OR** potassium chloride, magnesium sulfate, magnesium hydroxide, nicotine, HYDROmorphone, ibuprofen, ondansetron, acetaminophen, glucose, dextrose, glucagon (rDNA), dextrose    Objective     Vitals:  No data found. Average, Min, and Max for last 24 hours Vitals:  TEMPERATURE:  Temp  Av.3 °F (37.4 °C)  Min: 99 °F (37.2 °C)  Max: 99.5 °F (37.5 °C)    RESPIRATIONS RANGE: Resp  Av  Min: 15  Max: 20    PULSE RANGE: Pulse  Av.7  Min: 90  Max: 94    BLOOD PRESSURE RANGE:  Systolic (21URO), XYR:673 , Min:152 , LQB:195   ; Diastolic (39DPM), AQW:31, Min:70, Max:92      PULSE OXIMETRY RANGE: SpO2  Av.7 %  Min: 96 %  Max: 97 %    I/O last 3 completed shifts:  In: -   Out: 700 [Urine:700]    CBC:  Recent Labs     19  1503 19  1843 20  0540   WBC 7.6  --  8.9   HGB 13.5  --  11.0*   HCT 40.6  --  34.5*     --  235   CRP 89.2* 151.5*  --         BMP:  Recent Labs     19  1503 19  1735 20  0540   *  --  132*   K 4.1  --  3.8   CL 92*  --  98   CO2 26  --  23   BUN 25*  --  14   CREATININE 1.04*  --  1.07*   GLUCOSE 712* 619* 290*   CALCIUM 9.1  --  7.9*        Coags:  No results for input(s): APTT, PROT, INR in the last 72 hours. Lab Results   Component Value Date    SEDRATE 116 (H) 2019     Recent Labs     19  1503 19  1843   CRP 89.2* 151.5*       Lower Extremity Physical Exam:  Vascular: DP and PT pulses are palpable. CFT <3 seconds to all digits. Hair growth is absent to the level of the digits.   +1 pitting edema to the right foot     Neuro: Saph/sural/SP/DP/plantar sensation diminished to light touch.     Musculoskeletal: Muscle strength is 5/5 to all lower extremity muscle groups on Left, neurologic complication, with long-term current use of insulin (HCC)    Chronic diastolic congestive heart failure (HCC)    Hyperglycemia    Non compliance w medication regimen    H/O deep venous thrombosis  Resolved Problems:    * No resolved hospital problems. *       Plan     · Patient examined and evaluated at bedside   · Treatment options discussed in detail with the patient  · Xrays neg for OM or gas  · MRI of hindfoot: neg for deep abscess in ankle or dorsum or right foot  · F/u MRI of forefoot to evaluate for deep abscess  · Venous duplex negative for DVT  · Medical management by primary team. Appreciate recommendations  · Abx: Vancomycin  ? ID following, appreciate recommendations  ? Wound cx: GPC, GNR     · Will monitor on antibiotics and f/u forefoot MRI  · Dressing applied to Right foot: betadine, DSD  · NWB to Right lower extremity in surgical shoe. Surgical shoe ordered  · Will discuss with Dr. Manoj James, DPM   Podiatric Medicine & Surgery   1/1/2020 at 6:54 AM    I performed a history and physical examination of the patient and discussed management with the resident. I reviewed the residents note and agree with the documented findings and plan of care. Any areas of disagreement are noted on the chart. I was personally present for the key portions of any procedures. I have documented in the chart those procedures where I was not present during the key portions. I have reviewed the Podiatry Resident progress note. I agree with the chief complaint, past medical history, past surgical history, allergies, medications, social and family history as documented unless otherwise noted below. Documentation of the HPI, Physical Exam and Medical Decision Making performed by medical students or scribes is based on my personal performance of the HPI, PE and MDM.  I have personally evaluated this patient and have completed at least one if not all key elements of the E/M (history, physical exam, and EDWARDO). Additional findings are as noted.      Hakeem Hagen DPM on 1/2/2020 at 9:89 AM  Board Certified, American Board of Podiatric Surgery  Fellow, [employer] [facility] [employer] [facility]

## 2020-01-02 ENCOUNTER — APPOINTMENT (OUTPATIENT)
Dept: MRI IMAGING | Age: 45
DRG: 305 | End: 2020-01-02
Payer: COMMERCIAL

## 2020-01-02 PROBLEM — R73.9 HYPERGLYCEMIA: Status: RESOLVED | Noted: 2019-12-31 | Resolved: 2020-01-02

## 2020-01-02 PROBLEM — Z86.718 H/O DEEP VENOUS THROMBOSIS: Status: RESOLVED | Noted: 2019-12-31 | Resolved: 2020-01-02

## 2020-01-02 PROBLEM — L08.9 FOOT INFECTION: Status: RESOLVED | Noted: 2019-12-30 | Resolved: 2020-01-02

## 2020-01-02 PROBLEM — Z91.14 NON COMPLIANCE W MEDICATION REGIMEN: Status: RESOLVED | Noted: 2019-12-31 | Resolved: 2020-01-02

## 2020-01-02 PROBLEM — N17.9 AKI (ACUTE KIDNEY INJURY) (HCC): Status: ACTIVE | Noted: 2020-01-02

## 2020-01-02 LAB
BUN BLDV-MCNC: 19 MG/DL (ref 6–20)
CREAT SERPL-MCNC: 1.2 MG/DL (ref 0.5–0.9)
GFR AFRICAN AMERICAN: 59 ML/MIN
GFR NON-AFRICAN AMERICAN: 49 ML/MIN
GFR SERPL CREATININE-BSD FRML MDRD: ABNORMAL ML/MIN/{1.73_M2}
GFR SERPL CREATININE-BSD FRML MDRD: ABNORMAL ML/MIN/{1.73_M2}
GLUCOSE BLD-MCNC: 247 MG/DL (ref 65–105)
GLUCOSE BLD-MCNC: 267 MG/DL (ref 65–105)
GLUCOSE BLD-MCNC: 300 MG/DL (ref 65–105)
VANCOMYCIN TROUGH DATE LAST DOSE: NORMAL
VANCOMYCIN TROUGH DOSE AMOUNT: NORMAL
VANCOMYCIN TROUGH TIME LAST DOSE: NORMAL
VANCOMYCIN TROUGH: 12.6 UG/ML (ref 10–20)

## 2020-01-02 PROCEDURE — 82565 ASSAY OF CREATININE: CPT

## 2020-01-02 PROCEDURE — 99232 SBSQ HOSP IP/OBS MODERATE 35: CPT | Performed by: INTERNAL MEDICINE

## 2020-01-02 PROCEDURE — 84520 ASSAY OF UREA NITROGEN: CPT

## 2020-01-02 PROCEDURE — 6370000000 HC RX 637 (ALT 250 FOR IP): Performed by: NURSE PRACTITIONER

## 2020-01-02 PROCEDURE — 36415 COLL VENOUS BLD VENIPUNCTURE: CPT

## 2020-01-02 PROCEDURE — A9576 INJ PROHANCE MULTIPACK: HCPCS | Performed by: STUDENT IN AN ORGANIZED HEALTH CARE EDUCATION/TRAINING PROGRAM

## 2020-01-02 PROCEDURE — 2580000003 HC RX 258: Performed by: STUDENT IN AN ORGANIZED HEALTH CARE EDUCATION/TRAINING PROGRAM

## 2020-01-02 PROCEDURE — 6360000002 HC RX W HCPCS: Performed by: EMERGENCY MEDICINE

## 2020-01-02 PROCEDURE — 73720 MRI LWR EXTREMITY W/O&W/DYE: CPT

## 2020-01-02 PROCEDURE — 6370000000 HC RX 637 (ALT 250 FOR IP): Performed by: PHYSICIAN ASSISTANT

## 2020-01-02 PROCEDURE — 6360000004 HC RX CONTRAST MEDICATION: Performed by: STUDENT IN AN ORGANIZED HEALTH CARE EDUCATION/TRAINING PROGRAM

## 2020-01-02 PROCEDURE — 82947 ASSAY GLUCOSE BLOOD QUANT: CPT

## 2020-01-02 PROCEDURE — 6370000000 HC RX 637 (ALT 250 FOR IP): Performed by: FAMILY MEDICINE

## 2020-01-02 PROCEDURE — 99233 SBSQ HOSP IP/OBS HIGH 50: CPT | Performed by: INTERNAL MEDICINE

## 2020-01-02 PROCEDURE — 2580000003 HC RX 258: Performed by: EMERGENCY MEDICINE

## 2020-01-02 PROCEDURE — 80202 ASSAY OF VANCOMYCIN: CPT

## 2020-01-02 PROCEDURE — 1200000000 HC SEMI PRIVATE

## 2020-01-02 PROCEDURE — 6360000002 HC RX W HCPCS: Performed by: FAMILY MEDICINE

## 2020-01-02 RX ORDER — SODIUM CHLORIDE 0.9 % (FLUSH) 0.9 %
10 SYRINGE (ML) INJECTION 2 TIMES DAILY
Status: DISCONTINUED | OUTPATIENT
Start: 2020-01-02 | End: 2020-01-12 | Stop reason: HOSPADM

## 2020-01-02 RX ADMIN — VANCOMYCIN HYDROCHLORIDE 1750 MG: 10 INJECTION, POWDER, LYOPHILIZED, FOR SOLUTION INTRAVENOUS at 21:02

## 2020-01-02 RX ADMIN — METFORMIN HYDROCHLORIDE 1000 MG: 500 TABLET ORAL at 08:27

## 2020-01-02 RX ADMIN — APIXABAN 2.5 MG: 2.5 TABLET, FILM COATED ORAL at 21:02

## 2020-01-02 RX ADMIN — LEVOTHYROXINE SODIUM 50 MCG: 100 TABLET ORAL at 08:27

## 2020-01-02 RX ADMIN — SIMVASTATIN 20 MG: 20 TABLET, FILM COATED ORAL at 21:02

## 2020-01-02 RX ADMIN — CARVEDILOL 12.5 MG: 12.5 TABLET, FILM COATED ORAL at 16:49

## 2020-01-02 RX ADMIN — METFORMIN HYDROCHLORIDE 1000 MG: 500 TABLET ORAL at 16:49

## 2020-01-02 RX ADMIN — SODIUM CHLORIDE, PRESERVATIVE FREE 10 ML: 5 INJECTION INTRAVENOUS at 21:01

## 2020-01-02 RX ADMIN — APIXABAN 2.5 MG: 2.5 TABLET, FILM COATED ORAL at 08:27

## 2020-01-02 RX ADMIN — GADOTERIDOL 20 ML: 279.3 INJECTION, SOLUTION INTRAVENOUS at 10:41

## 2020-01-02 RX ADMIN — HYDROMORPHONE HYDROCHLORIDE 0.5 MG: 1 INJECTION, SOLUTION INTRAMUSCULAR; INTRAVENOUS; SUBCUTANEOUS at 01:43

## 2020-01-02 RX ADMIN — INSULIN LISPRO 5 UNITS: 100 INJECTION, SOLUTION INTRAVENOUS; SUBCUTANEOUS at 21:05

## 2020-01-02 RX ADMIN — INSULIN GLARGINE 40 UNITS: 100 INJECTION, SOLUTION SUBCUTANEOUS at 21:06

## 2020-01-02 RX ADMIN — HYDROMORPHONE HYDROCHLORIDE 0.5 MG: 1 INJECTION, SOLUTION INTRAMUSCULAR; INTRAVENOUS; SUBCUTANEOUS at 20:59

## 2020-01-02 RX ADMIN — HYDROMORPHONE HYDROCHLORIDE 0.5 MG: 1 INJECTION, SOLUTION INTRAMUSCULAR; INTRAVENOUS; SUBCUTANEOUS at 13:33

## 2020-01-02 RX ADMIN — HYDROMORPHONE HYDROCHLORIDE 0.5 MG: 1 INJECTION, SOLUTION INTRAMUSCULAR; INTRAVENOUS; SUBCUTANEOUS at 09:07

## 2020-01-02 RX ADMIN — LISINOPRIL AND HYDROCHLOROTHIAZIDE 1 TABLET: 25; 20 TABLET ORAL at 08:27

## 2020-01-02 RX ADMIN — INSULIN LISPRO 9 UNITS: 100 INJECTION, SOLUTION INTRAVENOUS; SUBCUTANEOUS at 08:30

## 2020-01-02 RX ADMIN — CARVEDILOL 12.5 MG: 12.5 TABLET, FILM COATED ORAL at 08:27

## 2020-01-02 RX ADMIN — HYDROMORPHONE HYDROCHLORIDE 0.5 MG: 1 INJECTION, SOLUTION INTRAMUSCULAR; INTRAVENOUS; SUBCUTANEOUS at 16:57

## 2020-01-02 RX ADMIN — ASPIRIN 81 MG 81 MG: 81 TABLET ORAL at 08:27

## 2020-01-02 RX ADMIN — INSULIN LISPRO 6 UNITS: 100 INJECTION, SOLUTION INTRAVENOUS; SUBCUTANEOUS at 16:48

## 2020-01-02 RX ADMIN — INSULIN LISPRO 12 UNITS: 100 INJECTION, SOLUTION INTRAVENOUS; SUBCUTANEOUS at 13:32

## 2020-01-02 ASSESSMENT — ENCOUNTER SYMPTOMS
COLOR CHANGE: 0
BACK PAIN: 0
SHORTNESS OF BREATH: 0
BLOOD IN STOOL: 0
VOMITING: 0
NAUSEA: 0
ABDOMINAL PAIN: 0
WHEEZING: 0
EYE DISCHARGE: 0

## 2020-01-02 ASSESSMENT — PAIN SCALES - GENERAL
PAINLEVEL_OUTOF10: 9
PAINLEVEL_OUTOF10: 10
PAINLEVEL_OUTOF10: 4
PAINLEVEL_OUTOF10: 9
PAINLEVEL_OUTOF10: 7
PAINLEVEL_OUTOF10: 2
PAINLEVEL_OUTOF10: 10

## 2020-01-02 NOTE — PLAN OF CARE
Problem: Falls - Risk of:  Goal: Will remain free from falls  Description  Will remain free from falls  1/2/2020 0530 by Estee Medrano RN  Outcome: Ongoing  1/1/2020 1635 by Lian Anthony RN  Outcome: Ongoing  Goal: Absence of physical injury  Description  Absence of physical injury  1/2/2020 0530 by Estee Medrano RN  Outcome: Ongoing  1/1/2020 1635 by Lian Anthony RN  Outcome: Ongoing     Problem: Skin Integrity:  Goal: Demonstration of wound healing without infection will improve  Description  Demonstration of wound healing without infection will improve  1/2/2020 0530 by Estee Medrano RN  Outcome: Ongoing  1/1/2020 1635 by Lian Anthony RN  Outcome: Ongoing  Goal: Complications related to intravenous access or infusion will be avoided or minimized  Description  Complications related to intravenous access or infusion will be avoided or minimized  1/2/2020 0530 by Estee Medrano RN  Outcome: Ongoing  1/1/2020 1635 by Lian Anthony RN  Outcome: Ongoing  Goal: Will show no infection signs and symptoms  Description  Will show no infection signs and symptoms  1/2/2020 0530 by Estee Medrano RN  Outcome: Ongoing  1/1/2020 1635 by Lian Anthony RN  Outcome: Ongoing  Goal: Absence of new skin breakdown  Description  Absence of new skin breakdown  1/2/2020 0530 by Esete Medrano RN  Outcome: Ongoing  1/1/2020 1635 by Lian Anthony RN  Outcome: Ongoing     Problem: Pain:  Goal: Pain level will decrease  Description  Pain level will decrease  1/2/2020 0530 by Estee Medrano RN  Outcome: Ongoing  1/1/2020 1635 by Lian Anthony RN  Outcome: Ongoing  Goal: Control of acute pain  Description  Control of acute pain  1/2/2020 0530 by Estee Medrano RN  Outcome: Ongoing  1/1/2020 1635 by Lian Anthony RN  Outcome: Ongoing  Goal: Control of chronic pain  Description  Control of chronic pain  1/2/2020 0530 by Estee Medrano RN  Outcome: Ongoing  1/1/2020 1635 by Brandon Reid

## 2020-01-02 NOTE — PROGRESS NOTES
Progress Note  Podiatric Medicine and Surgery     Subjective     CC: Right foot pain    Patient seen and examined at bedside. Patient reports she vomited last night but she feels it was due to her pain medicine  Patient seen and examined at bedside, chart results reviewed. Intermittent hypertension, other vital signs stable  Patient denies fever, chills, chest pain, shortness of breath  Patient reports nausea and vomiting  Patient states pain to right foot is not improved from yesterday    HPI :    Sarika Sheppard is a 38 y.o. female with a past medical history of DM2 with neuropathy was seen at [facility]. [place] for right foot pain and wound. Pt states that about 1 week ago her right great toe became very painful and this morning she noted worsening pain and new redness of her right great toe. Pt denies any trauma or stepping on anything sharp. She notes her redness progressively has moved up towards her ankle. Patient is originally from [place] and has moved here, she currently does not have her insulin. Pt is also complaining of calf pain. She has a history of a DVT and PE. Pt denies any N/V/F/C/CP/SOB.     PCP is Sveta Velasquez, APRN - CNP    ROS: Denies N/V/F/C/SOB/CP. Otherwise negative except at stated in the HPI.      Medications:  Scheduled Meds:   carvedilol  12.5 mg Oral BID WC    sodium chloride flush  10 mL Intravenous 2 times per day    sodium chloride flush  10 mL Intravenous 2 times per day    vancomycin  1,750 mg Intravenous Q24H    vancomycin (VANCOCIN) intermittent dosing (placeholder)   Other RX Placeholder    insulin glargine  40 Units Subcutaneous Nightly    sodium chloride flush  10 mL Intravenous BID    aspirin  81 mg Oral Daily    levothyroxine  50 mcg Oral Daily    lisinopril-hydrochlorothiazide  1 tablet Oral Daily    metFORMIN  1,000 mg Oral BID WC    simvastatin  20 mg Oral Nightly    apixaban  2.5 mg Oral BID    insulin lispro  0-18 Units Subcutaneous TID     insulin lispro  0-9 Units Subcutaneous Nightly       Continuous Infusions:   sodium chloride 75 mL/hr at 20 0833    sodium chloride 100 mL/hr at 19 0127    dextrose         PRN Meds:diphenhydrAMINE, sodium chloride flush, sodium chloride flush, potassium chloride **OR** potassium alternative oral replacement **OR** potassium chloride, magnesium sulfate, magnesium hydroxide, nicotine, HYDROmorphone, ibuprofen, ondansetron, acetaminophen, glucose, dextrose, glucagon (rDNA), dextrose    Objective     Vitals:  Patient Vitals for the past 8 hrs:   Weight   20 0651 268 lb 6.4 oz (121.7 kg)     Average, Min, and Max for last 24 hours Vitals:  TEMPERATURE:  No data recorded    RESPIRATIONS RANGE: No data recorded    PULSE RANGE: Pulse  Av  Min: 94  Max: 94    BLOOD PRESSURE RANGE:  No data recorded.  ; No data recorded. PULSE OXIMETRY RANGE: No data recorded    I/O last 3 completed shifts: In: 900 [I.V.:900]  Out: 1800 [Urine:1800]    CBC:  Recent Labs     19  1503 19  1843 20  0540   WBC 7.6  --  8.9   HGB 13.5  --  11.0*   HCT 40.6  --  34.5*     --  235   CRP 89.2* 151.5*  --         BMP:  Recent Labs     19  1503 19  1735 20  0540 20  0434   *  --  132*  --    K 4.1  --  3.8  --    CL 92*  --  98  --    CO2 26  --  23  --    BUN 25*  --  14 19   CREATININE 1.04*  --  1.07* 1.20*   GLUCOSE 712* 619* 290*  --    CALCIUM 9.1  --  7.9*  --         Coags:  No results for input(s): APTT, PROT, INR in the last 72 hours. Lab Results   Component Value Date    SEDRATE 116 (H) 2019     Recent Labs     19  1503 19  1843   CRP 89.2* 151.5*       Lower Extremity Physical Exam:    Vascular: DP and PT pulses are palpable. CFT <3 seconds to all digits. Hair growth is absent to the level of the digits.   +1 pitting edema to the right foot     Neuro: Saph/sural/SP/DP/plantar sensation diminished to light touch.     Musculoskeletal: Muscle strength is 5/5 to all lower extremity muscle groups on Left, deferred on the right. Gross deformity is absent. Tenderness to palpation of the plantar and dorsal aspect of the right hallux and posterior calf. Painful ROM of right ankle     Dermatologic: Full thickness pinpoint ulcer #1 located plantar sulcus of right hallux and measures approximately 0.1 cm x 0.1 cm x 0.5 cm. Base is granular. Periwound skin is hyperkeratotic. Serosanguinous drainage noted with no associated mal odor. Erythema with dorsal streaking improved from previous exam- minimal associated increase in warmth. No fluctuance noted. Does not probe to bone, sinus track, or undermine. No fluctuance, crepitus, or induration. Interdigital maceration absent.      Left foot is warm and dry with no open wounds       Clinical Images:                      Imaging:   MRI FOOT RIGHT W WO CONTRAST   Final Result   1. No osteomyelitis or other acute osseous abnormality of the hindfoot. 2. Circumferential subcutaneous edema about the ankle and along the dorsum of   the midfoot compatible with bland edema versus cellulitis. No deep soft   tissue ulceration or drainable fluid collection. VL DUP LOWER EXTREMITY VENOUS RIGHT   Final Result      XR FOOT RIGHT (MIN 3 VIEWS)   Final Result   Soft tissue edema surrounding the 1st digit without evidence for radiopaque   foreign body or subcutaneous air. MRI FOOT RIGHT W WO CONTRAST    (Results Pending)       Cultures:   Wound culture, right hallux 12/31/2019: GPC and GNR    Assessment   Hany Haider is a 38 y.o. female with   1. Diabetic wound to the level of subcutaneous tissue, right hallux  2. Cellulitis, right foot  3. DM2 with neuropathy  4. History of DVT  5. Chronic CHF    Principal Problem:     Foot infection  Active Problems:    Hypertension    Morbid obesity due to excess calories ([address]    Essential hypertension    Hypothyroidism    Diabetic polyneuropathy (HCC)    Type 2 diabetes mellitus with neurologic complication, with long-term current use of insulin (HCC)    Chronic diastolic congestive heart failure (HCC)    Hyperglycemia    Non compliance w medication regimen    H/O deep venous thrombosis    Cellulitis of foot  Resolved Problems:    * No resolved hospital problems. *       Plan     · Patient examined and evaluated at bedside   · Treatment options discussed in detail with the patient  · Xrays neg for OM or gas  · MRI of hindfoot: neg for deep abscess in ankle or dorsum or right foot  · F/u MRI of forefoot to evaluate for deep abscess- pending  · Venous duplex negative for DVT  · Medical management by primary team. Appreciate recommendations  · Abx: Vancomycin  ? ID following, appreciate recommendations  ? Wound cx: GPC, GNR     · Will monitor on antibiotics and f/u forefoot MRI  · Continue daily dressing changes to right foot: betadine to right hallux wound, DSD  · NWB to Right lower extremity in surgical shoe. Surgical shoe ordered  · Will discuss with Dr. Ye Walsh, DPM   Podiatric Medicine & Surgery   1/2/2020 at 9:32 AM      I performed a history and physical examination of the patient and discussed management with the resident. I reviewed the residents note and agree with the documented findings and plan of care. Any areas of disagreement are noted on the chart. I was personally present for the key portions of any procedures. I have documented in the chart those procedures where I was not present during the key portions. I have reviewed the Podiatry Resident progress note. I agree with the chief complaint, past medical history, past surgical history, allergies, medications, social and family history as documented unless otherwise noted below. Documentation of the HPI, Physical Exam and Medical Decision Making performed by medical students or scribes is based on my personal performance of the HPI, PE and MDM.  I have personally evaluated this patient and have completed at least one if not all key elements of the E/M (history, physical exam, and MDM). Additional findings are as noted.      Silvio Matson DPM on 1/2/2020 at 14:38 AM  Board Certified, American Board of Podiatric Surgery  Fellow, [employer] and [employer] [facility]

## 2020-01-02 NOTE — PROGRESS NOTES
Mary George 18    Progress Note    1/2/2020    11:44 AM    Name:   Tommie Hernandez  MRN:     [id number]     Acct:      [de-identified]   Room:   2026/2026-01  IP Day:  3  Admit Date:  12/30/2019  2:09 PM    PCP:   KAYLEE Dowell CNP  Code Status:  Full Code    Subjective:     C/C:   Chief Complaint   Patient presents with    Foot Pain     complaining of right foot pain to bottom of foot near crease of great toe and lateral right foot, she states that she started to notice swelling to dorsal aspect of foot with mild redness and pain    Leg Pain     complaining of right lower extremity pain to right medial aspect      Interval History Status: improved. Pain improved  No fever    Brief History:     Patient is a 38 yr old female who presents to ED with c/p Right foot pain.  Right great toe noted to be deep red/pruple in color with swelling extending into dorsum of foot along with red/purple coloring.  Pulses are palpable, brisk cap refill, and warmth noted.  Ms. Long Goff denies any injury to the affected toe/foot, denies any fevers, chills, N/V/D andstates the symptoms have been ongoing for approx 24 hrs.  She has baseline neuropathy and denies any changes in N/T.  Presenting labs with elevated inflammatory markers along with significant Hyperglycemia, no gas on radiological imaging and doppler negative for DVT.    PMH significant for DVT (non-compliant with Xarelto secondary to expiring insurance), DMII controlled with oral agents (off all oral agents secondary to insurance issues), HTN, TIA, hypothyroidism, PANCHO (non-compliant with CPAP).   Last ECHO done in 2015: EF:55%, LVH with Diastolic Dysfunction     On PE, patient is resting in bed, awake alert and oriented.  C/o pain 4/10 ro RLE, Discoloration and swelling noted to left great toe and into foot with adequate pulses/flow.  Denies any additional symptoms, VSS and no clinical signs of distress    Review of Systems:     Review of Systems   Constitutional: Negative for appetite change and fever. HENT: Negative for congestion and ear discharge. Eyes: Negative for discharge and visual disturbance. Respiratory: Negative for shortness of breath and wheezing. Cardiovascular: Negative for chest pain, palpitations and leg swelling. Gastrointestinal: Negative for abdominal pain, blood in stool, nausea and vomiting. Endocrine: Negative for cold intolerance and heat intolerance. Genitourinary: Negative for dysuria and frequency. Musculoskeletal: Negative for arthralgias, back pain and joint swelling. Skin: Negative for color change and rash. Neurological: Negative for dizziness, tremors, seizures and light-headedness. Psychiatric/Behavioral: Negative for agitation and confusion. Medications:      Allergies:  No Known Allergies    Current Meds:   Scheduled Meds:    sodium chloride flush  10 mL Intravenous BID    carvedilol  12.5 mg Oral BID     sodium chloride flush  10 mL Intravenous 2 times per day    sodium chloride flush  10 mL Intravenous 2 times per day    vancomycin  1,750 mg Intravenous Q24H    vancomycin (VANCOCIN) intermittent dosing (placeholder)   Other RX Placeholder    insulin glargine  40 Units Subcutaneous Nightly    sodium chloride flush  10 mL Intravenous BID    aspirin  81 mg Oral Daily    levothyroxine  50 mcg Oral Daily    lisinopril-hydrochlorothiazide  1 tablet Oral Daily    metFORMIN  1,000 mg Oral BID WC    simvastatin  20 mg Oral Nightly    apixaban  2.5 mg Oral BID    insulin lispro  0-18 Units Subcutaneous TID     insulin lispro  0-9 Units Subcutaneous Nightly     Continuous Infusions:    sodium chloride 75 mL/hr at 01/02/20 0833    dextrose       PRN Meds: diphenhydrAMINE, sodium chloride flush, sodium chloride flush, potassium chloride **OR** potassium alternative oral replacement **OR** potassium chloride, magnesium sulfate,

## 2020-01-03 LAB
BUN BLDV-MCNC: 18 MG/DL (ref 6–20)
CREAT SERPL-MCNC: 1.26 MG/DL (ref 0.5–0.9)
GFR AFRICAN AMERICAN: 56 ML/MIN
GFR NON-AFRICAN AMERICAN: 46 ML/MIN
GFR SERPL CREATININE-BSD FRML MDRD: ABNORMAL ML/MIN/{1.73_M2}
GFR SERPL CREATININE-BSD FRML MDRD: ABNORMAL ML/MIN/{1.73_M2}
GLUCOSE BLD-MCNC: 186 MG/DL (ref 65–105)
GLUCOSE BLD-MCNC: 193 MG/DL (ref 65–105)
GLUCOSE BLD-MCNC: 207 MG/DL (ref 65–105)
GLUCOSE BLD-MCNC: 211 MG/DL (ref 65–105)

## 2020-01-03 PROCEDURE — 2580000003 HC RX 258: Performed by: NURSE PRACTITIONER

## 2020-01-03 PROCEDURE — 84520 ASSAY OF UREA NITROGEN: CPT

## 2020-01-03 PROCEDURE — 6370000000 HC RX 637 (ALT 250 FOR IP): Performed by: PHYSICIAN ASSISTANT

## 2020-01-03 PROCEDURE — 6370000000 HC RX 637 (ALT 250 FOR IP): Performed by: NURSE PRACTITIONER

## 2020-01-03 PROCEDURE — 99233 SBSQ HOSP IP/OBS HIGH 50: CPT | Performed by: INTERNAL MEDICINE

## 2020-01-03 PROCEDURE — 82947 ASSAY GLUCOSE BLOOD QUANT: CPT

## 2020-01-03 PROCEDURE — 2580000003 HC RX 258: Performed by: STUDENT IN AN ORGANIZED HEALTH CARE EDUCATION/TRAINING PROGRAM

## 2020-01-03 PROCEDURE — 1200000000 HC SEMI PRIVATE

## 2020-01-03 PROCEDURE — 99232 SBSQ HOSP IP/OBS MODERATE 35: CPT | Performed by: INTERNAL MEDICINE

## 2020-01-03 PROCEDURE — 36415 COLL VENOUS BLD VENIPUNCTURE: CPT

## 2020-01-03 PROCEDURE — 6370000000 HC RX 637 (ALT 250 FOR IP): Performed by: FAMILY MEDICINE

## 2020-01-03 PROCEDURE — 6360000002 HC RX W HCPCS: Performed by: FAMILY MEDICINE

## 2020-01-03 PROCEDURE — 6360000002 HC RX W HCPCS: Performed by: NURSE PRACTITIONER

## 2020-01-03 PROCEDURE — 2580000003 HC RX 258: Performed by: INTERNAL MEDICINE

## 2020-01-03 PROCEDURE — 6360000002 HC RX W HCPCS: Performed by: INTERNAL MEDICINE

## 2020-01-03 PROCEDURE — 82565 ASSAY OF CREATININE: CPT

## 2020-01-03 RX ADMIN — INSULIN LISPRO 1 UNITS: 100 INJECTION, SOLUTION INTRAVENOUS; SUBCUTANEOUS at 20:52

## 2020-01-03 RX ADMIN — CEFTRIAXONE SODIUM 2 G: 2 INJECTION, POWDER, FOR SOLUTION INTRAMUSCULAR; INTRAVENOUS at 15:30

## 2020-01-03 RX ADMIN — LEVOTHYROXINE SODIUM 50 MCG: 100 TABLET ORAL at 09:19

## 2020-01-03 RX ADMIN — SODIUM CHLORIDE, PRESERVATIVE FREE 10 ML: 5 INJECTION INTRAVENOUS at 09:20

## 2020-01-03 RX ADMIN — APIXABAN 2.5 MG: 2.5 TABLET, FILM COATED ORAL at 09:20

## 2020-01-03 RX ADMIN — METFORMIN HYDROCHLORIDE 1000 MG: 500 TABLET ORAL at 09:20

## 2020-01-03 RX ADMIN — APIXABAN 2.5 MG: 2.5 TABLET, FILM COATED ORAL at 20:42

## 2020-01-03 RX ADMIN — CARVEDILOL 12.5 MG: 12.5 TABLET, FILM COATED ORAL at 16:51

## 2020-01-03 RX ADMIN — SODIUM CHLORIDE: 9 INJECTION, SOLUTION INTRAVENOUS at 05:43

## 2020-01-03 RX ADMIN — HYDROMORPHONE HYDROCHLORIDE 0.5 MG: 1 INJECTION, SOLUTION INTRAMUSCULAR; INTRAVENOUS; SUBCUTANEOUS at 16:51

## 2020-01-03 RX ADMIN — HYDROMORPHONE HYDROCHLORIDE 0.5 MG: 1 INJECTION, SOLUTION INTRAMUSCULAR; INTRAVENOUS; SUBCUTANEOUS at 05:48

## 2020-01-03 RX ADMIN — SODIUM CHLORIDE, PRESERVATIVE FREE 10 ML: 5 INJECTION INTRAVENOUS at 20:49

## 2020-01-03 RX ADMIN — METFORMIN HYDROCHLORIDE 1000 MG: 500 TABLET ORAL at 16:51

## 2020-01-03 RX ADMIN — ASPIRIN 81 MG 81 MG: 81 TABLET ORAL at 09:20

## 2020-01-03 RX ADMIN — SODIUM CHLORIDE, PRESERVATIVE FREE 10 ML: 5 INJECTION INTRAVENOUS at 09:25

## 2020-01-03 RX ADMIN — LISINOPRIL AND HYDROCHLOROTHIAZIDE 1 TABLET: 25; 20 TABLET ORAL at 09:20

## 2020-01-03 RX ADMIN — HYDROMORPHONE HYDROCHLORIDE 0.5 MG: 1 INJECTION, SOLUTION INTRAMUSCULAR; INTRAVENOUS; SUBCUTANEOUS at 02:41

## 2020-01-03 RX ADMIN — SIMVASTATIN 20 MG: 20 TABLET, FILM COATED ORAL at 20:42

## 2020-01-03 RX ADMIN — INSULIN GLARGINE 40 UNITS: 100 INJECTION, SOLUTION SUBCUTANEOUS at 20:42

## 2020-01-03 RX ADMIN — CARVEDILOL 12.5 MG: 12.5 TABLET, FILM COATED ORAL at 09:19

## 2020-01-03 RX ADMIN — HYDROMORPHONE HYDROCHLORIDE 0.5 MG: 1 INJECTION, SOLUTION INTRAMUSCULAR; INTRAVENOUS; SUBCUTANEOUS at 12:36

## 2020-01-03 RX ADMIN — HYDROMORPHONE HYDROCHLORIDE 0.5 MG: 1 INJECTION, SOLUTION INTRAMUSCULAR; INTRAVENOUS; SUBCUTANEOUS at 09:42

## 2020-01-03 RX ADMIN — HYDROMORPHONE HYDROCHLORIDE 0.5 MG: 1 INJECTION, SOLUTION INTRAMUSCULAR; INTRAVENOUS; SUBCUTANEOUS at 20:42

## 2020-01-03 RX ADMIN — INSULIN LISPRO 3 UNITS: 100 INJECTION, SOLUTION INTRAVENOUS; SUBCUTANEOUS at 12:35

## 2020-01-03 RX ADMIN — INSULIN LISPRO 6 UNITS: 100 INJECTION, SOLUTION INTRAVENOUS; SUBCUTANEOUS at 09:25

## 2020-01-03 RX ADMIN — INSULIN LISPRO 6 UNITS: 100 INJECTION, SOLUTION INTRAVENOUS; SUBCUTANEOUS at 17:39

## 2020-01-03 RX ADMIN — ONDANSETRON 4 MG: 2 INJECTION INTRAMUSCULAR; INTRAVENOUS at 09:42

## 2020-01-03 ASSESSMENT — PAIN SCALES - GENERAL
PAINLEVEL_OUTOF10: 9
PAINLEVEL_OUTOF10: 10
PAINLEVEL_OUTOF10: 9
PAINLEVEL_OUTOF10: 6
PAINLEVEL_OUTOF10: 10
PAINLEVEL_OUTOF10: 9
PAINLEVEL_OUTOF10: 9

## 2020-01-03 ASSESSMENT — PAIN DESCRIPTION - LOCATION
LOCATION: FOOT
LOCATION: FOOT

## 2020-01-03 ASSESSMENT — ENCOUNTER SYMPTOMS
ABDOMINAL PAIN: 0
EYE DISCHARGE: 0
VOMITING: 0
COLOR CHANGE: 0
BLOOD IN STOOL: 0
NAUSEA: 0
BACK PAIN: 0
SHORTNESS OF BREATH: 0
WHEEZING: 0

## 2020-01-03 ASSESSMENT — PAIN DESCRIPTION - ORIENTATION: ORIENTATION: RIGHT

## 2020-01-03 ASSESSMENT — PAIN DESCRIPTION - FREQUENCY: FREQUENCY: CONTINUOUS

## 2020-01-03 NOTE — PROGRESS NOTES
Progress Note  Podiatric Medicine and Surgery     Subjective     CC: Right foot pain    Patient seen and examined at bedside  Hypertension, other vital signs stable  Patient reports pain to right foot is controlled with medicine but not improving  Patient feels redness to right foot might be getting worse. HPI :    Ramu Aguirre is a 38 y.o. female with a past medical history of DM2 with neuropathy was seen at [facility]. [facility] for right foot pain and wound. Pt states that about 1 week ago her right great toe became very painful and this morning she noted worsening pain and new redness of her right great toe. Pt denies any trauma or stepping on anything sharp. She notes her redness progressively has moved up towards her ankle. Patient is originally from [place] and has moved here, she currently does not have her insulin. Pt is also complaining of calf pain. She has a history of a DVT and PE. Pt denies any N/V/F/C/CP/SOB.     PCP is KAYLEE Garcias - CNP    ROS: Denies N/V/F/C/SOB/CP. Otherwise negative except at stated in the HPI.      Medications:  Scheduled Meds:   sodium chloride flush  10 mL Intravenous BID    carvedilol  12.5 mg Oral BID     sodium chloride flush  10 mL Intravenous 2 times per day    sodium chloride flush  10 mL Intravenous 2 times per day    vancomycin  1,750 mg Intravenous Q24H    vancomycin (VANCOCIN) intermittent dosing (placeholder)   Other RX Placeholder    insulin glargine  40 Units Subcutaneous Nightly    sodium chloride flush  10 mL Intravenous BID    aspirin  81 mg Oral Daily    levothyroxine  50 mcg Oral Daily    lisinopril-hydrochlorothiazide  1 tablet Oral Daily    metFORMIN  1,000 mg Oral BID     simvastatin  20 mg Oral Nightly    apixaban  2.5 mg Oral BID    insulin lispro  0-18 Units Subcutaneous TID     insulin lispro  0-9 Units Subcutaneous Nightly       Continuous Infusions:   sodium chloride 75 mL/hr at 01/03/20 0543    dextrose         PRN plantar and dorsal aspect of the right hallux. Painful ROM of right ankle     Dermatologic: Full thickness pinpoint ulcer #1 located plantar sulcus of right hallux and measures approximately 0.1 cm x 0.1 cm x 0.5 cm. Base is granular. Periwound skin is hyperkeratotic. No drainage noted from the wound. Erythema on dorsum of foot improving. No fluctuance noted. Does not probe to bone, sinus track, or undermine. No fluctuance, crepitus, or induration. Interdigital maceration absent.      Left foot is warm and dry with no open wounds       Clinical Images:            Imaging:   MRI FOOT RIGHT W WO CONTRAST   Final Result   1. No evidence of osteomyelitis in the right forefoot. 2. Focal confluent soft tissue edema at the plantar aspect of the proximal   great toe, possibly focal cellulitis or phlegmon. No definite organized   fluid collection to indicate abscess at this time. This area measures   approximately 1.9 x 0.8 x 2.1 cm.   3. A couple low signal foci are seen within the plantar soft tissues of the   proximal great toe, favored to be vascular structures but possibility of soft   tissue gas is not entirely excluded. 4. Small joint effusion at the 1st MTP joint. MRI FOOT RIGHT W WO CONTRAST   Final Result   1. No osteomyelitis or other acute osseous abnormality of the hindfoot. 2. Circumferential subcutaneous edema about the ankle and along the dorsum of   the midfoot compatible with bland edema versus cellulitis. No deep soft   tissue ulceration or drainable fluid collection. VL DUP LOWER EXTREMITY VENOUS RIGHT   Final Result      XR FOOT RIGHT (MIN 3 VIEWS)   Final Result   Soft tissue edema surrounding the 1st digit without evidence for radiopaque   foreign body or subcutaneous air. Cultures:   Wound culture, right hallux 12/31/2019: Group B Strep, Candida Albicans, S. Aureus    Assessment   Ivin Pair is a 38 y.o. female with   1.  Diabetic wound to the level of

## 2020-01-03 NOTE — FLOWSHEET NOTE
Assessment:  Patient is a 38 y.o. female who is in the hospital due to right foot pain and wound to her right foot. Patient was awake and alert. Patient was resting comfortably in bed. Patient easily entered into conversation. Intervention:   provided emotional and spiritual support.  offered prayer support for patient. Outcome:  Patient hopeful wound will heal.  Patient expressed gratitude to  for visit. Plan:  Chaplains will remain available for spiritual and emotional support as needed. 01/03/20 1034   Encounter Summary   Services provided to: Patient   Referral/Consult From: [facility];Family members   Contact Protestant No   Continue Visiting   (1/3/2020)   Complexity of Encounter Low   Length of Encounter 15 minutes   Spiritual Assessment Completed Yes   Routine   Type Initial   Assessment Calm; Approachable   Intervention Active listening;Prayer   Outcome Acceptance;Expressed gratitude

## 2020-01-03 NOTE — PROGRESS NOTES
frequency. Musculoskeletal: Negative for arthralgias, back pain and joint swelling. Skin: Negative for color change and rash. Neurological: Negative for dizziness, tremors, seizures and light-headedness. Psychiatric/Behavioral: Negative for agitation and confusion. Medications: Allergies:  No Known Allergies    Current Meds:   Scheduled Meds:    cefTRIAXone (ROCEPHIN) IV  2 g Intravenous Q24H    sodium chloride flush  10 mL Intravenous BID    carvedilol  12.5 mg Oral BID WC    sodium chloride flush  10 mL Intravenous 2 times per day    sodium chloride flush  10 mL Intravenous 2 times per day    insulin glargine  40 Units Subcutaneous Nightly    sodium chloride flush  10 mL Intravenous BID    aspirin  81 mg Oral Daily    levothyroxine  50 mcg Oral Daily    lisinopril-hydrochlorothiazide  1 tablet Oral Daily    metFORMIN  1,000 mg Oral BID WC    simvastatin  20 mg Oral Nightly    apixaban  2.5 mg Oral BID    insulin lispro  0-18 Units Subcutaneous TID WC    insulin lispro  0-9 Units Subcutaneous Nightly     Continuous Infusions:    sodium chloride 75 mL/hr at 01/03/20 0543    dextrose       PRN Meds: diphenhydrAMINE, sodium chloride flush, sodium chloride flush, potassium chloride **OR** potassium alternative oral replacement **OR** potassium chloride, magnesium sulfate, magnesium hydroxide, nicotine, HYDROmorphone, ibuprofen, ondansetron, acetaminophen, glucose, dextrose, glucagon (rDNA), dextrose    Data:     Past Medical History:   has a past medical history of Hyperlipidemia, Hypertension, Hypothyroidism, Type II or unspecified type diabetes mellitus without mention of complication, not stated as uncontrolled, and Urinary incontinence. Social History:   reports that she has quit smoking. She smoked 0.10 packs per day. She does not have any smokeless tobacco history on file. She reports that she does not drink alcohol or use drugs.      Family History:   Family History   Problem Relation Age of Onset    Stroke Father     Heart Disease Other        Vitals:  BP (!) 169/92   Pulse 85   Temp 97.9 °F (36.6 °C) (Oral)   Resp 17   Ht 5' 1\" (1.549 m)   Wt 273 lb 1.6 oz (123.9 kg)   SpO2 97%   BMI 51.60 kg/m²   Temp (24hrs), Av.1 °F (36.7 °C), Min:97.9 °F (36.6 °C), Max:98.3 °F (36.8 °C)    Recent Labs     20  1646 20  0817 20  1133   POCGLU 247* 267* 207* 193*       I/O (24Hr): Intake/Output Summary (Last 24 hours) at 1/3/2020 1507  Last data filed at 1/3/2020 0559  Gross per 24 hour   Intake 1764 ml   Output 2850 ml   Net -1086 ml       Labs:  Hematology:  Recent Labs     19  1843 20  0540   WBC  --  8.9   RBC  --  3.69*   HGB  --  11.0*   HCT  --  34.5*   MCV  --  93.5   MCH  --  29.8   MCHC  --  31.9   RDW  --  13.3   PLT  --  235   MPV  --  11.4   .5*  --      Chemistry:  Recent Labs     20  0540 20  0434 20  0529   *  --   --    K 3.8  --   --    CL 98  --   --    CO2 23  --   --    GLUCOSE 290*  --   --    BUN 14 19 18   CREATININE 1.07* 1.20* 1.26*   ANIONGAP 11  --   --    LABGLOM 55* 49* 46*   GFRAA >60 59* 56*   CALCIUM 7.9*  --   --        Physical Examination:        Physical Exam  Vitals signs reviewed. Constitutional:       General: She is not in acute distress. Appearance: Normal appearance. She is not ill-appearing or diaphoretic. HENT:      Head: Normocephalic and atraumatic. Nose: No congestion or rhinorrhea. Eyes:      Extraocular Movements: Extraocular movements intact. Conjunctiva/sclera: Conjunctivae normal.      Pupils: Pupils are equal, round, and reactive to light. Neck:      Musculoskeletal: Normal range of motion and neck supple. Cardiovascular:      Rate and Rhythm: Normal rate and regular rhythm. Pulses: Normal pulses. Heart sounds: Normal heart sounds. No murmur.    Pulmonary:      Effort: Pulmonary effort is normal.      Breath sounds: Normal

## 2020-01-03 NOTE — CARE COORDINATION
Transitional planning-talked with patient regarding IV antibiotics at the infusion center-Stated she can't get transportation to the infusion center. Talked with Dr. Sanket Marques stated that he could possibly put her on PO antibiotics. Dylon served Dr. Jimmie Andrew to see what his plan is.

## 2020-01-03 NOTE — PROGRESS NOTES
[facility] - Progress Note    Today's Date and Time: 1/3/2020 , 11:54 AM    Impression :   · Rt great toe diabetic infection  · Foot cellulitis  · S/P I&D per Podiatry 12-31  · DM II not controlled  · CAD s/p stenting x 2  · Non compliance    Recommendations:     · D/c Cefazolin 2gm IV q 8 . · Ceftriaxone 2 gm IV q 24 hr. Stop date 1-10-20  · (If patient is unable or unwilling to accept Ceftriaxone, the alternative would be high dose Cefadroxil 1 gm po QID for the same amount of time, with follow up visit as outpatient within 3-4 days post discharge)  · Continue elevation of the leg and foot with 3-4 pillows. · Kerlex and ACE wraps to the foot and ankle. · Diflucan 200 mg po daily    Medical Decision Making/Summary/Discussion:1/3/2020     ·   Infection Control Recommendations   · [place] Precautions    Antimicrobial Stewardship Recommendations     · Targeted therapy  · IV to oral conversion  · PK dosing    Coordination of Outpatient Care:   · Estimated Length of IV antimicrobials: TBD  · Patient will need Midline Catheter Insertion:  No  · Patient will need PICC line Insertion: No  · Patient will need: Home IV , [place],  SNF,  LTAC: No  · Patient will need outpatient wound care: TBD    Chief complaint/reason for consultation:   · Diabetic foot infection, uncontrolled DM      History of Present Illness:   Shantell Angel is a 38y.o.-year-old  female who was initially admitted on 12/30/2019. Patient seen at the request of Dr. Steve Rojas:    Pt is a 37 yo woman visiting from [place]. She noted some Rt great toe pain on 12-24. By 12-30 the pain had progressed and her foot was swollen and tender so she presented to the ED for further evaluation. Pt also has a history of CAD with multiple stents in place, PE and IDDM. She reports that she lost her insurance 6 months ago and has not been taking her Brilinta, Eliquis or Insulin as a result. She denies any chills, fevers, night sweats or malaise prior to presenting to the ED. In the ED she was found to have abscess at the base of the Rt great toe with erythema and fluctuance noted. The wound did not penetrate to the bone, no induration or crepitus noted. , .5    Pt was admitted, started on Zosyn and Vancomycin, and seen by podiatry, and I&D was performed with serosanguinous drainage noted. MRI's of the foot showed:  No osteomyelitis or other acute osseous abnormality of the hindfoot. 2. Circumferential subcutaneous edema about the ankle and along the dorsum of   the midfoot compatible with bland edema versus cellulitis.  No deep soft   tissue ulceration or drainable fluid collection. 1. No evidence of osteomyelitis in the right forefoot. 2. Focal confluent soft tissue edema at the plantar aspect of the proximal   great toe, possibly focal cellulitis or phlegmon.  No definite organized   fluid collection to indicate abscess at this time.  This area measures   approximately 1.9 x 0.8 x 2.1 cm.   3. A couple low signal foci are seen within the plantar soft tissues of the   proximal great toe, favored to be vascular structures but possibility of soft   tissue gas is not entirely excluded. 4. Small joint effusion at the 1st MTP joint. Wound cultures are showing Grp B strep and Candida. CURRENT EXAMINATION: 1/3/2020    Afebrile  VS stable    The patient seen and evaluated at bedside. Patient is feeling better with no new acute issues or concerns today other than foot pain. Patient does not have any fevers, chills, cough, shortness of breath, chest pains or palpitations    However she complains of residual intense pain on the dorsum at the junction with the toes. There is residual significant edema on exam. No fluctuance. No crepitus. She tolerates pressure on foot on exam and compression ACE wrap)  Initial Culture with Strep Grp B.     Needs additional organomegaly  All four Extremities: No cyanosis, clubbing, edema, or effusions. Neurologic: No gross sensory or motor deficits. Skin: Warm and dry with good turgor. No signs of peripheral arterial or venous insufficiency. Rt great toe wound. Medical Decision Making -Laboratory:   I have independently reviewed/ordered the following labs:    CBC with Differential:   Recent Labs     01/01/20  0540   WBC 8.9   HGB 11.0*   HCT 34.5*        BMP:   Recent Labs     01/01/20  0540 01/02/20  0434 01/03/20  0529   *  --   --    K 3.8  --   --    CL 98  --   --    CO2 23  --   --    BUN 14 19 18   CREATININE 1.07* 1.20* 1.26*     Hepatic Function Panel: No results for input(s): PROT, LABALBU, BILIDIR, IBILI, BILITOT, ALKPHOS, ALT, AST in the last 72 hours. No results for input(s): RPR in the last 72 hours. No results for input(s): HIV in the last 72 hours. No results for input(s): BC in the last 72 hours.   Lab Results   Component Value Date    MUCUS NOT REPORTED 03/20/2015    RBC 3.69 01/01/2020    TRICHOMONAS NOT REPORTED 03/20/2015    WBC 8.9 01/01/2020    YEAST NOT REPORTED 03/20/2015    TURBIDITY CLEAR 01/16/2016     Lab Results   Component Value Date    CREATININE 1.26 01/03/2020    GLUCOSE 290 01/01/2020       Medical Decision Making-Imaging:     EXAMINATION:   MRI OF THE RIGHT HINDFOOT WITHOUT AND WITH CONTRAST, 12/31/2019 4:22 pm       TECHNIQUE:   Multiplanar multisequence MRI of the right foot was performed without and   with the administration of intravenous contrast.       COMPARISON:   Right foot radiographs 12/30/2019.       HISTORY:   ORDERING SYSTEM PROVIDED HISTORY: right foot wound   TECHNOLOGIST PROVIDED HISTORY:   right foot wound   What is the area of interest?->Forefoot   Is the patient pregnant?->No       The patient reported to the MRI tech that she had complaints involving the   heel and midfoot so a hindfoot study was performed.  The clinician has   requested that the study be repeated as a forefoot.       FINDINGS:   PLANTAR FASCIA: Mild chronic thickening of the central cord of the plantar   fascia.  No acute plantar fasciitis or plantar fascia tear.  Severe fatty   atrophy of the abductor digiti minimi muscle.       ACHILLES TENDON: The Achilles tendon is normal in position, morphology and   signal.  No associated bursitis.       BONE MARROW: No fracture or dislocation.  No abnormal marrow space-occupying   lesion.  No osseous erosion.       JOINT SPACES: The tibiotalar and subtalar joints are normal in appearance. The talonavicular and calcaneocuboid joints are unremarkable.  The midfoot is   unremarkable.  No joint effusion.       SYNDESMOTIC LIGAMENTS: The anterior and posterior inferior tibiofibular   ligaments are intact.       LATERAL COLLATERAL LIGAMENT COMPLEX: The anterior talofibular ligament,   calcaneofibular ligament and posterior talofibular ligaments are without   acute abnormality.       DELTOID LIGAMENT COMPLEX:  The superficial and deep components of the deltoid   ligament complex are normal.       SINUS TARSI AND SPRING LIGAMENT: The sinus tarsi fat plug is grossly   preserved.  The spring ligament complex is intact.       MEDIAL TENDONS: The posterior tibialis, flexor digitorum longus and flexor   hallucis longus tendons are intact.       LATERAL TENDONS:  The peroneus longus and brevis tendons are intact.       ANTERIOR TENDONS: The tibialis anterior, extensor hallucis longus and   extensor digitorum longus tendons are normal in position, morphology and   signal.       TARSAL TUNNEL: There are no obstructing lesions within the tarsal tunnel.       MISCELLANEOUS: Circumferential subcutaneous edema about the ankle and along   the dorsum of the midfoot.  No soft tissue ulceration, sinus tract, or   drainable fluid collection.  No abnormal soft tissue mass.           Impression   1. No osteomyelitis or other acute osseous abnormality of the hindfoot.    2. edema at the plantar aspect of the proximal   great toe, possibly focal cellulitis or phlegmon.  No definite organized   fluid collection to indicate abscess at this time.  This area measures   approximately 1.9 x 0.8 x 2.1 cm.   3. A couple low signal foci are seen within the plantar soft tissues of the   proximal great toe, favored to be vascular structures but possibility of soft   tissue gas is not entirely excluded. 4. Small joint effusion at the 1st MTP joint. Medical Decision [facility]:   1/1/2020  6:17 PM - Sammy, Ruben Incoming Lab Results From [facility]     Specimen Information: Foot        Component Collected Lab   Specimen Description 12/31/2019 12:21  [address]   . FOOT RIGHT    Special Requests 12/31/2019 12:21  [address]   NOT REPORTED    Direct Exam 12/31/2019 12:21  [address]   NO NEUTROPHILS SEEN    Direct Exam Abnormal  12/31/2019 12:21 PM [address] [place] COCCI IN PAIRS    Direct Exam Abnormal  12/31/2019 12:21 PM [id number]    Culture Abnormal  12/31/2019 12:21 PM [address] MODERATE GROWTH    Culture Abnormal  12/31/2019 12:21 PM [address]    Culture 12/31/2019 12:21  [address]          Medical Decision Making-Other:     Note:  · Labs, medications, radiologic studies were reviewed with personal review of films  · Moderate Large amounts of data were reviewed  · Discussed with nursing Staff, Discharge planner  · Infection Control and Prevention measures reviewed  · All prior entries were reviewed  · Administer medications as ordered  · Prognosis: Fair  · Discharge planning reviewed  · Follow up as outpatient. Thank you for allowing us to participate in the care of this patient.  Please call No

## 2020-01-04 LAB
BUN BLDV-MCNC: 18 MG/DL (ref 6–20)
C-REACTIVE PROTEIN: 212.5 MG/L (ref 0–5)
CREAT SERPL-MCNC: 1.25 MG/DL (ref 0.5–0.9)
GFR AFRICAN AMERICAN: 56 ML/MIN
GFR NON-AFRICAN AMERICAN: 46 ML/MIN
GFR SERPL CREATININE-BSD FRML MDRD: ABNORMAL ML/MIN/{1.73_M2}
GFR SERPL CREATININE-BSD FRML MDRD: ABNORMAL ML/MIN/{1.73_M2}
GLUCOSE BLD-MCNC: 175 MG/DL (ref 65–105)
GLUCOSE BLD-MCNC: 206 MG/DL (ref 65–105)
GLUCOSE BLD-MCNC: 225 MG/DL (ref 65–105)
GLUCOSE BLD-MCNC: 247 MG/DL (ref 65–105)
SEDIMENTATION RATE, ERYTHROCYTE: >130 MM (ref 0–20)

## 2020-01-04 PROCEDURE — 6370000000 HC RX 637 (ALT 250 FOR IP): Performed by: NURSE PRACTITIONER

## 2020-01-04 PROCEDURE — 1200000000 HC SEMI PRIVATE

## 2020-01-04 PROCEDURE — 2580000003 HC RX 258: Performed by: INTERNAL MEDICINE

## 2020-01-04 PROCEDURE — 84520 ASSAY OF UREA NITROGEN: CPT

## 2020-01-04 PROCEDURE — 6370000000 HC RX 637 (ALT 250 FOR IP): Performed by: INTERNAL MEDICINE

## 2020-01-04 PROCEDURE — 86140 C-REACTIVE PROTEIN: CPT

## 2020-01-04 PROCEDURE — 85651 RBC SED RATE NONAUTOMATED: CPT

## 2020-01-04 PROCEDURE — 6360000002 HC RX W HCPCS: Performed by: FAMILY MEDICINE

## 2020-01-04 PROCEDURE — 82565 ASSAY OF CREATININE: CPT

## 2020-01-04 PROCEDURE — 2580000003 HC RX 258: Performed by: PHYSICIAN ASSISTANT

## 2020-01-04 PROCEDURE — 6370000000 HC RX 637 (ALT 250 FOR IP): Performed by: PHYSICIAN ASSISTANT

## 2020-01-04 PROCEDURE — 99232 SBSQ HOSP IP/OBS MODERATE 35: CPT | Performed by: INTERNAL MEDICINE

## 2020-01-04 PROCEDURE — 82947 ASSAY GLUCOSE BLOOD QUANT: CPT

## 2020-01-04 PROCEDURE — 6360000002 HC RX W HCPCS: Performed by: INTERNAL MEDICINE

## 2020-01-04 PROCEDURE — 99233 SBSQ HOSP IP/OBS HIGH 50: CPT | Performed by: INTERNAL MEDICINE

## 2020-01-04 PROCEDURE — 36415 COLL VENOUS BLD VENIPUNCTURE: CPT

## 2020-01-04 PROCEDURE — 2580000003 HC RX 258: Performed by: STUDENT IN AN ORGANIZED HEALTH CARE EDUCATION/TRAINING PROGRAM

## 2020-01-04 PROCEDURE — 2580000003 HC RX 258: Performed by: NURSE PRACTITIONER

## 2020-01-04 PROCEDURE — 6370000000 HC RX 637 (ALT 250 FOR IP): Performed by: FAMILY MEDICINE

## 2020-01-04 RX ORDER — HYDROCODONE BITARTRATE AND ACETAMINOPHEN 5; 325 MG/1; MG/1
1 TABLET ORAL EVERY 6 HOURS PRN
Status: DISCONTINUED | OUTPATIENT
Start: 2020-01-04 | End: 2020-01-04

## 2020-01-04 RX ORDER — HYDROCODONE BITARTRATE AND ACETAMINOPHEN 5; 325 MG/1; MG/1
1 TABLET ORAL ONCE
Status: COMPLETED | OUTPATIENT
Start: 2020-01-04 | End: 2020-01-04

## 2020-01-04 RX ORDER — HYDROCODONE BITARTRATE AND ACETAMINOPHEN 5; 325 MG/1; MG/1
2 TABLET ORAL EVERY 4 HOURS PRN
Status: DISCONTINUED | OUTPATIENT
Start: 2020-01-04 | End: 2020-01-05

## 2020-01-04 RX ADMIN — DIPHENHYDRAMINE HCL 25 MG: 25 TABLET ORAL at 09:21

## 2020-01-04 RX ADMIN — CEFTRIAXONE SODIUM 2 G: 2 INJECTION, POWDER, FOR SOLUTION INTRAMUSCULAR; INTRAVENOUS at 13:00

## 2020-01-04 RX ADMIN — LISINOPRIL AND HYDROCHLOROTHIAZIDE 1 TABLET: 25; 20 TABLET ORAL at 09:21

## 2020-01-04 RX ADMIN — INSULIN LISPRO 6 UNITS: 100 INJECTION, SOLUTION INTRAVENOUS; SUBCUTANEOUS at 12:11

## 2020-01-04 RX ADMIN — CARVEDILOL 12.5 MG: 12.5 TABLET, FILM COATED ORAL at 09:00

## 2020-01-04 RX ADMIN — SIMVASTATIN 20 MG: 20 TABLET, FILM COATED ORAL at 21:02

## 2020-01-04 RX ADMIN — DIPHENHYDRAMINE HCL 25 MG: 25 TABLET ORAL at 17:54

## 2020-01-04 RX ADMIN — HYDROMORPHONE HYDROCHLORIDE 0.5 MG: 1 INJECTION, SOLUTION INTRAMUSCULAR; INTRAVENOUS; SUBCUTANEOUS at 01:46

## 2020-01-04 RX ADMIN — APIXABAN 2.5 MG: 2.5 TABLET, FILM COATED ORAL at 09:22

## 2020-01-04 RX ADMIN — SODIUM CHLORIDE, PRESERVATIVE FREE 10 ML: 5 INJECTION INTRAVENOUS at 10:28

## 2020-01-04 RX ADMIN — ASPIRIN 81 MG 81 MG: 81 TABLET ORAL at 09:21

## 2020-01-04 RX ADMIN — CARVEDILOL 12.5 MG: 12.5 TABLET, FILM COATED ORAL at 17:54

## 2020-01-04 RX ADMIN — METFORMIN HYDROCHLORIDE 1000 MG: 500 TABLET ORAL at 09:00

## 2020-01-04 RX ADMIN — INSULIN LISPRO 6 UNITS: 100 INJECTION, SOLUTION INTRAVENOUS; SUBCUTANEOUS at 17:58

## 2020-01-04 RX ADMIN — INSULIN LISPRO 3 UNITS: 100 INJECTION, SOLUTION INTRAVENOUS; SUBCUTANEOUS at 09:00

## 2020-01-04 RX ADMIN — METFORMIN HYDROCHLORIDE 1000 MG: 500 TABLET ORAL at 17:54

## 2020-01-04 RX ADMIN — LEVOTHYROXINE SODIUM 50 MCG: 100 TABLET ORAL at 09:21

## 2020-01-04 RX ADMIN — INSULIN LISPRO 3 UNITS: 100 INJECTION, SOLUTION INTRAVENOUS; SUBCUTANEOUS at 22:18

## 2020-01-04 RX ADMIN — HYDROCODONE BITARTRATE AND ACETAMINOPHEN 1 TABLET: 5; 325 TABLET ORAL at 21:09

## 2020-01-04 RX ADMIN — INSULIN GLARGINE 40 UNITS: 100 INJECTION, SOLUTION SUBCUTANEOUS at 21:03

## 2020-01-04 RX ADMIN — HYDROCODONE BITARTRATE AND ACETAMINOPHEN 1 TABLET: 5; 325 TABLET ORAL at 17:54

## 2020-01-04 RX ADMIN — SODIUM CHLORIDE, PRESERVATIVE FREE 10 ML: 5 INJECTION INTRAVENOUS at 10:29

## 2020-01-04 RX ADMIN — HYDROCODONE BITARTRATE AND ACETAMINOPHEN 1 TABLET: 5; 325 TABLET ORAL at 09:24

## 2020-01-04 RX ADMIN — SODIUM CHLORIDE: 9 INJECTION, SOLUTION INTRAVENOUS at 22:17

## 2020-01-04 RX ADMIN — APIXABAN 2.5 MG: 2.5 TABLET, FILM COATED ORAL at 21:02

## 2020-01-04 ASSESSMENT — ENCOUNTER SYMPTOMS
CHEST TIGHTNESS: 0
EYE ITCHING: 0
BLOOD IN STOOL: 0
NAUSEA: 0
COLOR CHANGE: 0
COLOR CHANGE: 1
ABDOMINAL PAIN: 0
SHORTNESS OF BREATH: 0
VOMITING: 0
EYE DISCHARGE: 0
WHEEZING: 0
BACK PAIN: 0

## 2020-01-04 ASSESSMENT — PAIN DESCRIPTION - FREQUENCY: FREQUENCY: CONTINUOUS

## 2020-01-04 ASSESSMENT — PAIN SCALES - GENERAL
PAINLEVEL_OUTOF10: 0
PAINLEVEL_OUTOF10: 9
PAINLEVEL_OUTOF10: 10
PAINLEVEL_OUTOF10: 2
PAINLEVEL_OUTOF10: 9
PAINLEVEL_OUTOF10: 10

## 2020-01-04 ASSESSMENT — PAIN DESCRIPTION - PROGRESSION: CLINICAL_PROGRESSION: GRADUALLY WORSENING

## 2020-01-04 ASSESSMENT — PAIN DESCRIPTION - ORIENTATION: ORIENTATION: RIGHT

## 2020-01-04 ASSESSMENT — PAIN DESCRIPTION - ONSET: ONSET: ON-GOING

## 2020-01-04 ASSESSMENT — PAIN DESCRIPTION - LOCATION: LOCATION: FOOT

## 2020-01-04 NOTE — PROGRESS NOTES
[facility] - Progress Note    Today's Date and Time: 1/4/2020 , 3:24 PM    Impression :   · Rt great toe diabetic infection  · Right foot cellulitis  · S/P I&D per Podiatry 12-31  · DM II not controlled  · CAD s/p stenting x 2  · Non compliance    Recommendations:     · Treating the diabetic foot infection with p.o. Diflucan and  iv ceftriaxone,   · Foot cellulitis not better, might even be slightly spreading-  · Pus expressed by podiatry today from the bottom of the hallux  · Agree w podiatry about a I/D looking for deeper collection of pus, despite the MRI findings  · Elevate the foot, wound care  · Hold discharge    Medical Decision Making/Summary/Discussion:1/4/2020     ·   Infection Control Recommendations   · [place] Precautions    Antimicrobial Stewardship Recommendations     · Targeted therapy  · IV to oral conversion  · PK dosing    Coordination of Outpatient Care:   · Estimated Length of IV antimicrobials: TBD  · Patient will need Midline Catheter Insertion:  No  · Patient will need PICC line Insertion: No  · Patient will need: Home IV , [place],  SNF,  LTAC: No  · Patient will need outpatient wound care: TBD    Chief complaint/reason for consultation:   · Diabetic foot infection, uncontrolled DM      History of Present Illness:   Alo Armando is a 38y.o.-year-old  female who was initially admitted on 12/30/2019. Patient seen at the request of Dr. Victorina Juarez:    Pt is a 37 yo woman visiting from [place]. She noted some Rt great toe pain on 12-24. By 12-30 the pain had progressed and her foot was swollen and tender so she presented to the ED for further evaluation. Pt also has a history of CAD with multiple stents in place, PE and IDDM. She reports that she lost her insurance 6 months ago and has not been taking her Brilinta, Eliquis or Insulin as a result.      She denies any chills, fevers, night sweats or malaise prior to or unspecified type diabetes mellitus without mention of complication, not stated as uncontrolled     Urinary incontinence        Past Surgical  History:     Past Surgical History:   Procedure Laterality Date     SECTION      2 times    CHOLECYSTECTOMY      TOENAIL EXCISION         Medications:      cefTRIAXone (ROCEPHIN) IV  2 g Intravenous Q24H    sodium chloride flush  10 mL Intravenous BID    carvedilol  12.5 mg Oral BID WC    sodium chloride flush  10 mL Intravenous 2 times per day    sodium chloride flush  10 mL Intravenous 2 times per day    insulin glargine  40 Units Subcutaneous Nightly    sodium chloride flush  10 mL Intravenous BID    aspirin  81 mg Oral Daily    levothyroxine  50 mcg Oral Daily    lisinopril-hydrochlorothiazide  1 tablet Oral Daily    metFORMIN  1,000 mg Oral BID WC    simvastatin  20 mg Oral Nightly    apixaban  2.5 mg Oral BID    insulin lispro  0-18 Units Subcutaneous TID WC    insulin lispro  0-9 Units Subcutaneous Nightly       Social History:     Social History     Socioeconomic History    Marital status:      Spouse name: Not on file    Number of children: Not on file    Years of education: Not on file    Highest education level: Not on file   Occupational History    Not on file   Social Needs    Financial resource strain: Not on file    Food insecurity:     Worry: Not on file     Inability: Not on file    Transportation needs:     Medical: Not on file     Non-medical: Not on file   Tobacco Use    Smoking status: Former Smoker     Packs/day: 0.10   Substance and Sexual Activity    Alcohol use: No    Drug use: No    Sexual activity: Not on file   Lifestyle    Physical activity:     Days per week: Not on file     Minutes per session: Not on file    Stress: Not on file   Relationships    Social connections:     Talks on phone: Not on file     Gets together: Not on file     Attends [employer] service: Not on file     Active member of club or organization: Not on file     Attends meetings of clubs or organizations: Not on file     Relationship status: Not on file    Intimate partner violence:     Fear of current or ex partner: Not on file     Emotionally abused: Not on file     Physically abused: Not on file     Forced sexual activity: Not on file   Other Topics Concern    Not on file   Social History Narrative    Not on file       Family History:     Family History   Problem Relation Age of Onset    Stroke Father     Heart Disease Other         Allergies:   Patient has no known allergies. Review of Systems:   Review of Systems   Constitutional: Negative for activity change. HENT: Negative for congestion. Eyes: Negative for itching. Respiratory: Negative for chest tightness. Cardiovascular: Negative for chest pain. Gastrointestinal: Negative for abdominal pain. Endocrine: Negative for heat intolerance. Genitourinary: Negative for dysuria. Musculoskeletal: Negative for arthralgias. Skin: Positive for color change and wound. Allergic/Immunologic: Negative for immunocompromised state. Neurological: Negative for dizziness. Hematological: Negative for adenopathy. Psychiatric/Behavioral: Negative for agitation. Physical Examination :     Patient Vitals for the past 8 hrs:   BP Temp Temp src Pulse Resp SpO2   01/04/20 1229 (!) 142/72 98.3 °F (36.8 °C) Oral 90 17 99 %   01/04/20 0742 (!) 152/73 -- -- 87 17 --     Physical Exam  Constitutional:       Appearance: Normal appearance. HENT:      Head: Normocephalic. Nose: Nose normal. No congestion. Mouth/Throat:      Mouth: Mucous membranes are moist.      Pharynx: Oropharynx is clear. Eyes:      Conjunctiva/sclera: Conjunctivae normal.      Pupils: Pupils are equal, round, and reactive to light. Neck:      Musculoskeletal: Neck supple. No neck rigidity. Cardiovascular:      Rate and Rhythm: Normal rate and regular rhythm.       Heart sounds: Normal heart sounds. No murmur. Pulmonary:      Effort: No respiratory distress. Breath sounds: Normal breath sounds. Abdominal:      General: Bowel sounds are normal. There is no distension. Palpations: Abdomen is soft. Genitourinary:     Comments: No Moran  Musculoskeletal:         General: Tenderness present. No swelling. Skin:     General: Skin is dry. Coloration: Skin is not jaundiced. Findings: Erythema present. Neurological:      General: No focal deficit present. Mental Status: She is alert. Mental status is at baseline. Psychiatric:         Mood and Affect: Mood normal.         Thought Content: Thought content normal.          Medical Decision Making -Laboratory:   I have independently reviewed/ordered the following labs:    CBC with Differential:   No results for input(s): WBC, HGB, HCT, PLT, SEGSPCT, BANDSPCT, LYMPHOPCT, MONOPCT, EOSPCT in the last 72 hours. BMP:   Recent Labs     01/03/20  0529 01/04/20  0349   BUN 18 18   CREATININE 1.26* 1.25*     Hepatic Function Panel: No results for input(s): PROT, LABALBU, BILIDIR, IBILI, BILITOT, ALKPHOS, ALT, AST in the last 72 hours. No results for input(s): RPR in the last 72 hours. No results for input(s): HIV in the last 72 hours. No results for input(s): BC in the last 72 hours.   Lab Results   Component Value Date    MUCUS NOT REPORTED 03/20/2015    RBC 3.69 01/01/2020    TRICHOMONAS NOT REPORTED 03/20/2015    WBC 8.9 01/01/2020    YEAST NOT REPORTED 03/20/2015    TURBIDITY CLEAR 01/16/2016     Lab Results   Component Value Date    CREATININE 1.25 01/04/2020    GLUCOSE 290 01/01/2020       Medical Decision Making-Imaging:     EXAMINATION:   MRI OF THE RIGHT HINDFOOT WITHOUT AND WITH CONTRAST, 12/31/2019 4:22 pm       TECHNIQUE:   Multiplanar multisequence MRI of the right foot was performed without and   with the administration of intravenous contrast.       COMPARISON:   Right foot radiographs 12/30/2019.     obstructing lesions within the tarsal tunnel.       MISCELLANEOUS: Circumferential subcutaneous edema about the ankle and along   the dorsum of the midfoot.  No soft tissue ulceration, sinus tract, or   drainable fluid collection.  No abnormal soft tissue mass.           Impression   1. No osteomyelitis or other acute osseous abnormality of the hindfoot. 2. Circumferential subcutaneous edema about the ankle and along the dorsum of   the midfoot compatible with bland edema versus cellulitis.  No deep soft   tissue ulceration or drainable fluid collection.         EXAMINATION:   MRI OF THE RIGHT FOREFOOT WITHOUT AND WITH CONTRAST, 1/2/2020 10:00 am       TECHNIQUE:   Multiplanar multisequence MRI of the right forefoot was performed without and   with the administration of intravenous contrast.       COMPARISON:   12/31/2019 hindfoot MRI       HISTORY:   ORDERING SYSTEM PROVIDED HISTORY: Wound   TECHNOLOGIST PROVIDED HISTORY:   Wound   What is the area of interest?->Forefoot   Is the patient pregnant?->No       Wound at the plantar aspect of the great toe.       FINDINGS:   There is mild subcutaneous edema in the dorsal and plantar forefoot.  Focal   confluent soft tissue edema measuring approximately 1.9 x 0.8 x 2.1 cm is   seen in the soft tissues at the plantar aspect of the proximal great toe,   superficial to the flexor tendon, adjacent to the proximal phalanx.  There is   appears to be intervening fat on the T1 weighted sequences.  No definite   organized fluid collection identified in the soft tissues.  No abnormal   enhancement.  There are a couple low signal foci within the plantar soft   tissues of the great toe, somewhat linear appearance, suggesting a vascular   structure, but possibility of soft tissue gas is not excluded. Shena [employer] is a   small effusion at the 1st MTP joint.  No marrow signal abnormality.       There is a small amount of fluid in the 1st and 3rd intermetatarsal bursa.    No abnormal intermetatarsal enhancing mass.       There is mild increased signal within the intrinsic muscles of the foot. There is mild scattered fatty atrophy of the intrinsic muscles.       The visualized portions of the flexor and extensor tendons are intact.           Impression   1. No evidence of osteomyelitis in the right forefoot. 2. Focal confluent soft tissue edema at the plantar aspect of the proximal   great toe, possibly focal cellulitis or phlegmon.  No definite organized   fluid collection to indicate abscess at this time.  This area measures   approximately 1.9 x 0.8 x 2.1 cm.   3. A couple low signal foci are seen within the plantar soft tissues of the   proximal great toe, favored to be vascular structures but possibility of soft   tissue gas is not entirely excluded. 4. Small joint effusion at the 1st MTP joint. Medical Decision [facility]:   1/1/2020  6:17 PM - Ruben Christianson Incoming Lab Results From [employer]     Specimen Information: Foot        Component Collected Lab   Specimen Description 12/31/2019 12:21  [address]   . FOOT RIGHT    Special Requests 12/31/2019 12:21  [address]   NOT REPORTED    Direct Exam 12/31/2019 12:21  [address]   NO NEUTROPHILS SEEN    Direct Exam Abnormal  12/31/2019 12:21  [facility] [address] [facility] [place]    Direct Exam Abnormal  12/31/2019 12:21 PM [address]    Culture Abnormal  12/31/2019 12:21 PM [address] MODERATE GROWTH    Culture Abnormal  12/31/2019 12:21 PM [address]    Culture 12/31/2019 12:21 PM [address] Decision Making-Other:     Note:  · Labs, medications, radiologic studies were reviewed with personal review of films  · Moderate Large

## 2020-01-04 NOTE — PROGRESS NOTES
[facility]    Progress Note    1/4/2020    8:57 AM    Name:   Savage Pereyra  MRN:     [id number]     [place]:      [de-identified]   Room:   2026/2026-01  IP Day:  5  Admit Date:  12/30/2019  2:09 PM    PCP:   KAYLEE Cutler CNP  Code Status:  Full Code    Subjective:     C/C:   Chief Complaint   Patient presents with    Foot Pain     complaining of right foot pain to bottom of foot near crease of great toe and lateral right foot, she states that she started to notice swelling to dorsal aspect of foot with mild redness and pain    Leg Pain     complaining of right lower extremity pain to right medial aspect      Interval History Status: improved. No issues overnight   Redness and pain improving  No fever  Patent insist that she has no means of transportation to infusion center for IV antibiotic. ID considering PO antibiotic as a 2nd option. Brief History:   Patient is a 38 yr old female who presents to ED with c/p Right foot pain.  Right great toe noted to be deep red/pruple in color with swelling extending into dorsum of foot along with red/purple coloring.  Pulses are palpable, brisk cap refill, and warmth noted.  Ms. Berry Hardin denies any injury to the affected toe/foot, denies any fevers, chills, N/V/D andstates the symptoms have been ongoing for approx 24 hrs.  She has baseline neuropathy and denies any changes in N/T.  Presenting labs with elevated inflammatory markers along with significant Hyperglycemia, no gas on radiological imaging and doppler negative for DVT    Review of Systems:     Review of Systems   Constitutional: Negative for appetite change and fever. HENT: Negative for congestion and ear discharge. Eyes: Negative for discharge and visual disturbance. Respiratory: Negative for shortness of breath and wheezing. Cardiovascular: Negative for chest pain, palpitations and leg swelling.    Gastrointestinal: Negative for abdominal pain, blood in stool, nausea and vomiting. Endocrine: Negative for cold intolerance and heat intolerance. Genitourinary: Negative for dysuria and frequency. Musculoskeletal: Negative for arthralgias, back pain and joint swelling. Skin: Negative for color change and rash. Neurological: Negative for dizziness, tremors, seizures and light-headedness. Psychiatric/Behavioral: Negative for agitation and confusion. Medications: Allergies:  No Known Allergies    Current Meds:   Scheduled Meds:    cefTRIAXone (ROCEPHIN) IV  2 g Intravenous Q24H    sodium chloride flush  10 mL Intravenous BID    carvedilol  12.5 mg Oral BID WC    sodium chloride flush  10 mL Intravenous 2 times per day    sodium chloride flush  10 mL Intravenous 2 times per day    insulin glargine  40 Units Subcutaneous Nightly    sodium chloride flush  10 mL Intravenous BID    aspirin  81 mg Oral Daily    levothyroxine  50 mcg Oral Daily    lisinopril-hydrochlorothiazide  1 tablet Oral Daily    metFORMIN  1,000 mg Oral BID WC    simvastatin  20 mg Oral Nightly    apixaban  2.5 mg Oral BID    insulin lispro  0-18 Units Subcutaneous TID WC    insulin lispro  0-9 Units Subcutaneous Nightly     Continuous Infusions:    sodium chloride 75 mL/hr at 01/03/20 0543    dextrose       PRN Meds: diphenhydrAMINE, sodium chloride flush, sodium chloride flush, potassium chloride **OR** potassium alternative oral replacement **OR** potassium chloride, magnesium sulfate, magnesium hydroxide, nicotine, HYDROmorphone, ibuprofen, ondansetron, acetaminophen, glucose, dextrose, glucagon (rDNA), dextrose    Data:     Past Medical History:   has a past medical history of Hyperlipidemia, Hypertension, Hypothyroidism, Type II or unspecified type diabetes mellitus without mention of complication, not stated as uncontrolled, and Urinary incontinence. Social History:   reports that she has quit smoking.  She smoked 0.10 packs per Tenderness: There is no tenderness. Musculoskeletal: Normal range of motion. General: No swelling or tenderness. Skin:     General: Skin is warm and dry. Findings: No rash. Neurological:      General: No focal deficit present. Mental Status: She is alert and oriented to person, place, and time. Psychiatric:         Mood and Affect: Mood normal.         Thought Content: Thought content normal.         Judgment: Judgment normal.               Assessment:        Hospital Problems           Last Modified POA    * (Principal) Cellulitis of right foot 1/2/2020 Yes    Hypertension 12/31/2019 Yes    Morbid obesity due to excess calories ([address] 12/31/2019 Yes    Essential hypertension 12/31/2019 Yes    Hypothyroidism 12/31/2019 Yes    Diabetic polyneuropathy ([address] 12/31/2019 Yes    Type 2 diabetes mellitus with neurologic complication, with long-term current use of insulin ([address] 12/31/2019 Yes    Chronic diastolic congestive heart failure ([address] 12/31/2019 Yes    CAROLYN (acute kidney injury) ([address] 1/2/2020 Yes          Plan:      1. Left foot cellulitis. Continue Ceftriaxone 2 gm daily. Stop date 1/10/20. However patient is unable to go to infusion center for daily IV antibiotic. ID consider PO antibiotic on discharge. Will continue IV antibiotic here. 2. CAROLYN. Worsening. Continue IVF. Avoid nephrotoxic agents. Repeat BMP in the AM  3. Chronic diastolic CHF. No acute exacerbation. Continue Coreg and Lisinopril  4. Morbid obesity. counseled    5.  Discharge planning - ongoing pending outpatient arrangement for long term antibiotic.       Julianna Craig MD  1/4/2020  8:57 AM

## 2020-01-04 NOTE — PROGRESS NOTES
Progress Note  Podiatric Medicine and Surgery     Subjective     CC: Right foot pain    Patient seen and examined at bedside  Hypertension, other vital signs stable  Patient reported vomiting last night but states this resolved when they switched her to oral pain medicine  Patient reports pain to right foot controlled with pain medicine  Patient denies nausea, fever, chills, shortness of breath, chest pain    HPI :    Candi Terrell is a 38 y.o. female with a past medical history of DM2 with neuropathy was seen at [facility] for right foot pain and wound. Pt states that about 1 week ago her right great toe became very painful and this morning she noted worsening pain and new redness of her right great toe. Pt denies any trauma or stepping on anything sharp. She notes her redness progressively has moved up towards her ankle. Patient is originally from [place] and has moved here, she currently does not have her insulin. Pt is also complaining of calf pain. She has a history of a DVT and PE. Pt denies any N/V/F/C/CP/SOB.     PCP is KAYLEE Murray CNP    ROS: Denies N/V/F/C/SOB/CP. Otherwise negative except at stated in the HPI.      Medications:  Scheduled Meds:   cefTRIAXone (ROCEPHIN) IV  2 g Intravenous Q24H    sodium chloride flush  10 mL Intravenous BID    carvedilol  12.5 mg Oral BID     sodium chloride flush  10 mL Intravenous 2 times per day    sodium chloride flush  10 mL Intravenous 2 times per day    insulin glargine  40 Units Subcutaneous Nightly    sodium chloride flush  10 mL Intravenous BID    aspirin  81 mg Oral Daily    levothyroxine  50 mcg Oral Daily    lisinopril-hydrochlorothiazide  1 tablet Oral Daily    metFORMIN  1,000 mg Oral BID     simvastatin  20 mg Oral Nightly    apixaban  2.5 mg Oral BID    insulin lispro  0-18 Units Subcutaneous TID     insulin lispro  0-9 Units Subcutaneous Nightly       Continuous Infusions:   sodium chloride 75 mL/hr at 01/03/20 0530 ulcer #1 located plantar sulcus of right hallux and measures approximately 0.1 cm x 0.1 cm x 0.5 cm. Base is granular. Periwound skin is hyperkeratotic. Scant purulent drainage noted from the wound. Erythema on dorsum of foot unchanged from previous physical exam. No fluctuance noted. Does not probe to bone, sinus track, or undermine. No fluctuance, crepitus, or induration. Interdigital maceration absent.      Left foot is warm and dry with no open wounds       Clinical Images:            Imaging:   MRI FOOT RIGHT W WO CONTRAST   Final Result   1. No evidence of osteomyelitis in the right forefoot. 2. Focal confluent soft tissue edema at the plantar aspect of the proximal   great toe, possibly focal cellulitis or phlegmon. No definite organized   fluid collection to indicate abscess at this time. This area measures   approximately 1.9 x 0.8 x 2.1 cm.   3. A couple low signal foci are seen within the plantar soft tissues of the   proximal great toe, favored to be vascular structures but possibility of soft   tissue gas is not entirely excluded. 4. Small joint effusion at the 1st MTP joint. MRI FOOT RIGHT W WO CONTRAST   Final Result   1. No osteomyelitis or other acute osseous abnormality of the hindfoot. 2. Circumferential subcutaneous edema about the ankle and along the dorsum of   the midfoot compatible with bland edema versus cellulitis. No deep soft   tissue ulceration or drainable fluid collection. VL DUP LOWER EXTREMITY VENOUS RIGHT   Final Result      XR FOOT RIGHT (MIN 3 VIEWS)   Final Result   Soft tissue edema surrounding the 1st digit without evidence for radiopaque   foreign body or subcutaneous air. Cultures:   Wound culture, right hallux 12/31/2019: Group B Strep, Candida Albicans, S. Aureus    Assessment   Rosendo Maldonado is a 38 y.o. female with   1. Diabetic wound to the level of subcutaneous tissue, right hallux  2. Cellulitis, right foot  3.  DM2 with neuropathy  4. History of DVT  5. Chronic CHF    Principal Problem:    Cellulitis of right foot  Active Problems:    Hypertension    Morbid obesity due to excess calories ([facility])    Essential hypertension    Hypothyroidism    Diabetic polyneuropathy (HCC)    Type 2 diabetes mellitus with neurologic complication, with long-term current use of insulin ([facility])    Chronic diastolic congestive heart failure (HCC)    CAROLYN (acute kidney injury) ([facility]  Resolved Problems:    Foot infection    Hyperglycemia    Non compliance w medication regimen    H/O deep venous thrombosis       Plan     · Patient examined and evaluated at bedside   · Treatment options discussed in detail with the patient  · Xrays neg for OM or gas  · MRI of hindfoot: neg for deep abscess in ankle or dorsum or right foot  · MRI of forefoot: neg for OM; soft tissue edema at plantar aspect of proximal great toe, no definite fluid collection-low suspicion for abscess  · Venous duplex negative for DVT  · Medical management by primary team. Appreciate recommendations  · ID following-cefazolin 2 g IV every 8 hours, Diflucan 200 mg p.o. daily  · Podiatry recommends not discharging until evaluation tomorrow due to purulent drainage from foot wound. Will check for deeper abscess in the AM.  · Patient is visiting from [place]. Spoke with her about importance of following up with podiatrist when she gets back to [place]. Explained signs of infection also and to present to ED when she gets back to [place] if she hasn't been able to follow-up yet with podiatrist.  · Continue daily dressing changes to right foot: betadine to right hallux wound, DSD  · NWB to Right lower extremity in surgical shoe. · Discussed with Dr. Gio Finley DPM   Podiatric Medicine & Surgery   1/4/2020 at 4:18 PM    I performed a history and physical examination of the patient and discussed management with the resident.  I reviewed the residents note and agree with the documented findings and plan of care. Any areas of disagreement are noted on the chart. I was personally present for the key portions of any procedures. I have documented in the chart those procedures where I was not present during the key portions. I have reviewed the Podiatry Resident progress note. I agree with the chief complaint, past medical history, past surgical history, allergies, medications, social and family history as documented unless otherwise noted below. Documentation of the HPI, Physical Exam and Medical Decision Making performed by medical students or scribes is based on my personal performance of the HPI, PE and MDM. I have personally evaluated this patient and have completed at least one if not all key elements of the E/M (history, physical exam, and MDM). Additional findings are as noted.      Levy Zabala DPM on 1/6/2020 at 9:02 AM  Board Certified, American Board of Podiatric Surgery  Fellow, [address] [place] [address]

## 2020-01-04 NOTE — PLAN OF CARE
Problem: Falls - Risk of:  Goal: Will remain free from falls  Description  Will remain free from falls  Outcome: Ongoing  Goal: Absence of physical injury  Description  Absence of physical injury  Outcome: Ongoing     Problem: Skin Integrity:  Goal: Demonstration of wound healing without infection will improve  Description  Demonstration of wound healing without infection will improve  Outcome: Ongoing  Goal: Complications related to intravenous access or infusion will be avoided or minimized  Description  Complications related to intravenous access or infusion will be avoided or minimized  Outcome: Ongoing  Goal: Will show no infection signs and symptoms  Description  Will show no infection signs and symptoms  Outcome: Ongoing  Goal: Absence of new skin breakdown  Description  Absence of new skin breakdown  Outcome: Ongoing     Problem: Pain:  Description  Pain management should include both nonpharmacologic and pharmacologic interventions. Goal: Pain level will decrease  Description  Pain level will decrease  Outcome: Ongoing  Goal: Control of acute pain  Description  Control of acute pain  Outcome: Ongoing  Goal: Control of chronic pain  Description  Control of chronic pain  Outcome: Ongoing     Problem: Discharge Planning:  Goal: Discharged to appropriate level of care  Description  Discharged to appropriate level of care  Outcome: Ongoing     Problem: Serum Glucose Level - Abnormal:  Goal: Ability to maintain appropriate glucose levels will improve  Description  Ability to maintain appropriate glucose levels will improve  Outcome: Ongoing     Problem: Sensory Perception - Impaired:  Goal: Ability to maintain a stable neurologic state will improve  Description  Ability to maintain a stable neurologic state will improve  Outcome: Ongoing     Problem:  Body Temperature - Imbalanced:  Goal: Ability to maintain a body temperature in the normal range will improve  Description  Ability to maintain a body temperature in the normal range will improve  Outcome: Ongoing     Problem: Mobility - Impaired:  Goal: Mobility will improve to maximum level  Description  Mobility will improve to maximum level  Outcome: Ongoing     Problem: Pain:  Description  Pain management should include both nonpharmacologic and pharmacologic interventions.   Goal: Control of acute pain  Description  Control of acute pain  Outcome: Ongoing  Goal: Control of chronic pain  Description  Control of chronic pain  Outcome: Ongoing     Problem: Skin Integrity - Impaired:  Goal: Will show no infection signs and symptoms  Description  Will show no infection signs and symptoms  Outcome: Ongoing  Goal: Absence of new skin breakdown  Description  Absence of new skin breakdown  Outcome: Ongoing

## 2020-01-05 LAB
ABSOLUTE EOS #: 0.13 K/UL (ref 0–0.44)
ABSOLUTE IMMATURE GRANULOCYTE: 0.04 K/UL (ref 0–0.3)
ABSOLUTE LYMPH #: 1.25 K/UL (ref 1.1–3.7)
ABSOLUTE MONO #: 0.92 K/UL (ref 0.1–1.2)
BASOPHILS # BLD: 0 % (ref 0–2)
BASOPHILS ABSOLUTE: 0.04 K/UL (ref 0–0.2)
BUN BLDV-MCNC: 19 MG/DL (ref 6–20)
C-REACTIVE PROTEIN: 262.9 MG/L (ref 0–5)
CREAT SERPL-MCNC: 1.28 MG/DL (ref 0.5–0.9)
DIFFERENTIAL TYPE: ABNORMAL
EOSINOPHILS RELATIVE PERCENT: 1 % (ref 1–4)
GFR AFRICAN AMERICAN: 55 ML/MIN
GFR NON-AFRICAN AMERICAN: 45 ML/MIN
GFR SERPL CREATININE-BSD FRML MDRD: ABNORMAL ML/MIN/{1.73_M2}
GFR SERPL CREATININE-BSD FRML MDRD: ABNORMAL ML/MIN/{1.73_M2}
GLUCOSE BLD-MCNC: 160 MG/DL (ref 65–105)
GLUCOSE BLD-MCNC: 208 MG/DL (ref 65–105)
GLUCOSE BLD-MCNC: 230 MG/DL (ref 65–105)
HCT VFR BLD CALC: 36.8 % (ref 36.3–47.1)
HEMOGLOBIN: 11.6 G/DL (ref 11.9–15.1)
IMMATURE GRANULOCYTES: 0 %
LYMPHOCYTES # BLD: 11 % (ref 24–43)
MCH RBC QN AUTO: 29.6 PG (ref 25.2–33.5)
MCHC RBC AUTO-ENTMCNC: 31.5 G/DL (ref 28.4–34.8)
MCV RBC AUTO: 93.9 FL (ref 82.6–102.9)
MONOCYTES # BLD: 8 % (ref 3–12)
NRBC AUTOMATED: 0 PER 100 WBC
PDW BLD-RTO: 13.2 % (ref 11.8–14.4)
PLATELET # BLD: 335 K/UL (ref 138–453)
PLATELET ESTIMATE: ABNORMAL
PMV BLD AUTO: 10.7 FL (ref 8.1–13.5)
RBC # BLD: 3.92 M/UL (ref 3.95–5.11)
RBC # BLD: ABNORMAL 10*6/UL
SEG NEUTROPHILS: 80 % (ref 36–65)
SEGMENTED NEUTROPHILS ABSOLUTE COUNT: 8.89 K/UL (ref 1.5–8.1)
WBC # BLD: 11.3 K/UL (ref 3.5–11.3)
WBC # BLD: ABNORMAL 10*3/UL

## 2020-01-05 PROCEDURE — 1200000000 HC SEMI PRIVATE

## 2020-01-05 PROCEDURE — 6370000000 HC RX 637 (ALT 250 FOR IP): Performed by: NURSE PRACTITIONER

## 2020-01-05 PROCEDURE — 2580000003 HC RX 258: Performed by: INTERNAL MEDICINE

## 2020-01-05 PROCEDURE — 36415 COLL VENOUS BLD VENIPUNCTURE: CPT

## 2020-01-05 PROCEDURE — 82565 ASSAY OF CREATININE: CPT

## 2020-01-05 PROCEDURE — 6360000002 HC RX W HCPCS: Performed by: INTERNAL MEDICINE

## 2020-01-05 PROCEDURE — 85025 COMPLETE CBC W/AUTO DIFF WBC: CPT

## 2020-01-05 PROCEDURE — 84520 ASSAY OF UREA NITROGEN: CPT

## 2020-01-05 PROCEDURE — 6370000000 HC RX 637 (ALT 250 FOR IP): Performed by: PHYSICIAN ASSISTANT

## 2020-01-05 PROCEDURE — 2580000003 HC RX 258: Performed by: STUDENT IN AN ORGANIZED HEALTH CARE EDUCATION/TRAINING PROGRAM

## 2020-01-05 PROCEDURE — 6370000000 HC RX 637 (ALT 250 FOR IP): Performed by: INTERNAL MEDICINE

## 2020-01-05 PROCEDURE — 6370000000 HC RX 637 (ALT 250 FOR IP): Performed by: FAMILY MEDICINE

## 2020-01-05 PROCEDURE — 82947 ASSAY GLUCOSE BLOOD QUANT: CPT

## 2020-01-05 PROCEDURE — 99233 SBSQ HOSP IP/OBS HIGH 50: CPT | Performed by: INTERNAL MEDICINE

## 2020-01-05 PROCEDURE — 2580000003 HC RX 258: Performed by: NURSE PRACTITIONER

## 2020-01-05 PROCEDURE — 86140 C-REACTIVE PROTEIN: CPT

## 2020-01-05 RX ORDER — LIDOCAINE HYDROCHLORIDE 10 MG/ML
10 INJECTION, SOLUTION EPIDURAL; INFILTRATION; INTRACAUDAL; PERINEURAL ONCE
Status: COMPLETED | OUTPATIENT
Start: 2020-01-05 | End: 2020-01-05

## 2020-01-05 RX ORDER — HYDROCODONE BITARTRATE AND ACETAMINOPHEN 5; 325 MG/1; MG/1
2 TABLET ORAL
Status: DISCONTINUED | OUTPATIENT
Start: 2020-01-05 | End: 2020-01-12 | Stop reason: HOSPADM

## 2020-01-05 RX ADMIN — CARVEDILOL 12.5 MG: 12.5 TABLET, FILM COATED ORAL at 07:56

## 2020-01-05 RX ADMIN — SODIUM CHLORIDE: 9 INJECTION, SOLUTION INTRAVENOUS at 11:56

## 2020-01-05 RX ADMIN — SODIUM CHLORIDE, PRESERVATIVE FREE 10 ML: 5 INJECTION INTRAVENOUS at 20:49

## 2020-01-05 RX ADMIN — LEVOTHYROXINE SODIUM 50 MCG: 100 TABLET ORAL at 07:56

## 2020-01-05 RX ADMIN — METFORMIN HYDROCHLORIDE 1000 MG: 500 TABLET ORAL at 17:16

## 2020-01-05 RX ADMIN — APIXABAN 2.5 MG: 2.5 TABLET, FILM COATED ORAL at 07:56

## 2020-01-05 RX ADMIN — INSULIN LISPRO 6 UNITS: 100 INJECTION, SOLUTION INTRAVENOUS; SUBCUTANEOUS at 17:15

## 2020-01-05 RX ADMIN — INSULIN LISPRO 3 UNITS: 100 INJECTION, SOLUTION INTRAVENOUS; SUBCUTANEOUS at 07:59

## 2020-01-05 RX ADMIN — INSULIN LISPRO 3 UNITS: 100 INJECTION, SOLUTION INTRAVENOUS; SUBCUTANEOUS at 20:48

## 2020-01-05 RX ADMIN — ASPIRIN 81 MG 81 MG: 81 TABLET ORAL at 07:56

## 2020-01-05 RX ADMIN — HYDROCODONE BITARTRATE AND ACETAMINOPHEN 2 TABLET: 5; 325 TABLET ORAL at 12:09

## 2020-01-05 RX ADMIN — SIMVASTATIN 20 MG: 20 TABLET, FILM COATED ORAL at 20:49

## 2020-01-05 RX ADMIN — APIXABAN 2.5 MG: 2.5 TABLET, FILM COATED ORAL at 20:49

## 2020-01-05 RX ADMIN — HYDROCODONE BITARTRATE AND ACETAMINOPHEN 2 TABLET: 5; 325 TABLET ORAL at 15:54

## 2020-01-05 RX ADMIN — LIDOCAINE HYDROCHLORIDE 10 ML: 10 INJECTION, SOLUTION EPIDURAL; INFILTRATION; INTRACAUDAL; PERINEURAL at 09:46

## 2020-01-05 RX ADMIN — LISINOPRIL AND HYDROCHLOROTHIAZIDE 1 TABLET: 25; 20 TABLET ORAL at 07:55

## 2020-01-05 RX ADMIN — HYDROCODONE BITARTRATE AND ACETAMINOPHEN 2 TABLET: 5; 325 TABLET ORAL at 19:01

## 2020-01-05 RX ADMIN — HYDROCODONE BITARTRATE AND ACETAMINOPHEN 2 TABLET: 5; 325 TABLET ORAL at 07:54

## 2020-01-05 RX ADMIN — HYDROCODONE BITARTRATE AND ACETAMINOPHEN 2 TABLET: 5; 325 TABLET ORAL at 02:00

## 2020-01-05 RX ADMIN — INSULIN GLARGINE 40 UNITS: 100 INJECTION, SOLUTION SUBCUTANEOUS at 20:48

## 2020-01-05 RX ADMIN — CEFTRIAXONE SODIUM 2 G: 2 INJECTION, POWDER, FOR SOLUTION INTRAMUSCULAR; INTRAVENOUS at 13:04

## 2020-01-05 RX ADMIN — CARVEDILOL 12.5 MG: 12.5 TABLET, FILM COATED ORAL at 17:14

## 2020-01-05 RX ADMIN — METFORMIN HYDROCHLORIDE 1000 MG: 500 TABLET ORAL at 07:56

## 2020-01-05 RX ADMIN — HYDROCODONE BITARTRATE AND ACETAMINOPHEN 2 TABLET: 5; 325 TABLET ORAL at 22:34

## 2020-01-05 ASSESSMENT — PAIN SCALES - GENERAL
PAINLEVEL_OUTOF10: 7
PAINLEVEL_OUTOF10: 8
PAINLEVEL_OUTOF10: 9
PAINLEVEL_OUTOF10: 10
PAINLEVEL_OUTOF10: 9
PAINLEVEL_OUTOF10: 5
PAINLEVEL_OUTOF10: 9

## 2020-01-05 ASSESSMENT — ENCOUNTER SYMPTOMS
EYE DISCHARGE: 0
BLOOD IN STOOL: 0
SHORTNESS OF BREATH: 0
ABDOMINAL PAIN: 0
VOMITING: 0
BACK PAIN: 0
WHEEZING: 0
NAUSEA: 0
COLOR CHANGE: 0

## 2020-01-05 NOTE — PROGRESS NOTES
on dorsum of foot unchanged from previous physical exam. No fluctuance noted. Wound does probe to bone but does not sinus track or undermine. No fluctuance, crepitus, or induration. Interdigital maceration absent.      Left foot is warm and dry with no open wounds       Clinical Images from 1/4/20: Unchanged today                      Imaging:   MRI FOOT RIGHT W WO CONTRAST   Final Result   1. No evidence of osteomyelitis in the right forefoot. 2. Focal confluent soft tissue edema at the plantar aspect of the proximal   great toe, possibly focal cellulitis or phlegmon. No definite organized   fluid collection to indicate abscess at this time. This area measures   approximately 1.9 x 0.8 x 2.1 cm.   3. A couple low signal foci are seen within the plantar soft tissues of the   proximal great toe, favored to be vascular structures but possibility of soft   tissue gas is not entirely excluded. 4. Small joint effusion at the 1st MTP joint. MRI FOOT RIGHT W WO CONTRAST   Final Result   1. No osteomyelitis or other acute osseous abnormality of the hindfoot. 2. Circumferential subcutaneous edema about the ankle and along the dorsum of   the midfoot compatible with bland edema versus cellulitis. No deep soft   tissue ulceration or drainable fluid collection. VL DUP LOWER EXTREMITY VENOUS RIGHT   Final Result      XR FOOT RIGHT (MIN 3 VIEWS)   Final Result   Soft tissue edema surrounding the 1st digit without evidence for radiopaque   foreign body or subcutaneous air. Cultures:   Wound culture, right hallux 12/31/2019: Group B Strep, Candida Albicans, S. Aureus    Wound culture, right hallux: 1/5/20: Pending    Assessment   Ramu Aguirre is a 38 y.o. female with   1. Diabetic wound to the level of subcutaneous tissue, right hallux  2. Cellulitis, right foot  3. DM2 with neuropathy  4. History of DVT  5.  Chronic CHF    Principal Problem:    Cellulitis of right foot  Active Problems: purulent drainage was drained. Area was then flushed thoroughly with copious amounts of sterile saline. Site was packed with 1/4\" packing, then covered with DSD and ACE wrap. Santos Hines DPM   Podiatric Medicine & Surgery   1/5/2020 at 6:49 AM       I performed a history and physical examination of the patient and discussed management with the resident. I reviewed the residents note and agree with the documented findings and plan of care. Any areas of disagreement are noted on the chart. I was personally present for the key portions of any procedures. I have documented in the chart those procedures where I was not present during the key portions. I have reviewed the Podiatry Resident progress note. I agree with the chief complaint, past medical history, past surgical history, allergies, medications, social and family history as documented unless otherwise noted below. Documentation of the HPI, Physical Exam and Medical Decision Making performed by medical students or scribes is based on my personal performance of the HPI, PE and MDM. I have personally evaluated this patient and have completed at least one if not all key elements of the E/M (history, physical exam, and MDM). Additional findings are as noted.      Domonique Melendrez DPM on 1/6/2020 at 6:99 AM  Board Certified, American Board of Podiatric Surgery  Fellow, [employer] [facility] and [facility]

## 2020-01-05 NOTE — PROGRESS NOTES
[facility] [place] 18    Progress Note    1/5/2020    12:06 PM    Name:   Kaila Loja  MRN:     [id number]     [place]:      [de-identified]   Room:   2026/2026-01  IP Day:  6  Admit Date:  12/30/2019  2:09 PM    PCP:   KAYLEE Best CNP  Code Status:  Full Code    Subjective:     C/C:   Chief Complaint   Patient presents with    Foot Pain     complaining of right foot pain to bottom of foot near crease of great toe and lateral right foot, she states that she started to notice swelling to dorsal aspect of foot with mild redness and pain    Leg Pain     complaining of right lower extremity pain to right medial aspect      Interval History Status: not changed. Overnight issues noted  Pod performed I and D this morning due to expression of pus during exam  Patient complaining of worsening pain since I and D  Renal functional worsening     Brief History:   Patient is a 38 yr old female who presents to ED with c/p Right foot pain.  Right great toe noted to be deep red/pruple in color with swelling extending into dorsum of foot along with red/purple coloring.  Pulses are palpable, brisk cap refill, and warmth noted.  Ms. Zoey Bustos denies any injury to the affected toe/foot, denies any fevers, chills, N/V/D andstates the symptoms have been ongoing for approx 24 hrs.  She has baseline neuropathy and denies any changes in N/T.  Presenting labs with elevated inflammatory markers along with significant Hyperglycemia, no gas on radiological imaging and doppler negative for DVT    Review of Systems:     Review of Systems   Constitutional: Negative for appetite change and fever. HENT: Negative for congestion and ear discharge. Eyes: Negative for discharge and visual disturbance. Respiratory: Negative for shortness of breath and wheezing. Cardiovascular: Negative for chest pain, palpitations and leg swelling.    Gastrointestinal: Negative for abdominal pain, blood in stool, nausea and vomiting. Endocrine: Negative for cold intolerance and heat intolerance. Genitourinary: Negative for dysuria and frequency. Musculoskeletal: Negative for arthralgias, back pain and joint swelling. Skin: Negative for color change and rash. Neurological: Negative for dizziness, tremors, seizures and light-headedness. Psychiatric/Behavioral: Negative for agitation and confusion. Medications: Allergies:  No Known Allergies    Current Meds:   Scheduled Meds:    cefTRIAXone (ROCEPHIN) IV  2 g Intravenous Q24H    sodium chloride flush  10 mL Intravenous BID    carvedilol  12.5 mg Oral BID WC    insulin glargine  40 Units Subcutaneous Nightly    aspirin  81 mg Oral Daily    levothyroxine  50 mcg Oral Daily    lisinopril-hydrochlorothiazide  1 tablet Oral Daily    metFORMIN  1,000 mg Oral BID WC    simvastatin  20 mg Oral Nightly    apixaban  2.5 mg Oral BID    insulin lispro  0-18 Units Subcutaneous TID WC    insulin lispro  0-9 Units Subcutaneous Nightly     Continuous Infusions:    sodium chloride 75 mL/hr at 01/05/20 1156    dextrose       PRN Meds: HYDROcodone 5 mg - acetaminophen, diphenhydrAMINE, sodium chloride flush, sodium chloride flush, potassium chloride **OR** potassium alternative oral replacement **OR** potassium chloride, magnesium sulfate, magnesium hydroxide, nicotine, ondansetron, acetaminophen, glucose, dextrose, glucagon (rDNA), dextrose    Data:     Past Medical History:   has a past medical history of Hyperlipidemia, Hypertension, Hypothyroidism, Type II or unspecified type diabetes mellitus without mention of complication, not stated as uncontrolled, and Urinary incontinence. Social History:   reports that she has quit smoking. She smoked 0.10 packs per day. She does not have any smokeless tobacco history on file. She reports that she does not drink alcohol or use drugs.      Family History:   Family History   Problem Relation Age of Onset    Stroke Father     Heart Disease Other        Vitals:  BP (!) 174/87   Pulse 86   Temp 98.4 °F (36.9 °C) (Oral)   Resp 20   Ht 5' 1\" (1.549 m)   Wt 279 lb 12.8 oz (126.9 kg)   SpO2 99%   BMI 52.87 kg/m²   Temp (24hrs), Av.2 °F (36.8 °C), Min:98 °F (36.7 °C), Max:98.4 °F (36.9 °C)    Recent Labs     20  1051 20  1614 20  2203 20  0753   POCGLU 225* 206* 247* 160*       I/O (24Hr): Intake/Output Summary (Last 24 hours) at 2020 1206  Last data filed at 2020 0500  Gross per 24 hour   Intake --   Output 1800 ml   Net -1800 ml       Labs:  Hematology:  Recent Labs     20  1728   SEDRATE >130*   .5*     Chemistry:  Recent Labs     20  0529 20  0349 20  0501   BUN 18 18 19   CREATININE 1.26* 1.25* 1.28*   LABGLOM 46* 46* 45*   GFRAA 56* 56* 55*         Physical Examination:        Physical Exam  Vitals signs reviewed. Constitutional:       General: She is not in acute distress. Appearance: Normal appearance. She is not ill-appearing or diaphoretic. HENT:      Head: Normocephalic and atraumatic. Nose: No congestion or rhinorrhea. Eyes:      Extraocular Movements: Extraocular movements intact. Conjunctiva/sclera: Conjunctivae normal.      Pupils: Pupils are equal, round, and reactive to light. Neck:      Musculoskeletal: Normal range of motion and neck supple. Cardiovascular:      Rate and Rhythm: Normal rate and regular rhythm. Pulses: Normal pulses. Heart sounds: Normal heart sounds. No murmur. Pulmonary:      Effort: Pulmonary effort is normal.      Breath sounds: Normal breath sounds. No wheezing or rales. Abdominal:      General: Bowel sounds are normal. There is no distension. Palpations: Abdomen is soft. Tenderness: There is no tenderness. Musculoskeletal: Normal range of motion. General: No swelling or tenderness. Skin:     General: Skin is warm and dry.       Findings: No rash. Neurological:      General: No focal deficit present. Mental Status: She is alert and oriented to person, place, and time. Psychiatric:         Mood and Affect: Mood normal.         Thought Content: Thought content normal.         Judgment: Judgment normal.         Assessment:        Hospital Problems           Last Modified POA    * (Principal) Cellulitis of right foot 1/2/2020 Yes    Hypertension 12/31/2019 Yes    Morbid obesity due to excess calories ([facility] [address] 12/31/2019 Yes    Essential hypertension 12/31/2019 Yes    Hypothyroidism 12/31/2019 Yes    Diabetic polyneuropathy ([facility] [address] 12/31/2019 Yes    Type 2 diabetes mellitus with neurologic complication, with long-term current use of insulin ([facility] [address] 12/31/2019 Yes    Chronic diastolic congestive heart failure ([facility] [address] 12/31/2019 Yes    CAROLYN (acute kidney injury) ([facility] [address] 1/2/2020 Yes          Plan:      1. Left foot abscess/cellulitis. s/p I and D 1/5/20. Continue Ceftriaxone 2 gm daily and Diflucan. Stop date 1/10/20. However patient is unable to go to infusion center for daily IV antibiotic. ID consider PO antibiotic on discharge. Will continue IV antibiotic here.    2. Foot pain due to #1. Increase Norco frequency to q 3 hr prn  3. CAROLYN. Worsening. Related to #1 Continue IVF. Avoid nephrotoxic agents. Repeat BMP in the AM  4. Chronic diastolic CHF. No acute exacerbation. Continue Coreg and Lisinopril  5. Morbid obesity. counseled    6. Discharge planning - ongoing pending outpatient arrangement for long term antibiotic.     Julianna Hoyt MD  1/5/2020  12:06 PM

## 2020-01-06 ENCOUNTER — ANESTHESIA (OUTPATIENT)
Dept: OPERATING ROOM | Age: 45
DRG: 305 | End: 2020-01-06
Payer: COMMERCIAL

## 2020-01-06 ENCOUNTER — ANESTHESIA EVENT (OUTPATIENT)
Dept: OPERATING ROOM | Age: 45
DRG: 305 | End: 2020-01-06
Payer: COMMERCIAL

## 2020-01-06 ENCOUNTER — APPOINTMENT (OUTPATIENT)
Dept: ULTRASOUND IMAGING | Age: 45
DRG: 305 | End: 2020-01-06
Payer: COMMERCIAL

## 2020-01-06 ENCOUNTER — APPOINTMENT (OUTPATIENT)
Dept: GENERAL RADIOLOGY | Age: 45
DRG: 305 | End: 2020-01-06
Payer: COMMERCIAL

## 2020-01-06 VITALS — SYSTOLIC BLOOD PRESSURE: 168 MMHG | OXYGEN SATURATION: 94 % | DIASTOLIC BLOOD PRESSURE: 144 MMHG

## 2020-01-06 LAB
ABSOLUTE EOS #: 0.15 K/UL (ref 0–0.44)
ABSOLUTE IMMATURE GRANULOCYTE: 0.05 K/UL (ref 0–0.3)
ABSOLUTE LYMPH #: 1.31 K/UL (ref 1.1–3.7)
ABSOLUTE MONO #: 1.03 K/UL (ref 0.1–1.2)
BASOPHILS # BLD: 0 % (ref 0–2)
BASOPHILS ABSOLUTE: 0.05 K/UL (ref 0–0.2)
BUN BLDV-MCNC: 22 MG/DL (ref 6–20)
C-REACTIVE PROTEIN: 257.5 MG/L (ref 0–5)
COMPLEMENT C3: 182 MG/DL (ref 90–180)
COMPLEMENT C4: 48 MG/DL (ref 10–40)
CREAT SERPL-MCNC: 1.41 MG/DL (ref 0.5–0.9)
CULTURE: NORMAL
CULTURE: NORMAL
DIFFERENTIAL TYPE: ABNORMAL
EOSINOPHILS RELATIVE PERCENT: 1 % (ref 1–4)
GFR AFRICAN AMERICAN: 49 ML/MIN
GFR NON-AFRICAN AMERICAN: 40 ML/MIN
GFR SERPL CREATININE-BSD FRML MDRD: ABNORMAL ML/MIN/{1.73_M2}
GFR SERPL CREATININE-BSD FRML MDRD: ABNORMAL ML/MIN/{1.73_M2}
GLUCOSE BLD-MCNC: 212 MG/DL (ref 65–105)
GLUCOSE BLD-MCNC: 218 MG/DL (ref 65–105)
GLUCOSE BLD-MCNC: 237 MG/DL (ref 65–105)
HAV IGM SER IA-ACNC: NONREACTIVE
HCG QUALITATIVE: NEGATIVE
HCT VFR BLD CALC: 31.2 % (ref 36.3–47.1)
HEMOGLOBIN: 9.5 G/DL (ref 11.9–15.1)
HEPATITIS B CORE IGM ANTIBODY: NONREACTIVE
HEPATITIS B SURFACE ANTIGEN: NONREACTIVE
HEPATITIS C ANTIBODY: NONREACTIVE
IMMATURE GRANULOCYTES: 0 %
INTERVENTION: NORMAL
LYMPHOCYTES # BLD: 11 % (ref 24–43)
Lab: NORMAL
Lab: NORMAL
MCH RBC QN AUTO: 30 PG (ref 25.2–33.5)
MCHC RBC AUTO-ENTMCNC: 30.4 G/DL (ref 28.4–34.8)
MCV RBC AUTO: 98.4 FL (ref 82.6–102.9)
MONOCYTES # BLD: 9 % (ref 3–12)
NRBC AUTOMATED: 0 PER 100 WBC
PDW BLD-RTO: 13.3 % (ref 11.8–14.4)
PLATELET # BLD: 277 K/UL (ref 138–453)
PLATELET ESTIMATE: ABNORMAL
PMV BLD AUTO: 10.7 FL (ref 8.1–13.5)
RBC # BLD: 3.17 M/UL (ref 3.95–5.11)
RBC # BLD: ABNORMAL 10*6/UL
SEG NEUTROPHILS: 79 % (ref 36–65)
SEGMENTED NEUTROPHILS ABSOLUTE COUNT: 8.87 K/UL (ref 1.5–8.1)
SPECIMEN DESCRIPTION: NORMAL
SPECIMEN DESCRIPTION: NORMAL
WBC # BLD: 11.5 K/UL (ref 3.5–11.3)
WBC # BLD: ABNORMAL 10*3/UL

## 2020-01-06 PROCEDURE — 2500000003 HC RX 250 WO HCPCS: Performed by: NURSE PRACTITIONER

## 2020-01-06 PROCEDURE — 88311 DECALCIFY TISSUE: CPT

## 2020-01-06 PROCEDURE — 3600000003 HC SURGERY LEVEL 3 BASE: Performed by: PODIATRIST

## 2020-01-06 PROCEDURE — 36415 COLL VENOUS BLD VENIPUNCTURE: CPT

## 2020-01-06 PROCEDURE — 1200000000 HC SEMI PRIVATE

## 2020-01-06 PROCEDURE — 86160 COMPLEMENT ANTIGEN: CPT

## 2020-01-06 PROCEDURE — 99233 SBSQ HOSP IP/OBS HIGH 50: CPT | Performed by: INTERNAL MEDICINE

## 2020-01-06 PROCEDURE — 3700000000 HC ANESTHESIA ATTENDED CARE: Performed by: PODIATRIST

## 2020-01-06 PROCEDURE — 6370000000 HC RX 637 (ALT 250 FOR IP): Performed by: PHYSICIAN ASSISTANT

## 2020-01-06 PROCEDURE — 84520 ASSAY OF UREA NITROGEN: CPT

## 2020-01-06 PROCEDURE — 2580000003 HC RX 258: Performed by: INTERNAL MEDICINE

## 2020-01-06 PROCEDURE — 7100000010 HC PHASE II RECOVERY - FIRST 15 MIN: Performed by: PODIATRIST

## 2020-01-06 PROCEDURE — 82947 ASSAY GLUCOSE BLOOD QUANT: CPT

## 2020-01-06 PROCEDURE — 85025 COMPLETE CBC W/AUTO DIFF WBC: CPT

## 2020-01-06 PROCEDURE — 73630 X-RAY EXAM OF FOOT: CPT

## 2020-01-06 PROCEDURE — 80074 ACUTE HEPATITIS PANEL: CPT

## 2020-01-06 PROCEDURE — 3700000001 HC ADD 15 MINUTES (ANESTHESIA): Performed by: PODIATRIST

## 2020-01-06 PROCEDURE — 2580000003 HC RX 258: Performed by: STUDENT IN AN ORGANIZED HEALTH CARE EDUCATION/TRAINING PROGRAM

## 2020-01-06 PROCEDURE — 84703 CHORIONIC GONADOTROPIN ASSAY: CPT

## 2020-01-06 PROCEDURE — 83516 IMMUNOASSAY NONANTIBODY: CPT

## 2020-01-06 PROCEDURE — 2709999900 HC NON-CHARGEABLE SUPPLY: Performed by: PODIATRIST

## 2020-01-06 PROCEDURE — 6370000000 HC RX 637 (ALT 250 FOR IP): Performed by: STUDENT IN AN ORGANIZED HEALTH CARE EDUCATION/TRAINING PROGRAM

## 2020-01-06 PROCEDURE — 7100000011 HC PHASE II RECOVERY - ADDTL 15 MIN: Performed by: PODIATRIST

## 2020-01-06 PROCEDURE — 86140 C-REACTIVE PROTEIN: CPT

## 2020-01-06 PROCEDURE — 82565 ASSAY OF CREATININE: CPT

## 2020-01-06 PROCEDURE — 99232 SBSQ HOSP IP/OBS MODERATE 35: CPT | Performed by: INTERNAL MEDICINE

## 2020-01-06 PROCEDURE — 2580000003 HC RX 258: Performed by: PODIATRIST

## 2020-01-06 PROCEDURE — 2500000003 HC RX 250 WO HCPCS: Performed by: PODIATRIST

## 2020-01-06 PROCEDURE — 6370000000 HC RX 637 (ALT 250 FOR IP): Performed by: NURSE PRACTITIONER

## 2020-01-06 PROCEDURE — 0J9Q0ZZ DRAINAGE OF RIGHT FOOT SUBCUTANEOUS TISSUE AND FASCIA, OPEN APPROACH: ICD-10-PCS | Performed by: PODIATRIST

## 2020-01-06 PROCEDURE — 88305 TISSUE EXAM BY PATHOLOGIST: CPT

## 2020-01-06 PROCEDURE — 2500000003 HC RX 250 WO HCPCS: Performed by: NURSE ANESTHETIST, CERTIFIED REGISTERED

## 2020-01-06 PROCEDURE — 6370000000 HC RX 637 (ALT 250 FOR IP): Performed by: INTERNAL MEDICINE

## 2020-01-06 PROCEDURE — 6360000002 HC RX W HCPCS: Performed by: STUDENT IN AN ORGANIZED HEALTH CARE EDUCATION/TRAINING PROGRAM

## 2020-01-06 PROCEDURE — 6360000002 HC RX W HCPCS: Performed by: INTERNAL MEDICINE

## 2020-01-06 PROCEDURE — 2580000003 HC RX 258: Performed by: NURSE ANESTHETIST, CERTIFIED REGISTERED

## 2020-01-06 PROCEDURE — 3600000013 HC SURGERY LEVEL 3 ADDTL 15MIN: Performed by: PODIATRIST

## 2020-01-06 PROCEDURE — 76770 US EXAM ABDO BACK WALL COMP: CPT

## 2020-01-06 PROCEDURE — 6360000002 HC RX W HCPCS: Performed by: NURSE ANESTHETIST, CERTIFIED REGISTERED

## 2020-01-06 PROCEDURE — 28820 AMPUTATION OF TOE: CPT | Performed by: PODIATRIST

## 2020-01-06 PROCEDURE — 93923 UPR/LXTR ART STDY 3+ LVLS: CPT

## 2020-01-06 RX ORDER — MAGNESIUM HYDROXIDE 1200 MG/15ML
LIQUID ORAL CONTINUOUS PRN
Status: COMPLETED | OUTPATIENT
Start: 2020-01-06 | End: 2020-01-06

## 2020-01-06 RX ORDER — MORPHINE SULFATE 2 MG/ML
2 INJECTION, SOLUTION INTRAMUSCULAR; INTRAVENOUS EVERY 4 HOURS PRN
Status: DISCONTINUED | OUTPATIENT
Start: 2020-01-06 | End: 2020-01-09

## 2020-01-06 RX ORDER — PROPOFOL 10 MG/ML
INJECTION, EMULSION INTRAVENOUS CONTINUOUS PRN
Status: DISCONTINUED | OUTPATIENT
Start: 2020-01-06 | End: 2020-01-06 | Stop reason: SDUPTHER

## 2020-01-06 RX ORDER — LIDOCAINE HYDROCHLORIDE 10 MG/ML
INJECTION, SOLUTION EPIDURAL; INFILTRATION; INTRACAUDAL; PERINEURAL PRN
Status: DISCONTINUED | OUTPATIENT
Start: 2020-01-06 | End: 2020-01-06 | Stop reason: SDUPTHER

## 2020-01-06 RX ORDER — MIDAZOLAM HYDROCHLORIDE 1 MG/ML
INJECTION INTRAMUSCULAR; INTRAVENOUS PRN
Status: DISCONTINUED | OUTPATIENT
Start: 2020-01-06 | End: 2020-01-06 | Stop reason: SDUPTHER

## 2020-01-06 RX ORDER — FENTANYL CITRATE 50 UG/ML
INJECTION, SOLUTION INTRAMUSCULAR; INTRAVENOUS PRN
Status: DISCONTINUED | OUTPATIENT
Start: 2020-01-06 | End: 2020-01-06 | Stop reason: SDUPTHER

## 2020-01-06 RX ORDER — LIDOCAINE HYDROCHLORIDE 10 MG/ML
10 INJECTION, SOLUTION INFILTRATION; PERINEURAL ONCE
Status: DISCONTINUED | OUTPATIENT
Start: 2020-01-06 | End: 2020-01-06

## 2020-01-06 RX ORDER — PROPOFOL 10 MG/ML
INJECTION, EMULSION INTRAVENOUS PRN
Status: DISCONTINUED | OUTPATIENT
Start: 2020-01-06 | End: 2020-01-06 | Stop reason: SDUPTHER

## 2020-01-06 RX ORDER — SODIUM CHLORIDE, SODIUM LACTATE, POTASSIUM CHLORIDE, CALCIUM CHLORIDE 600; 310; 30; 20 MG/100ML; MG/100ML; MG/100ML; MG/100ML
INJECTION, SOLUTION INTRAVENOUS CONTINUOUS PRN
Status: DISCONTINUED | OUTPATIENT
Start: 2020-01-06 | End: 2020-01-06 | Stop reason: SDUPTHER

## 2020-01-06 RX ADMIN — ASPIRIN 81 MG 81 MG: 81 TABLET ORAL at 08:24

## 2020-01-06 RX ADMIN — MIDAZOLAM HYDROCHLORIDE 0.5 MG: 1 INJECTION, SOLUTION INTRAMUSCULAR; INTRAVENOUS at 17:37

## 2020-01-06 RX ADMIN — CARVEDILOL 12.5 MG: 12.5 TABLET, FILM COATED ORAL at 08:24

## 2020-01-06 RX ADMIN — FENTANYL CITRATE 25 MCG: 50 INJECTION INTRAMUSCULAR; INTRAVENOUS at 17:53

## 2020-01-06 RX ADMIN — SIMVASTATIN 20 MG: 20 TABLET, FILM COATED ORAL at 21:38

## 2020-01-06 RX ADMIN — PROPOFOL 20 MG: 10 INJECTION, EMULSION INTRAVENOUS at 17:31

## 2020-01-06 RX ADMIN — APIXABAN 2.5 MG: 2.5 TABLET, FILM COATED ORAL at 08:24

## 2020-01-06 RX ADMIN — SODIUM CHLORIDE, PRESERVATIVE FREE 10 ML: 5 INJECTION INTRAVENOUS at 21:38

## 2020-01-06 RX ADMIN — INSULIN LISPRO 3 UNITS: 100 INJECTION, SOLUTION INTRAVENOUS; SUBCUTANEOUS at 21:38

## 2020-01-06 RX ADMIN — ANTI-FUNGAL POWDER MICONAZOLE NITRATE TALC FREE: 1.42 POWDER TOPICAL at 22:28

## 2020-01-06 RX ADMIN — SODIUM CHLORIDE, POTASSIUM CHLORIDE, SODIUM LACTATE AND CALCIUM CHLORIDE: 600; 310; 30; 20 INJECTION, SOLUTION INTRAVENOUS at 17:20

## 2020-01-06 RX ADMIN — CEFTAROLINE FOSAMIL 600 MG: 600 POWDER, FOR SOLUTION INTRAVENOUS at 22:00

## 2020-01-06 RX ADMIN — PROPOFOL 30 MG: 10 INJECTION, EMULSION INTRAVENOUS at 17:30

## 2020-01-06 RX ADMIN — MIDAZOLAM HYDROCHLORIDE 1 MG: 1 INJECTION, SOLUTION INTRAMUSCULAR; INTRAVENOUS at 17:22

## 2020-01-06 RX ADMIN — PROPOFOL 50 MG: 10 INJECTION, EMULSION INTRAVENOUS at 17:29

## 2020-01-06 RX ADMIN — SODIUM CHLORIDE, PRESERVATIVE FREE 10 ML: 5 INJECTION INTRAVENOUS at 08:24

## 2020-01-06 RX ADMIN — ANTI-FUNGAL POWDER MICONAZOLE NITRATE TALC FREE: 1.42 POWDER TOPICAL at 08:24

## 2020-01-06 RX ADMIN — ANTI-FUNGAL POWDER MICONAZOLE NITRATE TALC FREE: 1.42 POWDER TOPICAL at 00:01

## 2020-01-06 RX ADMIN — HYDROCODONE BITARTRATE AND ACETAMINOPHEN 2 TABLET: 5; 325 TABLET ORAL at 20:19

## 2020-01-06 RX ADMIN — CEFTRIAXONE SODIUM 2 G: 2 INJECTION, POWDER, FOR SOLUTION INTRAMUSCULAR; INTRAVENOUS at 13:23

## 2020-01-06 RX ADMIN — LIDOCAINE HYDROCHLORIDE 50 MG: 10 INJECTION, SOLUTION EPIDURAL; INFILTRATION; INTRACAUDAL; PERINEURAL at 17:28

## 2020-01-06 RX ADMIN — MORPHINE SULFATE 2 MG: 2 INJECTION, SOLUTION INTRAMUSCULAR; INTRAVENOUS at 22:37

## 2020-01-06 RX ADMIN — FENTANYL CITRATE 50 MCG: 50 INJECTION INTRAMUSCULAR; INTRAVENOUS at 17:26

## 2020-01-06 RX ADMIN — FENTANYL CITRATE 25 MCG: 50 INJECTION INTRAMUSCULAR; INTRAVENOUS at 17:39

## 2020-01-06 RX ADMIN — LISINOPRIL AND HYDROCHLOROTHIAZIDE 1 TABLET: 25; 20 TABLET ORAL at 08:24

## 2020-01-06 RX ADMIN — PROPOFOL 50 MG: 10 INJECTION, EMULSION INTRAVENOUS at 17:28

## 2020-01-06 RX ADMIN — LEVOTHYROXINE SODIUM 50 MCG: 100 TABLET ORAL at 08:24

## 2020-01-06 RX ADMIN — APIXABAN 2.5 MG: 2.5 TABLET, FILM COATED ORAL at 21:38

## 2020-01-06 RX ADMIN — PROPOFOL 20 MCG/KG/MIN: 10 INJECTION, EMULSION INTRAVENOUS at 17:32

## 2020-01-06 RX ADMIN — HYDROCODONE BITARTRATE AND ACETAMINOPHEN 2 TABLET: 5; 325 TABLET ORAL at 08:47

## 2020-01-06 RX ADMIN — MIDAZOLAM HYDROCHLORIDE 0.5 MG: 1 INJECTION, SOLUTION INTRAMUSCULAR; INTRAVENOUS at 17:33

## 2020-01-06 RX ADMIN — METFORMIN HYDROCHLORIDE 1000 MG: 500 TABLET ORAL at 08:24

## 2020-01-06 RX ADMIN — FLUCONAZOLE 150 MG: 100 TABLET ORAL at 00:01

## 2020-01-06 RX ADMIN — HYDROCODONE BITARTRATE AND ACETAMINOPHEN 2 TABLET: 5; 325 TABLET ORAL at 23:50

## 2020-01-06 RX ADMIN — INSULIN GLARGINE 40 UNITS: 100 INJECTION, SOLUTION SUBCUTANEOUS at 21:38

## 2020-01-06 ASSESSMENT — PAIN SCALES - GENERAL
PAINLEVEL_OUTOF10: 8
PAINLEVEL_OUTOF10: 0
PAINLEVEL_OUTOF10: 8
PAINLEVEL_OUTOF10: 0
PAINLEVEL_OUTOF10: 3
PAINLEVEL_OUTOF10: 0
PAINLEVEL_OUTOF10: 7
PAINLEVEL_OUTOF10: 7
PAINLEVEL_OUTOF10: 10

## 2020-01-06 ASSESSMENT — PULMONARY FUNCTION TESTS
PIF_VALUE: 0
PIF_VALUE: 1
PIF_VALUE: 0
PIF_VALUE: 3
PIF_VALUE: 1
PIF_VALUE: 0
PIF_VALUE: 1
PIF_VALUE: 3
PIF_VALUE: 1

## 2020-01-06 ASSESSMENT — ENCOUNTER SYMPTOMS
BACK PAIN: 0
WHEEZING: 0
BLOOD IN STOOL: 0
VOMITING: 0
EYE DISCHARGE: 0
SHORTNESS OF BREATH: 0
ABDOMINAL PAIN: 0
NAUSEA: 0
COLOR CHANGE: 0

## 2020-01-06 ASSESSMENT — PAIN - FUNCTIONAL ASSESSMENT: PAIN_FUNCTIONAL_ASSESSMENT: 0-10

## 2020-01-06 ASSESSMENT — LIFESTYLE VARIABLES: SMOKING_STATUS: 0

## 2020-01-06 NOTE — PROGRESS NOTES
[facility] - Progress Note    Today's Date and Time: 1/6/2020 , 3:06 PM    Impression :   · Rt great toe diabetic infection  · Foot cellulitis  · Gas formation in tissues  · S/P I&D per Podiatry 12-31  · DM II not controlled  · CAD s/p stenting x 2  · Non compliance    Recommendations:     · D/C Ceftriaxone 2 gm IV q 24 hr.   · Ceftaroline 600 mg IV  q 12 hr, pending exclusion of MRSA. Stop date 1-15-20  · Continue elevation of the leg and foot with 3-4 pillows. · Kerlex and ACE wraps to the foot and ankle. · Diflucan 200 mg po daily  · Surgical debridement as per Podiatry. Medical Decision Making/Summary/Discussion:1/6/2020     ·   Infection Control Recommendations   · [place] Precautions    Antimicrobial Stewardship Recommendations     · Targeted therapy  · Restricted medication    Coordination of Outpatient Care:   · Estimated Length of IV antimicrobials: TBD  · Patient will need Midline Catheter Insertion:  No  · Patient will need PICC line Insertion: No  · Patient will need: Home IV , [place],  SNF,  LTAC: No  · Patient will need outpatient wound care: TBD    Chief complaint/reason for consultation:   · Diabetic foot infection, uncontrolled DM      History of Present Illness:   Rola Aguilar is a 38y.o.-year-old  female who was initially admitted on 12/30/2019. Patient seen at the request of Dr. Nasir Montaño:    Pt is a 37 yo woman visiting from [place]. She noted some Rt great toe pain on 12-24. By 12-30 the pain had progressed and her foot was swollen and tender so she presented to the ED for further evaluation. Pt also has a history of CAD with multiple stents in place, PE and IDDM. She reports that she lost her insurance 6 months ago and has not been taking her Brilinta, Eliquis or Insulin as a result. She denies any chills, fevers, night sweats or malaise prior to presenting to the ED.     In the ED she was found to EXAMINATION:   MRI OF THE RIGHT HINDFOOT WITHOUT AND WITH CONTRAST, 12/31/2019 4:22 pm       TECHNIQUE:   Multiplanar multisequence MRI of the right foot was performed without and   with the administration of intravenous contrast.       COMPARISON:   Right foot radiographs 12/30/2019.       HISTORY:   ORDERING SYSTEM PROVIDED HISTORY: right foot wound   TECHNOLOGIST PROVIDED HISTORY:   right foot wound   What is the area of interest?->Forefoot   Is the patient pregnant?->No       The patient reported to the MRI tech that she had complaints involving the   heel and midfoot so a hindfoot study was performed.  The clinician has   requested that the study be repeated as a forefoot.       FINDINGS:   PLANTAR FASCIA: Mild chronic thickening of the central cord of the plantar   fascia.  No acute plantar fasciitis or plantar fascia tear.  Severe fatty   atrophy of the abductor digiti minimi muscle.       ACHILLES TENDON: The Achilles tendon is normal in position, morphology and   signal.  No associated bursitis.       BONE MARROW: No fracture or dislocation.  No abnormal marrow space-occupying   lesion.  No osseous erosion.       JOINT SPACES: The tibiotalar and subtalar joints are normal in appearance.    The talonavicular and calcaneocuboid joints are unremarkable.  The midfoot is   unremarkable.  No joint effusion.       SYNDESMOTIC LIGAMENTS: The anterior and posterior inferior tibiofibular   ligaments are intact.       LATERAL COLLATERAL LIGAMENT COMPLEX: The anterior talofibular ligament,   calcaneofibular ligament and posterior talofibular ligaments are without   acute abnormality.       DELTOID LIGAMENT COMPLEX:  The superficial and deep components of the deltoid   ligament complex are normal.       SINUS TARSI AND SPRING LIGAMENT: The sinus tarsi fat plug is grossly   preserved.  The spring ligament complex is intact.       MEDIAL TENDONS: The posterior tibialis, flexor digitorum longus and flexor   hallucis longus tendons are intact.       LATERAL TENDONS:  The peroneus longus and brevis tendons are intact.       ANTERIOR TENDONS: The tibialis anterior, extensor hallucis longus and   extensor digitorum longus tendons are normal in position, morphology and   signal.       TARSAL TUNNEL: There are no obstructing lesions within the tarsal tunnel.       MISCELLANEOUS: Circumferential subcutaneous edema about the ankle and along   the dorsum of the midfoot.  No soft tissue ulceration, sinus tract, or   drainable fluid collection.  No abnormal soft tissue mass.           Impression   1. No osteomyelitis or other acute osseous abnormality of the hindfoot.    2. Circumferential subcutaneous edema about the ankle and along the dorsum of   the midfoot compatible with bland edema versus cellulitis.  No deep soft   tissue ulceration or drainable fluid collection.         EXAMINATION:   MRI OF THE RIGHT FOREFOOT WITHOUT AND WITH CONTRAST, 1/2/2020 10:00 am       TECHNIQUE:   Multiplanar multisequence MRI of the right forefoot was performed without and   with the administration of intravenous contrast.       COMPARISON:   12/31/2019 hindfoot MRI       HISTORY:   ORDERING SYSTEM PROVIDED HISTORY: Wound   TECHNOLOGIST PROVIDED HISTORY:   Wound   What is the area of interest?->Forefoot   Is the patient pregnant?->No       Wound at the plantar aspect of the great toe.       FINDINGS:   There is mild subcutaneous edema in the dorsal and plantar forefoot.  Focal   confluent soft tissue edema measuring approximately 1.9 x 0.8 x 2.1 cm is   seen in the soft tissues at the plantar aspect of the proximal great toe,   superficial to the flexor tendon, adjacent to the proximal phalanx.  There is   appears to be intervening fat on the T1 weighted sequences.  No definite   organized fluid collection identified in the soft tissues.  No abnormal   enhancement.  There are a couple low signal foci within the plantar soft   tissues of the great toe, somewhat linear appearance, suggesting a vascular   structure, but possibility of soft tissue gas is not excluded. Leonard Gala is a   small effusion at the 1st MTP joint.  No marrow signal abnormality.       There is a small amount of fluid in the 1st and 3rd intermetatarsal bursa. No abnormal intermetatarsal enhancing mass.       There is mild increased signal within the intrinsic muscles of the foot. There is mild scattered fatty atrophy of the intrinsic muscles.       The visualized portions of the flexor and extensor tendons are intact.           Impression   1. No evidence of osteomyelitis in the right forefoot. 2. Focal confluent soft tissue edema at the plantar aspect of the proximal   great toe, possibly focal cellulitis or phlegmon.  No definite organized   fluid collection to indicate abscess at this time.  This area measures   approximately 1.9 x 0.8 x 2.1 cm.   3. A couple low signal foci are seen within the plantar soft tissues of the   proximal great toe, favored to be vascular structures but possibility of soft   tissue gas is not entirely excluded. 4. Small joint effusion at the 1st MTP joint. Medical Decision Oraynj-[id number]-Vydrv:   1/1/2020  6:17 PM - Ruben Christianson Incoming Lab Results From [email or web address]     Specimen Information: Foot        Component Collected Lab   Specimen Description 12/31/2019 12:21  [address]   . FOOT RIGHT    Special Requests 12/31/2019 12:21  [address]   NOT REPORTED    Direct Exam 12/31/2019 12:21  [address]   NO NEUTROPHILS SEEN    Direct Exam Abnormal  12/31/2019 12:21 PM [address] COCCI IN [place]    Direct Exam Abnormal  12/31/2019 12:21 PM [address]    Culture Abnormal  12/31/2019 12:21  [address]   STREPTOCOCCI, BETA HEMOLYTIC GROUP B MODERATE GROWTH    Culture Abnormal  12/31/2019 12:21 PM [facility] CANDIDA ALBICANS MODERATE GROWTH    Culture 12/31/2019 12:21  [address]        1/5/2020  3:40 PM - Sammy, pn Incoming Lab Results From [facility]     Specimen Information: Foot        Component Collected Lab   Specimen Description 12/31/2019 12:21  [address]   . FOOT RIGHT    Special Requests 12/31/2019 12:21  [address]   NOT REPORTED    Direct Exam 12/31/2019 12:21  [address]   NO NEUTROPHILS SEEN    Direct Exam Abnormal  12/31/2019 12:21 PM [address] COCCI IN [place]    Direct Exam Abnormal  12/31/2019 12:21 PM [address]    Culture Abnormal  12/31/2019 12:21 PM [address] B MODERATE GROWTH    Culture Abnormal  12/31/2019 12:21 PM [address]    Culture Abnormal  12/31/2019 12:21 PM [place]    Culture 12/31/2019 12:21  [address]    Culture Abnormal  12/31/2019 12:21  [address]   This is a mixed culture of organisms, which may represent colonization or contamination.  Notify the laboratory within 7 days for further workup.  Susceptibilities have not been performed. Culture Abnormal  12/31/2019 12:21  [address] (BACTEROIDES BIVIUS) BIVIA LIGHT GROWTH BETA LACTAMASE POSITIVE    Testing Performed By     1/5/2020  3:40 PM - Sammy, abimbola Incoming Lab Results From [facility]     Specimen Information: Foot        Component Collected Lab   Specimen Description 12/31/2019 12:21  [address]   . FOOT RIGHT    Special Requests 12/31/2019 12:21  [address]   NOT REPORTED    Direct Exam 12/31/2019 12:21 PM [facility]   NO NEUTROPHILS SEEN    Direct Exam Abnormal  12/31/2019 12:21  [address]   RARE GRAM POSITIVE COCCI IN PAIRS    Direct Exam Abnormal  12/31/2019 12:21 PM [address]    Culture Abnormal  12/31/2019 12:21 PM [address], BETA HEMOLYTIC GROUP B MODERATE GROWTH    Culture Abnormal  12/31/2019 12:21 PM [address] GROWTH    Culture Abnormal  12/31/2019 12:21 PM [place]    Culture 12/31/2019 12:21  [address]    Culture Abnormal  12/31/2019 12:21  [address]   This is a mixed culture of organisms, which may represent colonization or contamination.  Notify the laboratory within 7 days for further workup.  Susceptibilities have not been performed. Culture Abnormal  12/31/2019 12:21  [address] (BACTEROIDES BIVIUS) BIVIA LIGHT GROWTH BETA LACTAMASE POSITIVE    Testing Performed By       Medical Decision Making-Other:     Note:  · Labs, medications, radiologic studies were reviewed with personal review of films  · Moderate Large amounts of data were reviewed  · Discussed with nursing Staff, Discharge planner  · Infection Control and Prevention measures reviewed  · All prior entries were reviewed  · Administer medications as ordered  · Prognosis: Fair  · Discharge planning reviewed  · Follow up as outpatient. Thank you for allowing us to participate in the care of this patient. Please call with questions.     Franny Latif MD  Pager: [phone] - Office: [phone]

## 2020-01-06 NOTE — PROGRESS NOTES
Genitourinary: Negative for dysuria and frequency. Musculoskeletal: Negative for arthralgias, back pain and joint swelling. Skin: Negative for color change and rash. Neurological: Negative for dizziness, tremors, seizures and light-headedness. Psychiatric/Behavioral: Negative for agitation and confusion. Medications: Allergies:  No Known Allergies    Current Meds:   Scheduled Meds:    miconazole   Topical BID    cefTRIAXone (ROCEPHIN) IV  2 g Intravenous Q24H    sodium chloride flush  10 mL Intravenous BID    carvedilol  12.5 mg Oral BID WC    insulin glargine  40 Units Subcutaneous Nightly    aspirin  81 mg Oral Daily    levothyroxine  50 mcg Oral Daily    lisinopril-hydrochlorothiazide  1 tablet Oral Daily    metFORMIN  1,000 mg Oral BID WC    simvastatin  20 mg Oral Nightly    apixaban  2.5 mg Oral BID    insulin lispro  0-18 Units Subcutaneous TID WC    insulin lispro  0-9 Units Subcutaneous Nightly     Continuous Infusions:    sodium chloride 75 mL/hr at 01/05/20 1156    dextrose       PRN Meds: HYDROcodone 5 mg - acetaminophen, diphenhydrAMINE, sodium chloride flush, sodium chloride flush, potassium chloride **OR** potassium alternative oral replacement **OR** potassium chloride, magnesium sulfate, magnesium hydroxide, nicotine, ondansetron, acetaminophen, glucose, dextrose, glucagon (rDNA), dextrose    Data:     Past Medical History:   has a past medical history of Hyperlipidemia, Hypertension, Hypothyroidism, Type II or unspecified type diabetes mellitus without mention of complication, not stated as uncontrolled, and Urinary incontinence. Social History:   reports that she has quit smoking. She smoked 0.10 packs per day. She does not have any smokeless tobacco history on file. She reports that she does not drink alcohol or use drugs.      Family History:   Family History   Problem Relation Age of Onset    Stroke Father     Heart Disease Other        Vitals:  BP (!)

## 2020-01-06 NOTE — PLAN OF CARE
Problem: Falls - Risk of:  Goal: Will remain free from falls  Description  Will remain free from falls  Outcome: Ongoing  Goal: Absence of physical injury  Description  Absence of physical injury  Outcome: Ongoing     Problem: Skin Integrity:  Goal: Demonstration of wound healing without infection will improve  Description  Demonstration of wound healing without infection will improve  Outcome: Ongoing  Goal: Complications related to intravenous access or infusion will be avoided or minimized  Description  Complications related to intravenous access or infusion will be avoided or minimized  Outcome: Ongoing  Goal: Will show no infection signs and symptoms  Description  Will show no infection signs and symptoms  Outcome: Ongoing  Goal: Absence of new skin breakdown  Description  Absence of new skin breakdown  Outcome: Ongoing     Problem: Pain:  Goal: Pain level will decrease  Description  Pain level will decrease  Outcome: Ongoing  Goal: Control of acute pain  Description  Control of acute pain  Outcome: Ongoing  Goal: Control of chronic pain  Description  Control of chronic pain  Outcome: Ongoing     Problem: Discharge Planning:  Goal: Discharged to appropriate level of care  Description  Discharged to appropriate level of care  Outcome: Ongoing     Problem: Serum Glucose Level - Abnormal:  Goal: Ability to maintain appropriate glucose levels will improve  Description  Ability to maintain appropriate glucose levels will improve  Outcome: Ongoing     Problem: Sensory Perception - Impaired:  Goal: Ability to maintain a stable neurologic state will improve  Description  Ability to maintain a stable neurologic state will improve  Outcome: Ongoing     Problem:  Body Temperature - Imbalanced:  Goal: Ability to maintain a body temperature in the normal range will improve  Description  Ability to maintain a body temperature in the normal range will improve  Outcome: Ongoing     Problem: Mobility - Impaired:  Goal:

## 2020-01-06 NOTE — PROGRESS NOTES
Brief Update:     Discussed with patient results of X-rays and need for formal incision and drainage and/or partial 1st ray amputation, right foot. Risks and benefits explained. No guarantees given or implied. Consent signed and placed in chart.  Plan for surgery to go later today, 1/6/20, at approximately 5 pm.    Janis Armas DPM  Podiatry Resident, PGY1

## 2020-01-06 NOTE — PROGRESS NOTES
Nutrition Assessment    Type and Reason for Visit: Initial(LOS day 7)    Nutrition Recommendations:   -Continue NPO status   -Restart diet as able   -Pt denied all nutritional supplements at this time   -Will monitor nutrition progression    Nutrition Assessment:  Pt admitted d/t Rt foot pain. Pt w/ abscess to Rt toe - s/p I&D. Pt stated that her appetite has been poor this week but denies all nutritional supplements. UBW is unknown. No wt hx in EMR. Will monitor for restart of diet and wound healing progression. Malnutrition Assessment:  · Malnutrition Status: At risk for malnutrition  · Context: Acute illness or injury  · Findings of the 6 clinical characteristics of malnutrition (Minimum of 2 out of 6 clinical characteristics is required to make the diagnosis of moderate or severe Protein Calorie Malnutrition based on AND/ASPEN Guidelines):  1. Energy Intake-Less than or equal to 50% of estimated energy requirement, Greater than or equal to 7 days    2. Weight Loss-No significant weight loss,    3. Fat Loss-No significant subcutaneous fat loss,    4. Muscle Loss-No significant muscle mass loss,    5. Fluid Accumulation-Mild fluid accumulation, Extremities, Generalized  6.  Strength-Not measured    Nutrition Risk Level:  Moderate    Nutrient Needs:  · Estimated Daily Total Kcal: 1.2-1.3 ~>7603-6299 kcals/d   · Estimated Daily Protein (g): 1.2-1.5 gm/kg ~>58-72 gms/d     Nutrition Diagnosis:   · Problem: Inadequate oral intake  · Etiology: related to Insufficient energy/nutrient consumption(medical condition )     Signs and symptoms:  as evidenced by Patient report of, Diet history of poor intake, Intake 0-25%, Intake 25-50%, NPO status due to medical condition    Objective Information:  · Wound Type: (abscess toe wounds )  · Current Nutrition Therapies:  · Oral Diet Orders: NPO   · Anthropometric Measures:  · Ht: 5' 1\" (154.9 cm)   · Current Body Wt: 285 lb (129.3 kg)  · Admission Body Wt: 270 lb (122.5

## 2020-01-06 NOTE — CONSULTS
NEPHROLOGY     REASON FOR CONSULT:     CAROLYN (BUN/Creat  1.4          With baseline creatinine     1           )          HISTORY OF PRESENTING ILLNESS                 This is a 38 y.o. female who presented with right foot pain. She also noticed deep red/purple color in the great toe with swelling extending into the dorsum of the foot. She reported no history of any injury/fever/nausea/vomiting/diarrhea. She has history of diabetic neuropathy. Initial assessment showed stable vitals without hemodynamic compromise. Imaging studies showing evidence of likely gas gangrene. Podiatry was consulted and underwent I and D. ID managing Abx. We have been consulted when noted to have creat 1.4.   Baseline creat 1  Urine output decent  No history of contrast exposure  No history of hypotension  No history of NSAID/nephrotoxic agents  No history suggestive of obstruction  No history of nausea/vomiting/diarrhoea  No history of CAROLYN in past  No history of recurrent UTI infection/surgeries to KUB          PAST MEDICAL HISTORY         Diagnosis Date    Hyperlipidemia     Hypertension     Hypothyroidism     Type II or unspecified type diabetes mellitus without mention of complication, not stated as uncontrolled     Urinary incontinence        PAST SURGICAL HISTORY          Procedure Laterality Date     SECTION      2 times    CHOLECYSTECTOMY      TOENAIL EXCISION         MEDICATIONS     Home Meds:                Medications Prior to Admission: glipiZIDE (GLUCOTROL) 10 MG tablet, Take 2 tablets by mouth 2 times daily (before meals)  levothyroxine (SYNTHROID) 50 MCG tablet, Take 1 tablet by mouth daily  carvedilol (COREG) 12.5 MG tablet, Take 1 tablet by mouth 2 times daily (with meals) Not sure if using  lisinopril-hydrochlorothiazide (PRINZIDE;ZESTORETIC) 20-25 MG per tablet, Take 1 tablet by mouth daily 40/25  metFORMIN (GLUCOPHAGE) 1000 MG tablet, Take 1 tablet by mouth 2 times daily (with meals)  glucose Marital status:      Spouse name: Not on file    Number of children: Not on file    Years of education: Not on file    Highest education level: Not on file   Occupational History    Not on file   Social Needs    Financial resource strain: Not on file    Food insecurity:     Worry: Not on file     Inability: Not on file    Transportation needs:     Medical: Not on file     Non-medical: Not on file   Tobacco Use    Smoking status: Former Smoker     Packs/day: 0.10   Substance and Sexual Activity    Alcohol use: No    Drug use: No    Sexual activity: Not on file   Lifestyle    Physical activity:     Days per week: Not on file     Minutes per session: Not on file    Stress: Not on file   Relationships    Social connections:     Talks on phone: Not on file     Gets together: Not on file     Attends [employer] service: Not on file     Active member of club or organization: Not on file     Attends meetings of clubs or organizations: Not on file     Relationship status: Not on file    Intimate partner violence:     Fear of current or ex partner: Not on file     Emotionally abused: Not on file     Physically abused: Not on file     Forced sexual activity: Not on file   Other Topics Concern    Not on file   Social History Narrative    Not on file       FAMILY HISTORY     Family History   Problem Relation Age of Onset    Stroke Father     Heart Disease Other           REVIEW OF SYSTEM     Constitutional: No asthenia/weight loss/anorexia    HEENT : No epistaxis/visual blurriness/rhinorrhoea/sorethroat/trauma  Cardiovascular:No chest pain/palpitation/SOB  Respiratory: No cough/fever/SOB/Wheezing    Gastrointestinal: No abdominal pain/nausea/vomiting/diarrhoea/constipation  Genitourinary: No dysuria/pyuria/hematuria/incomplete emptying of bladder  Musculoskeletal:  No gait disturbance/weakness or joint complaints RT FOOT PAIN  Integumentary: No rash or pruritis.   Neurological: No headache/diplopia/change in muscle strength/numbness or tingling. No change in gait, balance, coordination, mood, affect, memory, mentation, behavior. Psychiatric: No anxiety/depression. Endocrine: No temperature intolerance. No excessive thirst, fluid intake, or urination. No tremor. Hematologic/Lymphatic: No abnormal bruising or bleeding, blood clots or swolle lymph nodes. Allergic/Immunologic: No nasal congestion or hives      PHYSICAL EXAM     Vitals:    01/05/20 0536 01/05/20 0754 01/05/20 1831 01/06/20 0500   BP:  (!) 174/87 (!) 156/76    Pulse:  86 92    Resp:  20 23    Temp:  98.4 °F (36.9 °C) 98.3 °F (36.8 °C)    TempSrc:  Oral Oral    SpO2:   97%    Weight: 279 lb 12.8 oz (126.9 kg)   285 lb 12.8 oz (129.6 kg)   Height:         24HR INTAKE/OUTPUT:      Intake/Output Summary (Last 24 hours) at 1/6/2020 1541  Last data filed at 1/6/2020 0500  Gross per 24 hour   Intake 656.6 ml   Output 800 ml   Net -143.4 ml       General appearance:Awake, alert, in no acute distress  Skin: warm and dry, no rash or erythema  Eyes: conjunctivae normal and sclera anicteric  ENT: no thrush no pharyngeal congestion  orodental hygiene   Neck: No JVD, Lymphadenopathty or thyromegaly  Respiratory: vesicular breath sounds,no wheeze/crackles  Cardiovascular: S1 S2 normal,no gallop or organic murmur. No carotid bruit  Abdomen:Non tender/non distended. Bowel sounds present  Extremities: No Cyanosis or Clubbing,present Lower extremity edema  Neurological:Alert and oriented. No abnormalities of mood, affect, memory, mentation, or behavior are noted    INVESTIGATIONS      CBC:   Recent Labs     01/05/20  1300 01/06/20  0350   WBC 11.3 11.5*   RBC 3.92* 3.17*   HGB 11.6* 9.5*   HCT 36.8 31.2*   MCV 93.9 98.4   MCH 29.6 30.0   MCHC 31.5 30.4   RDW 13.2 13.3    277   MPV 10.7 10.7      BMP:   Recent Labs     01/04/20  0349 01/05/20  0501 01/06/20  0350   BUN 18 19 22*   CREATININE 1.25* 1.28* 1.41*        Phosphorus:  No results for input(s): PHOS in the last 72 hours. Magnesium: No results for input(s): MG in the last 72 hours. Albumin: No results for input(s): LABALBU in the last 72 hours. Radiology:  Reviewed as available. ASSESSMENT     1. Acute kidney injury nonoliguric secondary to prerenal injury in the face of foot infection -evolving. Baseline creatinine around 1  2. Right great toe diabetic infection/gas formation in tissues status post I&D per podiatry on 31st December  3. Type 2 diabetes  4. Essential hypertension  5. Coronary artery disease status post stent placement in the past     PLAN:     1. Continue IV fluids  2. Ultrasound of the kidneys along with urine studies  3. Avoid nephrotoxins. Hold Metformin AND ACE-I  4. Antibiotics as per estimated GFR  5. Will follow        Thank you for the consultation. Please do not hesitate to call with questions. This note is created with the assistance of a speech-recognition program. While intending to generate a document that actually reflects the content of the visit, no guarantees can be provided that every mistake has been identified and corrected by editing.     Ana Rosa Ortega MD, MRCP Tiffany Malagon, FACP   1/6/2020 3:41 PM    [facility]

## 2020-01-06 NOTE — PROGRESS NOTES
Progress Note  Podiatric Medicine and Surgery     Subjective     CC: Right foot pain, diabetic wound to right foot    Patient seen and evaluated bedside  Afebrile, vital signs stable  Patient denies vomiting, fever, chills, chest pain, shortness of breath  Patient reports some nausea overnight   Patient states pain to the right foot has decreased in severity but become more constant since bedside I&D    HPI :    Kaila Loja is a 38 y.o. female with a past medical history of DM2 with neuropathy was seen at [facility] for right foot pain and wound. Pt states that about 1 week ago her right great toe became very painful and this morning she noted worsening pain and new redness of her right great toe. Pt denies any trauma or stepping on anything sharp. She notes her redness progressively has moved up towards her ankle. Patient is originally from [place] and has moved here, she currently does not have her insulin. Pt is also complaining of calf pain. She has a history of a DVT and PE. Pt denies any N/V/F/C/CP/SOB.     PCP is Brock Floyd, APRN - CNP    ROS: Denies N/V/F/C/SOB/CP. Otherwise negative except at stated in the HPI.      Medications:  Scheduled Meds:   lidocaine  10 mL Intradermal Once    miconazole   Topical BID    cefTRIAXone (ROCEPHIN) IV  2 g Intravenous Q24H    sodium chloride flush  10 mL Intravenous BID    carvedilol  12.5 mg Oral BID     insulin glargine  40 Units Subcutaneous Nightly    aspirin  81 mg Oral Daily    levothyroxine  50 mcg Oral Daily    lisinopril-hydrochlorothiazide  1 tablet Oral Daily    metFORMIN  1,000 mg Oral BID     simvastatin  20 mg Oral Nightly    apixaban  2.5 mg Oral BID    insulin lispro  0-18 Units Subcutaneous TID     insulin lispro  0-9 Units Subcutaneous Nightly       Continuous Infusions:   sodium chloride 75 mL/hr at 01/05/20 1156    dextrose         PRN Meds:HYDROcodone 5 mg - acetaminophen, diphenhydrAMINE, sodium chloride flush, sodium chloride flush, potassium chloride **OR** potassium alternative oral replacement **OR** potassium chloride, magnesium sulfate, magnesium hydroxide, nicotine, ondansetron, acetaminophen, glucose, dextrose, glucagon (rDNA), dextrose    Objective     Vitals:  Patient Vitals for the past 8 hrs:   Weight   20 0500 285 lb 12.8 oz (129.6 kg)     Average, Min, and Max for last 24 hours Vitals:  TEMPERATURE:  Temp  Av.3 °F (36.8 °C)  Min: 98.3 °F (36.8 °C)  Max: 98.3 °F (36.8 °C)    RESPIRATIONS RANGE: Resp  Av  Min: 23  Max: 23    PULSE RANGE: Pulse  Av  Min: 92  Max: 92    BLOOD PRESSURE RANGE:  Systolic (81PEY), JWK:568 , Min:156 , NEB:211   ; Diastolic (10VGH), LIC:24, Min:76, Max:76      PULSE OXIMETRY RANGE: SpO2  Av %  Min: 97 %  Max: 97 %    I/O last 3 completed shifts: In: 656.6 [P.O.:120; I.V.:536.6]  Out: 800 [Urine:800]    CBC:  Recent Labs     20  17220  1300 20  0350   WBC  --  11.3 11.5*   HGB  --  11.6* 9.5*   HCT  --  36.8 31.2*   PLT  --  335 277   .5* 262.9* 257.5*        BMP:  Recent Labs     20  0349 20  0501 20  0350   BUN 18 19 22*   CREATININE 1.25* 1.28* 1.41*        Coags:  No results for input(s): APTT, PROT, INR in the last 72 hours. Lab Results   Component Value Date    SEDRATE >130 (H) 2020     Recent Labs     20  17220  1300 20  0350   .5* 262.9* 257.5*       Lower Extremity Physical Exam:    Vascular: DP and PT pulses are palpable. CFT <3 seconds to all digits. Hair growth is absent to the level of the digits. +1 pitting edema to the right foot     Neuro: Saph/sural/SP/DP/plantar sensation diminished to light touch.     Musculoskeletal: Muscle strength is 5/5 to all lower extremity muscle groups on Left, deferred on the right. Gross deformity is absent. Tenderness to palpation of the plantar and dorsal aspect of the right hallux.  Painful ROM of right ankle     Dermatologic: Full thickness pinpoint ulcer #1 located plantar sulcus of right hallux and measures approximately 0.1 cm x 0.1 cm x 0.5 cm. Base is granular. Periwound skin is hyperkeratotic. Seropurulent drainage expressed from the wound. Erythema on dorsum of foot unchanged from previous physical exam.  Wound does not probe to bone, sinus track or undermine. No fluctuance or induration but crepitus heard. Interdigital maceration absent.      Hemorrhagic bulla noted to the dorsum of the right foot. After de-lisa, beefy red skin found underneath. Left foot is warm and dry with no open wounds       Clinical Images from 1/4/20: Unchanged today                              Imaging:   US RENAL COMPLETE   Final Result   Unremarkable ultrasound of the kidneys and urinary bladder. XR FOOT RIGHT (MIN 3 VIEWS)   Preliminary Result   1. Soft tissue swelling and gas in the great toe, extending to the level of   the 1st metatarsal head, concerning for _____  gangrene. 2.  No radiographic evidence of osteomyelitis. MRI FOOT RIGHT W WO CONTRAST   Final Result   1. No evidence of osteomyelitis in the right forefoot. 2. Focal confluent soft tissue edema at the plantar aspect of the proximal   great toe, possibly focal cellulitis or phlegmon. No definite organized   fluid collection to indicate abscess at this time. This area measures   approximately 1.9 x 0.8 x 2.1 cm.   3. A couple low signal foci are seen within the plantar soft tissues of the   proximal great toe, favored to be vascular structures but possibility of soft   tissue gas is not entirely excluded. 4. Small joint effusion at the 1st MTP joint. MRI FOOT RIGHT W WO CONTRAST   Final Result   1. No osteomyelitis or other acute osseous abnormality of the hindfoot. 2. Circumferential subcutaneous edema about the ankle and along the dorsum of   the midfoot compatible with bland edema versus cellulitis.   No deep soft   tissue ulceration or drainable today.  Risks and benefits of procedure explained to patient. No guarantees given or implied. Consent signed and placed in chart. Patient made n.p.o. since 20 . · Continue daily dressing changes to right foot: 1/4\" packing, DSD, ACE  · NWB to Right lower extremity in surgical shoe. · Discussed with Dr. Kaushik Ron DPM   Podiatric Medicine & Surgery   1/6/2020 at 11:56 AM     I performed a history and physical examination of the patient and discussed management with the resident. I reviewed the residents note and agree with the documented findings and plan of care. Any areas of disagreement are noted on the chart. I was personally present for the key portions of any procedures. I have documented in the chart those procedures where I was not present during the key portions. I have reviewed the Podiatry Resident progress note. I agree with the chief complaint, past medical history, past surgical history, allergies, medications, social and family history as documented unless otherwise noted below. Documentation of the HPI, Physical Exam and Medical Decision Making performed by medical students or scribes is based on my personal performance of the HPI, PE and MDM. I have personally evaluated this patient and have completed at least one if not all key elements of the E/M (history, physical exam, and MDM). Additional findings are as noted.      Derrick Cassidy DPM on 1/7/2020 at 0:78 AM  Board Certified, American Board of Podiatric Surgery  Fellow, [employer] [facility] [employer] and [facility]

## 2020-01-06 NOTE — ANESTHESIA PRE PROCEDURE
Department of Anesthesiology  Preprocedure Note       Name:  Rigo Steele   Age:  38 y.o.  :  1975                                          MRN:  [id number]         Date:  2020      Surgeon: Giovanni Michelle):  Romana Gavin DPM    Procedure: INCISION AND DRAINAGE FOOT (Right )    Medications prior to admission:   Prior to Admission medications    Medication Sig Start Date End Date Taking? Authorizing Provider   glipiZIDE (GLUCOTROL) 10 MG tablet Take 2 tablets by mouth 2 times daily (before meals) 16   Minus MD Lorrie   levothyroxine (SYNTHROID) 50 MCG tablet Take 1 tablet by mouth daily 16   Minus MD Lorrie   carvedilol (COREG) 12.5 MG tablet Take 1 tablet by mouth 2 times daily (with meals) Not sure if using 16   Minus MD Lorrie   lisinopril-hydrochlorothiazide (PRINZIDE;ZESTORETIC) 20-25 MG per tablet Take 1 tablet by mouth daily /16   Minus MD Lorrie   metFORMIN (GLUCOPHAGE) 1000 MG tablet Take 1 tablet by mouth 2 times daily (with meals) 16   Minus MD Lorrie   glucose monitoring kit (FREESTYLE) monitoring kit 1 kit by Does not apply route daily as needed (monitor blood glucose levels) 16   Chanda Cordova MD   Glucose Blood (BLOOD GLUCOSE TEST STRIPS) STRP Check blood sugars three times daily 16   Chanda Cordova MD   benzocaine-menthol (CEPACOL SORE THROAT EX ST) 15-3.6 MG lozenge Take 1 lozenge by mouth 3 times daily as needed for Sore Throat 16   Nakia Milks, DO   ibuprofen (ADVIL;MOTRIN) 800 MG tablet Take 1 tablet by mouth every 8 hours as needed for Pain 16   Yazan Dull, DO   aspirin 81 MG chewable tablet Take 1 tablet by mouth daily. 3/20/15   Worthy Cooks, MD   gabapentin (NEURONTIN) 300 MG capsule Take 1 capsule by mouth 3 times daily. 3/20/15   Worthy Cooks, MD   simvastatin (ZOCOR) 20 MG tablet Take 1 tablet by mouth nightly.  3/20/15   Worthy Cooks, MD   nystatin (MYCOSTATIN) 269168 UNIT/GM powder Apply topically 4 times daily.  3/20/15   Connor Donaldson MD   Blood Glucose Monitoring Suppl (BLOOD GLUCOSE METER) KIT Test 3 times daily Dx: 250.00  Diabetes Mellitus Non-Insulin Dependent 3/20/15   Connor Donaldson MD   Blood Pressure KIT Use as directed 3/20/15   Connor Donaldson MD       Current medications:    Current Facility-Administered Medications   Medication Dose Route Frequency Provider Last Rate Last Dose    [MAR Hold] lidocaine 1 % injection 10 mL  10 mL Intradermal Once Geovany Chong DPM        [facility] Hold] morphine (PF) injection 2 mg  2 mg Intravenous Q4H PRN Lauren Chong DPM        [facility] Hold] ceftaroline fosamil (TEFLARO) 600 mg in dextrose 5 % 50 mL IVPB  600 mg Intravenous Q12H Tiffany Quiles MD        [facility] Hold] HYDROcodone-acetaminophen (NORCO) 5-325 MG per tablet 2 tablet  2 tablet Oral Q3H PRN Mbonu York Claude, MD   2 tablet at 01/06/20 0847    [MAR Hold] miconazole (MICOTIN) 2 % powder   Topical BID KAYLEE Schilling CNP        [facility] Hold] sodium chloride flush 0.9 % injection 10 mL  10 mL Intravenous BID Jeanne Javed DPM   10 mL at 01/06/20 0824    [MAR Hold] diphenhydrAMINE (BENADRYL) tablet 25 mg  25 mg Oral Q6H PRN Tessa Robles MD   25 mg at 01/04/20 1754    [MAR Hold] carvedilol (COREG) tablet 12.5 mg  12.5 mg Oral BID  Lorrie Soliz PA-C   12.5 mg at 01/06/20 0824    [MAR Hold] sodium chloride flush 0.9 % injection 10 mL  10 mL Intravenous PRN Adriana Zabala PA-C        [facility] Hold] 0.9 % sodium chloride infusion   Intravenous Continuous KAYLEE Rand CNP 75 mL/hr at 01/05/20 1156      [MAR Hold] sodium chloride flush 0.9 % injection 10 mL  10 mL Intravenous PRN KAYLEE Rand CNP        [facility] Hold] potassium chloride (KLOR-CON M) extended release tablet 40 mEq  40 mEq Oral PRN KAYLEE Rand CNP        Or   [facility] Hold] potassium bicarb-citric acid (EFFER-K) effervescent tablet 40 cerebral, . Anesthesia Plan      MAC     ASA 4     (Asa 4)  Induction: intravenous.                           Elizabeth Nevarez MD   1/6/2020

## 2020-01-07 LAB
-: NORMAL
ABSOLUTE EOS #: 0.27 K/UL (ref 0–0.44)
ABSOLUTE IMMATURE GRANULOCYTE: 0.03 K/UL (ref 0–0.3)
ABSOLUTE LYMPH #: 1.51 K/UL (ref 1.1–3.7)
ABSOLUTE MONO #: 0.78 K/UL (ref 0.1–1.2)
AMORPHOUS: NORMAL
ANION GAP SERPL CALCULATED.3IONS-SCNC: 13 MMOL/L (ref 9–17)
BACTERIA: NORMAL
BASOPHILS # BLD: 0 % (ref 0–2)
BASOPHILS ABSOLUTE: 0.03 K/UL (ref 0–0.2)
BILIRUBIN URINE: NEGATIVE
BUN BLDV-MCNC: 23 MG/DL (ref 6–20)
BUN/CREAT BLD: ABNORMAL (ref 9–20)
C-REACTIVE PROTEIN: 331.7 MG/L (ref 0–5)
CALCIUM SERPL-MCNC: 7.9 MG/DL (ref 8.6–10.4)
CASTS UA: NORMAL /LPF (ref 0–8)
CHLORIDE BLD-SCNC: 99 MMOL/L (ref 98–107)
CO2: 21 MMOL/L (ref 20–31)
COLOR: YELLOW
COMMENT UA: ABNORMAL
CREAT SERPL-MCNC: 1.31 MG/DL (ref 0.5–0.9)
CREATININE URINE: 59.9 MG/DL (ref 28–217)
CRYSTALS, UA: NORMAL /HPF
CULTURE: ABNORMAL
DIFFERENTIAL TYPE: ABNORMAL
DIRECT EXAM: ABNORMAL
EOSINOPHILS RELATIVE PERCENT: 3 % (ref 1–4)
EPITHELIAL CELLS UA: NORMAL /HPF (ref 0–5)
GFR AFRICAN AMERICAN: 53 ML/MIN
GFR NON-AFRICAN AMERICAN: 44 ML/MIN
GFR SERPL CREATININE-BSD FRML MDRD: ABNORMAL ML/MIN/{1.73_M2}
GFR SERPL CREATININE-BSD FRML MDRD: ABNORMAL ML/MIN/{1.73_M2}
GLUCOSE BLD-MCNC: 155 MG/DL (ref 65–105)
GLUCOSE BLD-MCNC: 243 MG/DL (ref 65–105)
GLUCOSE BLD-MCNC: 247 MG/DL (ref 65–105)
GLUCOSE BLD-MCNC: 260 MG/DL (ref 70–99)
GLUCOSE BLD-MCNC: 280 MG/DL (ref 65–105)
GLUCOSE URINE: ABNORMAL
HCT VFR BLD CALC: 29.3 % (ref 36.3–47.1)
HEMOGLOBIN: 9.1 G/DL (ref 11.9–15.1)
IMMATURE GRANULOCYTES: 0 %
INTERVENTION: NORMAL
KETONES, URINE: NEGATIVE
LEUKOCYTE ESTERASE, URINE: NEGATIVE
LYMPHOCYTES # BLD: 15 % (ref 24–43)
Lab: ABNORMAL
MCH RBC QN AUTO: 30 PG (ref 25.2–33.5)
MCHC RBC AUTO-ENTMCNC: 31.1 G/DL (ref 28.4–34.8)
MCV RBC AUTO: 96.7 FL (ref 82.6–102.9)
MONOCYTES # BLD: 8 % (ref 3–12)
MUCUS: NORMAL
NITRITE, URINE: NEGATIVE
NRBC AUTOMATED: 0 PER 100 WBC
OTHER OBSERVATIONS UA: NORMAL
PDW BLD-RTO: 13.5 % (ref 11.8–14.4)
PH UA: 5.5 (ref 5–8)
PLATELET # BLD: 310 K/UL (ref 138–453)
PLATELET ESTIMATE: ABNORMAL
PMV BLD AUTO: 10.7 FL (ref 8.1–13.5)
POTASSIUM SERPL-SCNC: 3.3 MMOL/L (ref 3.7–5.3)
PROTEIN UA: ABNORMAL
RBC # BLD: 3.03 M/UL (ref 3.95–5.11)
RBC # BLD: ABNORMAL 10*6/UL
RBC UA: NORMAL /HPF (ref 0–4)
RENAL EPITHELIAL, UA: NORMAL /HPF
SEG NEUTROPHILS: 74 % (ref 36–65)
SEGMENTED NEUTROPHILS ABSOLUTE COUNT: 7.36 K/UL (ref 1.5–8.1)
SODIUM BLD-SCNC: 133 MMOL/L (ref 135–144)
SPECIFIC GRAVITY UA: 1.02 (ref 1–1.03)
SPECIMEN DESCRIPTION: ABNORMAL
TRICHOMONAS: NORMAL
TURBIDITY: ABNORMAL
URINE HGB: ABNORMAL
UROBILINOGEN, URINE: NORMAL
WBC # BLD: 10 K/UL (ref 3.5–11.3)
WBC # BLD: ABNORMAL 10*3/UL
WBC UA: NORMAL /HPF (ref 0–5)
YEAST: NORMAL

## 2020-01-07 PROCEDURE — 85025 COMPLETE CBC W/AUTO DIFF WBC: CPT

## 2020-01-07 PROCEDURE — 36415 COLL VENOUS BLD VENIPUNCTURE: CPT

## 2020-01-07 PROCEDURE — 6370000000 HC RX 637 (ALT 250 FOR IP): Performed by: STUDENT IN AN ORGANIZED HEALTH CARE EDUCATION/TRAINING PROGRAM

## 2020-01-07 PROCEDURE — 99232 SBSQ HOSP IP/OBS MODERATE 35: CPT | Performed by: INTERNAL MEDICINE

## 2020-01-07 PROCEDURE — 6370000000 HC RX 637 (ALT 250 FOR IP): Performed by: NURSE PRACTITIONER

## 2020-01-07 PROCEDURE — 81001 URINALYSIS AUTO W/SCOPE: CPT

## 2020-01-07 PROCEDURE — 2580000003 HC RX 258: Performed by: STUDENT IN AN ORGANIZED HEALTH CARE EDUCATION/TRAINING PROGRAM

## 2020-01-07 PROCEDURE — 1200000000 HC SEMI PRIVATE

## 2020-01-07 PROCEDURE — 82570 ASSAY OF URINE CREATININE: CPT

## 2020-01-07 PROCEDURE — 6360000002 HC RX W HCPCS: Performed by: STUDENT IN AN ORGANIZED HEALTH CARE EDUCATION/TRAINING PROGRAM

## 2020-01-07 PROCEDURE — 86140 C-REACTIVE PROTEIN: CPT

## 2020-01-07 PROCEDURE — 82947 ASSAY GLUCOSE BLOOD QUANT: CPT

## 2020-01-07 PROCEDURE — 80048 BASIC METABOLIC PNL TOTAL CA: CPT

## 2020-01-07 RX ORDER — TIZANIDINE 4 MG/1
4 TABLET ORAL EVERY 6 HOURS PRN
Status: DISCONTINUED | OUTPATIENT
Start: 2020-01-07 | End: 2020-01-12 | Stop reason: HOSPADM

## 2020-01-07 RX ADMIN — CEFTAROLINE FOSAMIL 600 MG: 600 POWDER, FOR SOLUTION INTRAVENOUS at 03:44

## 2020-01-07 RX ADMIN — HYDROCODONE BITARTRATE AND ACETAMINOPHEN 2 TABLET: 5; 325 TABLET ORAL at 03:44

## 2020-01-07 RX ADMIN — TIZANIDINE 4 MG: 4 TABLET ORAL at 21:23

## 2020-01-07 RX ADMIN — MORPHINE SULFATE 2 MG: 2 INJECTION, SOLUTION INTRAMUSCULAR; INTRAVENOUS at 18:33

## 2020-01-07 RX ADMIN — INSULIN GLARGINE 40 UNITS: 100 INJECTION, SOLUTION SUBCUTANEOUS at 21:28

## 2020-01-07 RX ADMIN — APIXABAN 2.5 MG: 2.5 TABLET, FILM COATED ORAL at 21:23

## 2020-01-07 RX ADMIN — CEFTAROLINE FOSAMIL 600 MG: 600 POWDER, FOR SOLUTION INTRAVENOUS at 17:37

## 2020-01-07 RX ADMIN — MORPHINE SULFATE 2 MG: 2 INJECTION, SOLUTION INTRAMUSCULAR; INTRAVENOUS at 14:05

## 2020-01-07 RX ADMIN — SODIUM CHLORIDE, PRESERVATIVE FREE 10 ML: 5 INJECTION INTRAVENOUS at 08:45

## 2020-01-07 RX ADMIN — SIMVASTATIN 20 MG: 20 TABLET, FILM COATED ORAL at 21:23

## 2020-01-07 RX ADMIN — HYDROCODONE BITARTRATE AND ACETAMINOPHEN 2 TABLET: 5; 325 TABLET ORAL at 11:43

## 2020-01-07 RX ADMIN — INSULIN LISPRO 3 UNITS: 100 INJECTION, SOLUTION INTRAVENOUS; SUBCUTANEOUS at 17:37

## 2020-01-07 RX ADMIN — HYDROCODONE BITARTRATE AND ACETAMINOPHEN 2 TABLET: 5; 325 TABLET ORAL at 19:26

## 2020-01-07 RX ADMIN — HYDROCODONE BITARTRATE AND ACETAMINOPHEN 2 TABLET: 5; 325 TABLET ORAL at 06:55

## 2020-01-07 RX ADMIN — POTASSIUM CHLORIDE 40 MEQ: 1500 TABLET, EXTENDED RELEASE ORAL at 08:55

## 2020-01-07 RX ADMIN — Medication 10 ML: at 02:23

## 2020-01-07 RX ADMIN — HYDROCODONE BITARTRATE AND ACETAMINOPHEN 2 TABLET: 5; 325 TABLET ORAL at 16:24

## 2020-01-07 RX ADMIN — CARVEDILOL 12.5 MG: 12.5 TABLET, FILM COATED ORAL at 17:35

## 2020-01-07 RX ADMIN — CARVEDILOL 12.5 MG: 12.5 TABLET, FILM COATED ORAL at 08:44

## 2020-01-07 RX ADMIN — INSULIN LISPRO 6 UNITS: 100 INJECTION, SOLUTION INTRAVENOUS; SUBCUTANEOUS at 08:45

## 2020-01-07 RX ADMIN — ANTI-FUNGAL POWDER MICONAZOLE NITRATE TALC FREE: 1.42 POWDER TOPICAL at 21:28

## 2020-01-07 RX ADMIN — ONDANSETRON 4 MG: 2 INJECTION INTRAMUSCULAR; INTRAVENOUS at 14:06

## 2020-01-07 RX ADMIN — TIZANIDINE 4 MG: 4 TABLET ORAL at 17:35

## 2020-01-07 RX ADMIN — INSULIN LISPRO 6 UNITS: 100 INJECTION, SOLUTION INTRAVENOUS; SUBCUTANEOUS at 11:39

## 2020-01-07 RX ADMIN — MORPHINE SULFATE 2 MG: 2 INJECTION, SOLUTION INTRAMUSCULAR; INTRAVENOUS at 23:30

## 2020-01-07 RX ADMIN — TIZANIDINE 4 MG: 4 TABLET ORAL at 03:10

## 2020-01-07 RX ADMIN — MORPHINE SULFATE 2 MG: 2 INJECTION, SOLUTION INTRAMUSCULAR; INTRAVENOUS at 02:23

## 2020-01-07 RX ADMIN — MORPHINE SULFATE 2 MG: 2 INJECTION, SOLUTION INTRAMUSCULAR; INTRAVENOUS at 08:44

## 2020-01-07 RX ADMIN — LEVOTHYROXINE SODIUM 50 MCG: 100 TABLET ORAL at 08:44

## 2020-01-07 RX ADMIN — INSULIN LISPRO 5 UNITS: 100 INJECTION, SOLUTION INTRAVENOUS; SUBCUTANEOUS at 21:22

## 2020-01-07 RX ADMIN — ANTI-FUNGAL POWDER MICONAZOLE NITRATE TALC FREE: 1.42 POWDER TOPICAL at 08:44

## 2020-01-07 RX ADMIN — APIXABAN 2.5 MG: 2.5 TABLET, FILM COATED ORAL at 08:52

## 2020-01-07 RX ADMIN — ASPIRIN 81 MG 81 MG: 81 TABLET ORAL at 08:52

## 2020-01-07 RX ADMIN — SODIUM CHLORIDE: 9 INJECTION, SOLUTION INTRAVENOUS at 11:43

## 2020-01-07 ASSESSMENT — PAIN SCALES - GENERAL
PAINLEVEL_OUTOF10: 7
PAINLEVEL_OUTOF10: 7
PAINLEVEL_OUTOF10: 10
PAINLEVEL_OUTOF10: 9
PAINLEVEL_OUTOF10: 7
PAINLEVEL_OUTOF10: 4
PAINLEVEL_OUTOF10: 6
PAINLEVEL_OUTOF10: 8
PAINLEVEL_OUTOF10: 10
PAINLEVEL_OUTOF10: 6

## 2020-01-07 NOTE — ANESTHESIA POSTPROCEDURE EVALUATION
Department of Anesthesiology  Postprocedure Note    Patient: Ramu Aguirre  MRN: [id number]  YOB: 1975  Date of evaluation: 1/6/2020  Time:  9:37 PM     Procedure Summary     Date:  01/06/20 [place] / [place]  [facility] [place] [facility] [place]    Anesthesia Start:  [address] Anesthesia Stop:  1817    Procedure:  INCISION AND DRAINAGE RIGHT FOOT, HALLUX AMPUTATION (Right ) Diagnosis:  (ADD-ON)    Surgeon:  Vadim Cota DPM Responsible Provider:  Abhilash Molina MD    Anesthesia Type:  MAC ASA Status:  4          Anesthesia Type: MAC    Rodriguez Phase I:      Rodriguez Phase II: Rodriguez Score: 10    Last vitals: Reviewed and per EMR flowsheets.        Anesthesia Post Evaluation    Patient location during evaluation: PACU  Patient participation: complete - patient participated  Level of consciousness: awake and alert  Pain score: 0  Nausea & Vomiting: no vomiting  Cardiovascular status: hemodynamically stable  Respiratory status: room air  Hydration status: euvolemic

## 2020-01-07 NOTE — PROGRESS NOTES
of breath, wheezing  Cardiovascular:  negative for chest pain, chest pressure/discomfort, lower extremity edema, palpitations  Gastrointestinal:  negative for abdominal pain, constipation, diarrhea, nausea, vomiting  Extremities: reports right foot pain  Neurological:  negative for dizziness, headache    Medications: Allergies:  No Known Allergies    Current Meds:   Scheduled Meds:    ceftaroline fosamil (TEFLARO) IVPB  600 mg Intravenous Q12H    miconazole   Topical BID    sodium chloride flush  10 mL Intravenous BID    carvedilol  12.5 mg Oral BID WC    insulin glargine  40 Units Subcutaneous Nightly    aspirin  81 mg Oral Daily    levothyroxine  50 mcg Oral Daily    simvastatin  20 mg Oral Nightly    apixaban  2.5 mg Oral BID    insulin lispro  0-18 Units Subcutaneous TID WC    insulin lispro  0-9 Units Subcutaneous Nightly     Continuous Infusions:    sodium chloride 75 mL/hr at 01/07/20 1143    dextrose       PRN Meds: tiZANidine, morphine, HYDROcodone 5 mg - acetaminophen, diphenhydrAMINE, sodium chloride flush, sodium chloride flush, potassium chloride **OR** potassium alternative oral replacement **OR** potassium chloride, magnesium sulfate, magnesium hydroxide, nicotine, ondansetron, acetaminophen, glucose, dextrose, glucagon (rDNA), dextrose    Data:     Past Medical History:   has a past medical history of Hyperlipidemia, Hypertension, Hypothyroidism, Type II or unspecified type diabetes mellitus without mention of complication, not stated as uncontrolled, and Urinary incontinence. Social History:   reports that she has quit smoking. She smoked 0.10 packs per day. She does not have any smokeless tobacco history on file. She reports that she does not drink alcohol or use drugs.      Family History:   Family History   Problem Relation Age of Onset    Stroke Father     Heart Disease Other        Vitals:  /74   Pulse 65   Temp 97.4 °F (36.3 °C) (Oral)   Resp 18   Ht 5' 1\" (1.549 m)   Wt 298 lb 9.6 oz (135.4 kg)   SpO2 98%   BMI 56.42 kg/m²   Temp (24hrs), Av.4 °F (36.9 °C), Min:97.4 °F (36.3 °C), Max:99.5 °F (37.5 °C)    Recent Labs     20  1634   POCGLU 212* 243* 247* 155*       I/O (24Hr): Intake/Output Summary (Last 24 hours) at 2020 1835  Last data filed at 2020 0845  Gross per 24 hour   Intake 10 ml   Output --   Net 10 ml       Labs:  Hematology:  Recent Labs     20  1300 20  03520  0501   WBC 11.3 11.5* 10.0   RBC 3.92* 3.17* 3.03*   HGB 11.6* 9.5* 9.1*   HCT 36.8 31.2* 29.3*   MCV 93.9 98.4 96.7   MCH 29.6 30.0 30.0   MCHC 31.5 30.4 31.1   RDW 13.2 13.3 13.5    277 310   MPV 10.7 10.7 10.7   .9* 257.5* 331.7*     Chemistry:  Recent Labs     20  0501 20  03520  0501   NA  --   --  133*   K  --   --  3.3*   CL  --   --  99   CO2  --   --  21   GLUCOSE  --   --  260*   BUN 19 22* 23*   CREATININE 1.28* 1.41* 1.31*   ANIONGAP  --   --  13   LABGLOM 45* 40* 44*   GFRAA 55* 49* 53*   CALCIUM  --   --  7.9*     Recent Labs     20  1222 20  1829 20  1634   POCGLU 218* 237* 212* 243* 247* 155*     ABG:  Lab Results   Component Value Date    FIO2 INFORMATION NOT PROVIDED 2015     Lab Results   Component Value Date/Time    SPECIAL NOT REPORTED 2019 12:22 PM    SPECIAL NOT REPORTED 2019 12:22 PM     Lab Results   Component Value Date/Time    CULTURE DUPLICATE ORDER  12:22 PM       Radiology:  Saroj Cantu Foot Right (min 3 Views)    Result Date: 2020  1. Soft tissue swelling and gas in the great toe, extending to the level of the 1st metatarsal head, concerning for gas gangrene. 2.  No radiographic evidence of osteomyelitis. Us Renal Complete    Result Date: 2020  Unremarkable ultrasound of the kidneys and urinary bladder.      Mri Foot Right W Wo Contrast    Result Date: 1/2/2020  1. No evidence of osteomyelitis in the right forefoot. 2. Focal confluent soft tissue edema at the plantar aspect of the proximal great toe, possibly focal cellulitis or phlegmon. No definite organized fluid collection to indicate abscess at this time. This area measures approximately 1.9 x 0.8 x 2.1 cm. 3. A couple low signal foci are seen within the plantar soft tissues of the proximal great toe, favored to be vascular structures but possibility of soft tissue gas is not entirely excluded. 4. Small joint effusion at the 1st MTP joint. Physical Examination:        General appearance:  alert, cooperative and no distress  Mental Status:  oriented to person, place and time and normal affect  Lungs:  clear to auscultation bilaterally, normal effort  Heart:  regular rate and rhythm, no murmur  Abdomen:  soft, nontender, nondistended, normal bowel sounds, no masses, hepatomegaly, splenomegaly  Extremities:  no edema, redness, tenderness in the calves; right foot currently wrapped in gauze and ACE-bandage; erythema extending up to mid forefoot  Skin: no major lesions outside of the above noted right great toe/forefoot    Assessment:        Hospital Problems           Last Modified POA    * (Principal) Cellulitis of right foot 1/2/2020 Yes    Hypertension 12/31/2019 Yes    Morbid obesity due to excess calories ([address] 12/31/2019 Yes    Essential hypertension 12/31/2019 Yes    Hypothyroidism 12/31/2019 Yes    Diabetic polyneuropathy ([address] 12/31/2019 Yes    Type 2 diabetes mellitus with neurologic complication, with long-term current use of insulin ([address] 12/31/2019 Yes    Chronic diastolic congestive heart failure ([address] 12/31/2019 Yes    CAROLYN (acute kidney injury) ([address] 1/2/2020 Yes    Cellulitis of foot 1/6/2020 Yes    Type 2 diabetes mellitus with right diabetic foot infection ([address] 1/6/2020 Yes    Septic arthritis of right foot ([address] 1/6/2020 Yes          Plan:        1.  Appreciate podiatry and infectious

## 2020-01-07 NOTE — OP NOTE
strap across the lap. After adequate sedation by the Anesthesia, a local block of 10cc of a 1:1 mixture of 1% lidocaine plain and 0.5% Marcaine plain was injected. The foot was then prepped and draped in the usual aseptic fashion. Attention was directed to the first interspace. A #15 blade was used to make a longitudinal incision into the webspace. Necrotic, nonviable tissue was found and purulence expressed from the first MTPJ, indicative of a septic joint. The hallux was found to be nonviable. A 15 blade was used to create 2 converging semielliptical incisions around the hallux. Hallux was disarticulated at the MTPJ and passed from the table as specimen. .  right hallux. A hemostat was used to dilate the soft tissues from initial area of incision and the sinus tract was found laterally to the level of the fifth metatarsal head. Devitalized and nonviable tissue was found throughout the dorsal aspect of the forefoot. There was minimal bleeding appreciated. 3 L of normal saline via pulse lavage was then used to irrigate surgical site. Surgical site was left open and packed with half-inch iodoform packing, 4 x 4, Fabien Chua. The patient tolerated the above procedure and anesthesia well without complications. The patient was transported from the operating room to the PACU with vital signs stable and neurovascularly intact to the right foot. Patient was then transferred back to the floor.       Electronically signed by Mayra Skiff, DPM on 1/7/2020 at 4:30 PM

## 2020-01-07 NOTE — PROGRESS NOTES
Av, Min:54, Max:144      Physical Examination:       Physical Exam  Vitals signs and nursing note reviewed. Constitutional:       General: She is not in acute distress. Appearance: Normal appearance. She is obese. She is not ill-appearing. HENT:      Head: Normocephalic. Nose: Nose normal.      Mouth/Throat:      Mouth: Mucous membranes are moist.   Cardiovascular:      Rate and Rhythm: Normal rate and regular rhythm. Heart sounds: No murmur. Pulmonary:      Effort: Pulmonary effort is normal. No respiratory distress. Breath sounds: Normal breath sounds. Abdominal:      Palpations: Abdomen is soft. Tenderness: There is no tenderness. Musculoskeletal:      Right lower leg: No edema. Left lower leg: No edema. Skin:     General: Skin is warm and dry. Comments: Right foot ace wrapped. Neurological:      Mental Status: She is alert and oriented to person, place, and time.        Labs:       Recent Labs     20  1300 20  0350 20  0501   WBC 11.3 11.5* 10.0   RBC 3.92* 3.17* 3.03*   HGB 11.6* 9.5* 9.1*   HCT 36.8 31.2* 29.3*   MCV 93.9 98.4 96.7   MCH 29.6 30.0 30.0   MCHC 31.5 30.4 31.1   RDW 13.2 13.3 13.5    277 310   MPV 10.7 10.7 10.7      BMP:   Recent Labs     20  0501 20  0350 20  0501   NA  --   --  133*   K  --   --  3.3*   CL  --   --  99   CO2  --   --  21   BUN 19 22* 23*   CREATININE 1.28* 1.41* 1.31*   GLUCOSE  --   --  260*   CALCIUM  --   --  7.9*     SPEP:  Lab Results   Component Value Date    PROT 8.2 2015     C3:     Lab Results   Component Value Date    C3 182 2020     C4:     Lab Results   Component Value Date    C4 48 2020     Hep BsAg:         Lab Results   Component Value Date    HEPBSAG NONREACTIVE 2020     Hep C AB:          Lab Results   Component Value Date    HEPCAB NONREACTIVE 2020       Urinalysis/Chemistries:      Lab Results   Component Value Date    NITRU NEGATIVE 01/07/2020    COLORU YELLOW 01/07/2020    PHUR 5.5 01/07/2020    WBCUA 5 TO 10 01/07/2020    RBCUA 20 TO 50 01/07/2020    MUCUS NOT REPORTED 01/07/2020    TRICHOMONAS NOT REPORTED 01/07/2020    YEAST NOT REPORTED 01/07/2020    BACTERIA NOT REPORTED 01/07/2020    SPECGRAV 1.018 01/07/2020    LEUKOCYTESUR NEGATIVE 01/07/2020    UROBILINOGEN Normal 01/07/2020    BILIRUBINUR NEGATIVE 01/07/2020    GLUCOSEU 2+ 01/07/2020    KETUA NEGATIVE 01/07/2020    AMORPHOUS NOT REPORTED 01/07/2020      Urine Creatinine:     Lab Results   Component Value Date    LABCREA 59.9 01/07/2020     Radiology:     Reviewed as available    Assessment:     1. Acute Kidney Injury nonoliguric secondary to PreRenal injury in the setting of right foot infection treated with vancomycin. Creatinine trending down today with hydration. 2. Right great toe diabetic infection/gas formation in tissues status post incision and drainage per podiatry on December 31 2020 and incision and drainage with hallux amputation January 6 2020.   3. Diabetes Mellitus type II  4. Essential Hypertension  5. Coronary Artery Disease status post PCI in the past.     Plan:   1. Continue IV hydration   2. Avoid Nephrotoxic agents - Hold Metformin and ACE inhibitors. 3. Antibiotics per eGFR. 4. Since the renal function is improving appropriately, nephrology will sign off. Please call with any other questions. Patient's nurse was updated. Nutrition   Avoid nephrotoxic drugs/contrast exposure. We will continue to follow this patient along with you. Ramez Boswell DO  PGY-1, Internal medicine resident  [place], [place]  1/7/2020 11:49 AM    Attending Physician Statement  I have discussed the care of this patient, including pertinent history and exam findings, with the Resident/CNP. I have reviewed and edited the key elements of all parts of the encounter with the Resident/CNP.   I agree with the assessment, plan and orders as

## 2020-01-07 NOTE — PROGRESS NOTES
in the chart those procedures where I was not present during the key portions. I have reviewed the Podiatry Resident progress note. I agree with the chief complaint, past medical history, past surgical history, allergies, medications, social and family history as documented unless otherwise noted below. Documentation of the HPI, Physical Exam and Medical Decision Making performed by medical students or scribes is based on my personal performance of the HPI, PE and MDM. I have personally evaluated this patient and have completed at least one if not all key elements of the E/M (history, physical exam, and MDM). Additional findings are as noted.      Aguilar Pierre DPM on 1/8/2020 at 78:44 PM  Board Certified, American Board of Podiatric Surgery  Fellow, [employer] [facility] and [facility]

## 2020-01-07 NOTE — CARE COORDINATION
Transitional planning, talked with patient. Plan for AMP this week. Plans on going home. Wanting PO antibiotics, unable to get to infusion center for IV antibiotics. Plans on going to [place] immediately after discharge.

## 2020-01-07 NOTE — PROGRESS NOTES
[facility] - Progress Note    Today's Date and Time: 1/7/2020 , 12:42 PM    Impression :   · Rt great toe diabetic infection  · Foot cellulitis  · Gas formation in tissues  · S/P I&D per Podiatry 12-31  · DM II not controlled  · CAD s/p stenting x 2  · Non compliance    Recommendations:     · D/C Ceftriaxone 2 gm IV q 24 hr.   · Ceftaroline 600 mg IV  q 12 hr, pending exclusion of MRSA. Stop date 1-15-20  · Continue elevation of the leg and foot with 3-4 pillows. · Kerlex and ACE wraps to the foot and ankle. · Diflucan 200 mg po daily  · Surgical intervention as per Podiatry. Medical Decision Making/Summary/Discussion:1/7/2020     ·   Infection Control Recommendations   · [place] Precautions    Antimicrobial Stewardship Recommendations     · Targeted therapy  · Restricted medication    Coordination of Outpatient Care:   · Estimated Length of IV antimicrobials: 1-15-20  · Patient will need Midline Catheter Insertion:  No  · Patient will need PICC line Insertion: No  · Patient will need: Home IV , [place],  SNF,  LTAC: No  · Patient will need outpatient wound care: TBD    Chief complaint/reason for consultation:   · Diabetic foot infection, uncontrolled DM      History of Present Illness:   Shantell Angel is a 38y.o.-year-old  female who was initially admitted on 12/30/2019. Patient seen at the request of Dr. Steve Rojas:    Pt is a 37 yo woman visiting from [place]. She noted some Rt great toe pain on 12-24. By 12-30 the pain had progressed and her foot was swollen and tender so she presented to the ED for further evaluation. Pt also has a history of CAD with multiple stents in place, PE and IDDM. She reports that she lost her insurance 6 months ago and has not been taking her Brilinta, Eliquis or Insulin as a result. She denies any chills, fevers, night sweats or malaise prior to presenting to the ED.     In the ED she was found BMP:   Recent Labs     01/06/20  0350 01/07/20  0501   NA  --  133*   K  --  3.3*   CL  --  99   CO2  --  21   BUN 22* 23*   CREATININE 1.41* 1.31*     Hepatic Function Panel: No results for input(s): PROT, LABALBU, BILIDIR, IBILI, BILITOT, ALKPHOS, ALT, AST in the last 72 hours. No results for input(s): RPR in the last 72 hours. No results for input(s): HIV in the last 72 hours. No results for input(s): BC in the last 72 hours.   Lab Results   Component Value Date    MUCUS NOT REPORTED 01/07/2020    RBC 3.03 01/07/2020    TRICHOMONAS NOT REPORTED 01/07/2020    WBC 10.0 01/07/2020    YEAST NOT REPORTED 01/07/2020    TURBIDITY TURBID 01/07/2020     Lab Results   Component Value Date    CREATININE 1.31 01/07/2020    GLUCOSE 260 01/07/2020       Medical Decision Making-Imaging:     EXAMINATION:   MRI OF THE RIGHT HINDFOOT WITHOUT AND WITH CONTRAST, 12/31/2019 4:22 pm       TECHNIQUE:   Multiplanar multisequence MRI of the right foot was performed without and   with the administration of intravenous contrast.       COMPARISON:   Right foot radiographs 12/30/2019.       HISTORY:   ORDERING SYSTEM PROVIDED HISTORY: right foot wound   TECHNOLOGIST PROVIDED HISTORY:   right foot wound   What is the area of interest?->Forefoot   Is the patient pregnant?->No       The patient reported to the MRI tech that she had complaints involving the   heel and midfoot so a hindfoot study was performed.  The clinician has   requested that the study be repeated as a forefoot.       FINDINGS:   PLANTAR FASCIA: Mild chronic thickening of the central cord of the plantar   fascia.  No acute plantar fasciitis or plantar fascia tear.  Severe fatty   atrophy of the abductor digiti minimi muscle.       ACHILLES TENDON: The Achilles tendon is normal in position, morphology and   signal.  No associated bursitis.       BONE MARROW: No fracture or dislocation.  No abnormal marrow space-occupying   lesion.  No osseous erosion.       JOINT SPACES: ORDERING SYSTEM PROVIDED HISTORY: Wound   TECHNOLOGIST PROVIDED HISTORY:   Wound   What is the area of interest?->Forefoot   Is the patient pregnant?->No       Wound at the plantar aspect of the great toe.       FINDINGS:   There is mild subcutaneous edema in the dorsal and plantar forefoot.  Focal   confluent soft tissue edema measuring approximately 1.9 x 0.8 x 2.1 cm is   seen in the soft tissues at the plantar aspect of the proximal great toe,   superficial to the flexor tendon, adjacent to the proximal phalanx.  There is   appears to be intervening fat on the T1 weighted sequences.  No definite   organized fluid collection identified in the soft tissues.  No abnormal   enhancement.  There are a couple low signal foci within the plantar soft   tissues of the great toe, somewhat linear appearance, suggesting a vascular   structure, but possibility of soft tissue gas is not excluded. [place] Castor is a   small effusion at the 1st MTP joint.  No marrow signal abnormality.       There is a small amount of fluid in the 1st and 3rd intermetatarsal bursa. No abnormal intermetatarsal enhancing mass.       There is mild increased signal within the intrinsic muscles of the foot. There is mild scattered fatty atrophy of the intrinsic muscles.       The visualized portions of the flexor and extensor tendons are intact.           Impression   1. No evidence of osteomyelitis in the right forefoot. 2. Focal confluent soft tissue edema at the plantar aspect of the proximal   great toe, possibly focal cellulitis or phlegmon.  No definite organized   fluid collection to indicate abscess at this time.  This area measures   approximately 1.9 x 0.8 x 2.1 cm.   3. A couple low signal foci are seen within the plantar soft tissues of the   proximal great toe, favored to be vascular structures but possibility of soft   tissue gas is not entirely excluded. 4. Small joint effusion at the 1st MTP joint.          Medical Decision Culture Abnormal  12/31/2019 12:21 PM [employer]   This is a mixed culture of organisms, which may represent colonization or contamination.  Notify the laboratory within 7 days for further workup.  Susceptibilities have not been performed. Culture Abnormal  12/31/2019 12:21  [address] ([facility]IVIUS) BIVIA LIGHT GROWTH BETA LACTAMASE POSITIVE    Testing Performed By     1/5/2020  3:40 PM - Sammy, Ruben Incoming Lab Results From [employer]     Specimen Information: Foot        Component Collected Lab   Specimen Description 12/31/2019 12:21 PM [employer]   . FOOT RIGHT    Special Requests 12/31/2019 12:21 PM [employer]   NOT REPORTED    Direct Exam 12/31/2019 12:21 PM [employer]   NO NEUTROPHILS SEEN    Direct Exam Abnormal  12/31/2019 12:21 PM [address] [place]    Direct Exam Abnormal  12/31/2019 12:21 PM [address]    Culture Abnormal  12/31/2019 12:21 PM [address] MODERATE GROWTH    Culture Abnormal  12/31/2019 12:21 PM [address]    Culture Abnormal  12/31/2019 12:21 PM [place]    Culture 12/31/2019 12:21  [address]    Culture Abnormal  12/31/2019 12:21 PM [employer]   This is a mixed culture of organisms, which may represent colonization or contamination.  Notify the laboratory within 7 days for further workup.  Susceptibilities have not been performed.    Culture Abnormal  12/31/2019 12:21  [address] ([facility]ERO[employer]IVIUS) BIVIA LIGHT GROWTH BETA LACTAMASE POSITIVE    Testing Performed By       Medical Decision Making-Other:     Note:  · Labs, medications, radiologic studies were reviewed with personal review of films  · Moderate Large amounts of data were reviewed  · Discussed with nursing Staff, Discharge planner  · Infection Control and Prevention measures reviewed  · All prior entries were reviewed  · Administer medications as ordered  · Prognosis: Fair  · Discharge planning reviewed  · Follow up as outpatient. Thank you for allowing us to participate in the care of this patient. Please call with questions.     Lynnea Severe, MD  Pager: [phone] - Office: [phone]

## 2020-01-08 PROBLEM — R80.8 OTHER PROTEINURIA: Status: ACTIVE | Noted: 2020-01-08

## 2020-01-08 PROBLEM — E88.09 HYPOALBUMINEMIA: Status: ACTIVE | Noted: 2020-01-08

## 2020-01-08 LAB
ABSOLUTE EOS #: 0.43 K/UL (ref 0–0.44)
ABSOLUTE IMMATURE GRANULOCYTE: 0.04 K/UL (ref 0–0.3)
ABSOLUTE LYMPH #: 1.68 K/UL (ref 1.1–3.7)
ABSOLUTE MONO #: 0.61 K/UL (ref 0.1–1.2)
ALBUMIN SERPL-MCNC: 1.3 G/DL (ref 3.5–5.2)
ANION GAP SERPL CALCULATED.3IONS-SCNC: 9 MMOL/L (ref 9–17)
BASOPHILS # BLD: 0 % (ref 0–2)
BASOPHILS ABSOLUTE: 0.04 K/UL (ref 0–0.2)
BUN BLDV-MCNC: 22 MG/DL (ref 6–20)
BUN/CREAT BLD: ABNORMAL (ref 9–20)
C-REACTIVE PROTEIN: 251.2 MG/L (ref 0–5)
CALCIUM SERPL-MCNC: 7.9 MG/DL (ref 8.6–10.4)
CHLORIDE BLD-SCNC: 102 MMOL/L (ref 98–107)
CO2: 23 MMOL/L (ref 20–31)
CREAT SERPL-MCNC: 1.36 MG/DL (ref 0.5–0.9)
DIFFERENTIAL TYPE: ABNORMAL
EOSINOPHILS RELATIVE PERCENT: 5 % (ref 1–4)
GFR AFRICAN AMERICAN: 51 ML/MIN
GFR NON-AFRICAN AMERICAN: 42 ML/MIN
GFR SERPL CREATININE-BSD FRML MDRD: ABNORMAL ML/MIN/{1.73_M2}
GFR SERPL CREATININE-BSD FRML MDRD: ABNORMAL ML/MIN/{1.73_M2}
GLUCOSE BLD-MCNC: 165 MG/DL (ref 65–105)
GLUCOSE BLD-MCNC: 188 MG/DL (ref 65–105)
GLUCOSE BLD-MCNC: 188 MG/DL (ref 65–105)
GLUCOSE BLD-MCNC: 189 MG/DL (ref 65–105)
GLUCOSE BLD-MCNC: 231 MG/DL (ref 70–99)
HCT VFR BLD CALC: 29.9 % (ref 36.3–47.1)
HEMOGLOBIN: 9.4 G/DL (ref 11.9–15.1)
IMMATURE GRANULOCYTES: 0 %
LYMPHOCYTES # BLD: 19 % (ref 24–43)
MCH RBC QN AUTO: 30 PG (ref 25.2–33.5)
MCHC RBC AUTO-ENTMCNC: 31.4 G/DL (ref 28.4–34.8)
MCV RBC AUTO: 95.5 FL (ref 82.6–102.9)
MONOCYTES # BLD: 7 % (ref 3–12)
NRBC AUTOMATED: 0 PER 100 WBC
PDW BLD-RTO: 13.6 % (ref 11.8–14.4)
PHOSPHORUS: 3.7 MG/DL (ref 2.6–4.5)
PLATELET # BLD: 364 K/UL (ref 138–453)
PLATELET ESTIMATE: ABNORMAL
PMV BLD AUTO: 11.1 FL (ref 8.1–13.5)
POTASSIUM SERPL-SCNC: 3.8 MMOL/L (ref 3.7–5.3)
RBC # BLD: 3.13 M/UL (ref 3.95–5.11)
RBC # BLD: ABNORMAL 10*6/UL
SEG NEUTROPHILS: 69 % (ref 36–65)
SEGMENTED NEUTROPHILS ABSOLUTE COUNT: 6.11 K/UL (ref 1.5–8.1)
SODIUM BLD-SCNC: 134 MMOL/L (ref 135–144)
SURGICAL PATHOLOGY REPORT: NORMAL
WBC # BLD: 8.9 K/UL (ref 3.5–11.3)
WBC # BLD: ABNORMAL 10*3/UL

## 2020-01-08 PROCEDURE — 6360000002 HC RX W HCPCS: Performed by: STUDENT IN AN ORGANIZED HEALTH CARE EDUCATION/TRAINING PROGRAM

## 2020-01-08 PROCEDURE — 2580000003 HC RX 258: Performed by: STUDENT IN AN ORGANIZED HEALTH CARE EDUCATION/TRAINING PROGRAM

## 2020-01-08 PROCEDURE — 6370000000 HC RX 637 (ALT 250 FOR IP): Performed by: STUDENT IN AN ORGANIZED HEALTH CARE EDUCATION/TRAINING PROGRAM

## 2020-01-08 PROCEDURE — 6370000000 HC RX 637 (ALT 250 FOR IP): Performed by: INTERNAL MEDICINE

## 2020-01-08 PROCEDURE — 2580000003 HC RX 258: Performed by: NURSE PRACTITIONER

## 2020-01-08 PROCEDURE — 99232 SBSQ HOSP IP/OBS MODERATE 35: CPT | Performed by: INTERNAL MEDICINE

## 2020-01-08 PROCEDURE — 36415 COLL VENOUS BLD VENIPUNCTURE: CPT

## 2020-01-08 PROCEDURE — 86140 C-REACTIVE PROTEIN: CPT

## 2020-01-08 PROCEDURE — 82947 ASSAY GLUCOSE BLOOD QUANT: CPT

## 2020-01-08 PROCEDURE — 80069 RENAL FUNCTION PANEL: CPT

## 2020-01-08 PROCEDURE — 1200000000 HC SEMI PRIVATE

## 2020-01-08 PROCEDURE — 85025 COMPLETE CBC W/AUTO DIFF WBC: CPT

## 2020-01-08 PROCEDURE — 6360000002 HC RX W HCPCS: Performed by: NURSE PRACTITIONER

## 2020-01-08 RX ORDER — INSULIN GLARGINE 100 [IU]/ML
20 INJECTION, SOLUTION SUBCUTANEOUS NIGHTLY
Status: DISCONTINUED | OUTPATIENT
Start: 2020-01-08 | End: 2020-01-10

## 2020-01-08 RX ADMIN — MORPHINE SULFATE 2 MG: 2 INJECTION, SOLUTION INTRAMUSCULAR; INTRAVENOUS at 23:04

## 2020-01-08 RX ADMIN — CEFTRIAXONE SODIUM 2 G: 2 INJECTION, POWDER, FOR SOLUTION INTRAMUSCULAR; INTRAVENOUS at 14:22

## 2020-01-08 RX ADMIN — HYDROCODONE BITARTRATE AND ACETAMINOPHEN 2 TABLET: 5; 325 TABLET ORAL at 05:22

## 2020-01-08 RX ADMIN — HYDROCODONE BITARTRATE AND ACETAMINOPHEN 2 TABLET: 5; 325 TABLET ORAL at 08:36

## 2020-01-08 RX ADMIN — MORPHINE SULFATE 2 MG: 2 INJECTION, SOLUTION INTRAMUSCULAR; INTRAVENOUS at 07:06

## 2020-01-08 RX ADMIN — HYDROCODONE BITARTRATE AND ACETAMINOPHEN 2 TABLET: 5; 325 TABLET ORAL at 16:48

## 2020-01-08 RX ADMIN — ANTI-FUNGAL POWDER MICONAZOLE NITRATE TALC FREE: 1.42 POWDER TOPICAL at 08:27

## 2020-01-08 RX ADMIN — CEFTAROLINE FOSAMIL 600 MG: 600 POWDER, FOR SOLUTION INTRAVENOUS at 05:32

## 2020-01-08 RX ADMIN — HYDROCODONE BITARTRATE AND ACETAMINOPHEN 2 TABLET: 5; 325 TABLET ORAL at 21:39

## 2020-01-08 RX ADMIN — CARVEDILOL 12.5 MG: 12.5 TABLET, FILM COATED ORAL at 08:27

## 2020-01-08 RX ADMIN — ONDANSETRON 4 MG: 2 INJECTION INTRAMUSCULAR; INTRAVENOUS at 12:28

## 2020-01-08 RX ADMIN — CARVEDILOL 12.5 MG: 12.5 TABLET, FILM COATED ORAL at 16:49

## 2020-01-08 RX ADMIN — APIXABAN 2.5 MG: 2.5 TABLET, FILM COATED ORAL at 08:27

## 2020-01-08 RX ADMIN — INSULIN GLARGINE 20 UNITS: 100 INJECTION, SOLUTION SUBCUTANEOUS at 21:39

## 2020-01-08 RX ADMIN — MORPHINE SULFATE 2 MG: 2 INJECTION, SOLUTION INTRAMUSCULAR; INTRAVENOUS at 18:31

## 2020-01-08 RX ADMIN — INSULIN LISPRO 3 UNITS: 100 INJECTION, SOLUTION INTRAVENOUS; SUBCUTANEOUS at 08:28

## 2020-01-08 RX ADMIN — ASPIRIN 81 MG 81 MG: 81 TABLET ORAL at 08:27

## 2020-01-08 RX ADMIN — ONDANSETRON 4 MG: 2 INJECTION INTRAMUSCULAR; INTRAVENOUS at 07:06

## 2020-01-08 RX ADMIN — HYDROCODONE BITARTRATE AND ACETAMINOPHEN 2 TABLET: 5; 325 TABLET ORAL at 12:05

## 2020-01-08 RX ADMIN — LEVOTHYROXINE SODIUM 50 MCG: 100 TABLET ORAL at 08:27

## 2020-01-08 RX ADMIN — ANTI-FUNGAL POWDER MICONAZOLE NITRATE TALC FREE: 1.42 POWDER TOPICAL at 21:40

## 2020-01-08 RX ADMIN — INSULIN LISPRO 3 UNITS: 100 INJECTION, SOLUTION INTRAVENOUS; SUBCUTANEOUS at 13:05

## 2020-01-08 RX ADMIN — INSULIN LISPRO 6 UNITS: 100 INJECTION, SOLUTION INTRAVENOUS; SUBCUTANEOUS at 18:26

## 2020-01-08 RX ADMIN — SODIUM CHLORIDE, PRESERVATIVE FREE 10 ML: 5 INJECTION INTRAVENOUS at 08:28

## 2020-01-08 RX ADMIN — SIMVASTATIN 20 MG: 20 TABLET, FILM COATED ORAL at 21:39

## 2020-01-08 RX ADMIN — ONDANSETRON 4 MG: 2 INJECTION INTRAMUSCULAR; INTRAVENOUS at 18:31

## 2020-01-08 RX ADMIN — APIXABAN 2.5 MG: 2.5 TABLET, FILM COATED ORAL at 21:39

## 2020-01-08 RX ADMIN — INSULIN LISPRO 2 UNITS: 100 INJECTION, SOLUTION INTRAVENOUS; SUBCUTANEOUS at 21:39

## 2020-01-08 ASSESSMENT — PAIN SCALES - GENERAL
PAINLEVEL_OUTOF10: 9
PAINLEVEL_OUTOF10: 9
PAINLEVEL_OUTOF10: 8
PAINLEVEL_OUTOF10: 9
PAINLEVEL_OUTOF10: 7
PAINLEVEL_OUTOF10: 8
PAINLEVEL_OUTOF10: 7
PAINLEVEL_OUTOF10: 7

## 2020-01-08 NOTE — PROGRESS NOTES
sweats  Respiratory:  negative for cough, dyspnea on exertion, hemoptysis, shortness of breath, wheezing  Cardiovascular:  negative for chest pain, chest pressure/discomfort, lower extremity edema, palpitations  Gastrointestinal:  negative for abdominal pain, constipation, diarrhea, nausea, vomiting  Extremities: reports right foot pain  Neurological:  negative for dizziness, headache    Medications: Allergies:  No Known Allergies    Current Meds:   Scheduled Meds:    cefTRIAXone (ROCEPHIN) IV  2 g Intravenous Q24H    miconazole   Topical BID    sodium chloride flush  10 mL Intravenous BID    carvedilol  12.5 mg Oral BID WC    insulin glargine  40 Units Subcutaneous Nightly    aspirin  81 mg Oral Daily    levothyroxine  50 mcg Oral Daily    simvastatin  20 mg Oral Nightly    apixaban  2.5 mg Oral BID    insulin lispro  0-18 Units Subcutaneous TID WC    insulin lispro  0-9 Units Subcutaneous Nightly     Continuous Infusions:    sodium chloride 75 mL/hr at 01/07/20 1143    dextrose       PRN Meds: tiZANidine, morphine, HYDROcodone 5 mg - acetaminophen, diphenhydrAMINE, sodium chloride flush, sodium chloride flush, potassium chloride **OR** potassium alternative oral replacement **OR** potassium chloride, magnesium sulfate, magnesium hydroxide, nicotine, ondansetron, acetaminophen, glucose, dextrose, glucagon (rDNA), dextrose    Data:     Past Medical History:   has a past medical history of Hyperlipidemia, Hypertension, Hypothyroidism, Type II or unspecified type diabetes mellitus without mention of complication, not stated as uncontrolled, and Urinary incontinence. Social History:   reports that she has quit smoking. She smoked 0.10 packs per day. She does not have any smokeless tobacco history on file. She reports that she does not drink alcohol or use drugs.      Family History:   Family History   Problem Relation Age of Onset    Stroke Father     Heart Disease Other        Vitals:  /72 1/8/2020 Yes    Hypothyroidism 1/8/2020 Yes    CAROLYN (acute kidney injury) ([facility] [address] 1/8/2020 Yes    Cellulitis of foot 1/8/2020 Yes    Septic arthritis of right foot ([facility] [address] 1/8/2020 Yes          Plan:        1. Appreciate podiatry and infectious disease recommendations  2. S/p I&D on 1/5; amputation on 1/6  3. Continue Cefatrolene 600 q 12 h and diflucan 200 q d   4. Monitor renal function - nephrology has signed off; patient with a significant amount of proteinuria and a very low albumin. This is likely secondary to diabetic nephropathy. She will need to make sure she follows up with a nephrologist in the outpatient setting. Renal function is stable. She will also need to remain on Eliquis, as she is in a hypercoaguable state with her nephrotic range proteinuria. 5. Continue to monitor fluid status in the setting in chronic diastolic heart failure - appears compensated at this moment in time. 6. Hold parameters placed on coreg; hold lisinopril tonight  7. ISS and Lantus - giving half dose of Lantus (20 units) tonight  8. Eliquis for DVT ppx (hx of DVT) - hold after dose tonight  9.  Discharge planning - patient hoping to go home with home health care    Angelique Mckeon DO  1/8/2020  4:10 PM

## 2020-01-08 NOTE — PROGRESS NOTES
Progress Note  Podiatric Medicine and Surgery     Subjective     CC: Right foot pain, diabetic wound to right foot    POD #2 incision and drainage, right hallux amputation (DOS 1/6/20)  Patient seen and evaluated bedside  Patient denies nausea, vomiting, fever, chills    HPI :  Savage Pereyra is a 38 y.o. female with a past medical history of DM2 with neuropathy was seen at [facility] for right foot pain and wound. Pt states that about 1 week ago her right great toe became very painful and this morning she noted worsening pain and new redness of her right great toe. Pt denies any trauma or stepping on anything sharp. She notes her redness progressively has moved up towards her ankle. Patient is originally from [place] and has moved here, she currently does not have her insulin. Pt is also complaining of calf pain. She has a history of a DVT and PE. Pt denies any N/V/F/C/CP/SOB.     PCP is KAYLEE Cutler - CNP    ROS: Denies N/V/F/C/SOB/CP. Otherwise negative except at stated in the HPI.      Medications:  Scheduled Meds:   ceftaroline fosamil (TEFLARO) IVPB  600 mg Intravenous Q12H    miconazole   Topical BID    sodium chloride flush  10 mL Intravenous BID    carvedilol  12.5 mg Oral BID WC    insulin glargine  40 Units Subcutaneous Nightly    aspirin  81 mg Oral Daily    levothyroxine  50 mcg Oral Daily    simvastatin  20 mg Oral Nightly    apixaban  2.5 mg Oral BID    insulin lispro  0-18 Units Subcutaneous TID WC    insulin lispro  0-9 Units Subcutaneous Nightly       Continuous Infusions:   sodium chloride 75 mL/hr at 01/07/20 1143    dextrose         PRN Meds:tiZANidine, morphine, HYDROcodone 5 mg - acetaminophen, diphenhydrAMINE, sodium chloride flush, sodium chloride flush, potassium chloride **OR** potassium alternative oral replacement **OR** potassium chloride, magnesium sulfate, magnesium hydroxide, nicotine, ondansetron, acetaminophen, glucose, dextrose, glucagon (rDNA), dextrose    Objective     Vitals:  Patient Vitals for the past 8 hrs:   BP Temp Temp src Pulse Resp   20 0843 134/72 98.2 °F (36.8 °C) Oral 68 18     Average, Min, and Max for last 24 hours Vitals:  TEMPERATURE:  Temp  Av.2 °F (36.8 °C)  Min: 98.2 °F (36.8 °C)  Max: 98.2 °F (36.8 °C)    RESPIRATIONS RANGE: Resp  Av  Min: 18  Max: 18    PULSE RANGE: Pulse  Av.7  Min: 65  Max: 76    BLOOD PRESSURE RANGE:  Systolic (01ALE), JDS:364 , Min:129 , VTN:561   ; Diastolic (94ATX), TDF:51, Min:72, Max:74      PULSE OXIMETRY RANGE: No data recorded    I/O last 3 completed shifts: In: 0399 [P.O.:600; I.V.:542]  Out: 900 [Urine:900]    CBC:  Recent Labs     20  03520  05020  0433   WBC 11.5* 10.0 8.9   HGB 9.5* 9.1* 9.4*   HCT 31.2* 29.3* 29.9*    310 364   .5* 331.7* 251.2*        BMP:  Recent Labs     20  05020  0433   NA  --  133* 134*   K  --  3.3* 3.8   CL  --  99 102   CO2  --  21 23   BUN 22* 23* 22*   CREATININE 1.41* 1.31* 1.36*   GLUCOSE  --  260* 231*   CALCIUM  --  7.9* 7.9*        Coags:  No results for input(s): APTT, PROT, INR in the last 72 hours. Lab Results   Component Value Date    SEDRATE >130 (H) 2020     Recent Labs     20  05020  0433   .5* 331.7* 251.2*       Lower Extremity Physical Exam:    Vascular: DP and PT pulses are palpable. CFT <3 seconds to all digits. Hair growth is absent to the level of the digits. +1 pitting edema to the right foot     Neuro: Saph/sural/SP/DP/plantar sensation diminished to light touch.     Musculoskeletal: Muscle strength is 5/5 to all lower extremity muscle groups on Left, deferred on the right. Gross deformity is absent. Painful ROM of right ankle     Dermatologic:  Open surgical wound noted to dorsal right forefoot, status post hallux disarticulation. Wound measures approximately 7 x 5 x 0.4 cm.   There is exposed first metatarsal head bone and the base of the wound. Wound base is noted to be primarily granular. There is no tunneling, tracking did. There is no purulence expressed at this time. There is mild sanguinous drainage noted at this time. Imaging:   US RENAL COMPLETE   Final Result   Unremarkable ultrasound of the kidneys and urinary bladder. VL LOWER EXTREMITY ARTERIAL SEGMENTAL PRESSURES W PPG BILATERAL   Final Result      XR FOOT RIGHT (MIN 3 VIEWS)   Final Result   1. Soft tissue swelling and gas in the great toe, extending to the level of   the 1st metatarsal head, concerning for gas gangrene. 2.  No radiographic evidence of osteomyelitis. MRI FOOT RIGHT W WO CONTRAST   Final Result   1. No evidence of osteomyelitis in the right forefoot. 2. Focal confluent soft tissue edema at the plantar aspect of the proximal   great toe, possibly focal cellulitis or phlegmon. No definite organized   fluid collection to indicate abscess at this time. This area measures   approximately 1.9 x 0.8 x 2.1 cm.   3. A couple low signal foci are seen within the plantar soft tissues of the   proximal great toe, favored to be vascular structures but possibility of soft   tissue gas is not entirely excluded. 4. Small joint effusion at the 1st MTP joint. MRI FOOT RIGHT W WO CONTRAST   Final Result   1. No osteomyelitis or other acute osseous abnormality of the hindfoot. 2. Circumferential subcutaneous edema about the ankle and along the dorsum of   the midfoot compatible with bland edema versus cellulitis. No deep soft   tissue ulceration or drainable fluid collection. VL DUP LOWER EXTREMITY VENOUS RIGHT   Final Result      XR FOOT RIGHT (MIN 3 VIEWS)   Final Result   Soft tissue edema surrounding the 1st digit without evidence for radiopaque   foreign body or subcutaneous air. Cultures:   Wound culture, right hallux 12/31/2019: Group B Strep, Candida Albicans, S.  Aureus    Assessment Arnie Rhodes is a 38 y.o. female with   1. S/p, right hallux amputation (DOS 1/6/20)  2. S/p, incision and drainage, right foot (DOS)  3. Diabetic wound to the level of subcutaneous tissue, right hallux  4. Cellulitis, right foot  5. DM2 with neuropathy  6. History of DVT  7. Chronic CHF    Principal Problem:    Cellulitis of right foot  Active Problems:    Hypertension    Morbid obesity due to excess calories ([facility])    Essential hypertension    Hypothyroidism    Diabetic polyneuropathy ([facility])    Type 2 diabetes mellitus with neurologic complication, with long-term current use of insulin ([facility])    Chronic diastolic congestive heart failure ([facility])    CAROLYN (acute kidney injury) ([facility] [address]    Cellulitis of foot    Type 2 diabetes mellitus with right diabetic foot infection ([facility] [address]    Septic arthritis of right foot ([facility])  Resolved Problems:    Foot infection    Hyperglycemia    Non compliance w medication regimen    H/O deep venous thrombosis       Plan     · Patient examined and evaluated at bedside   · Treatment options discussed in detail with the patient  · Medicine team-medical management  · ID-ceftaroline, Diflucan  · Dressing changed to right foot: Saline WTD  · Plan for TMA versus debridement with wound VAC application for this Friday at 230pm.  · NWB to Right lower extremity in surgical shoe. · Discussed with Dr. Duong Bravo, MARIA GUADALUPEM   Podiatric Medicine & Surgery   1/8/2020 at 11:18 AM       I performed a history and physical examination of the patient and discussed management with the resident. I reviewed the residents note and agree with the documented findings and plan of care. Any areas of disagreement are noted on the chart. I was personally present for the key portions of any procedures. I have documented in the chart those procedures where I was not present during the key portions. I have reviewed the Podiatry Resident progress note.  I agree with the chief complaint, past medical history, past surgical history, allergies, medications, social and family history as documented unless otherwise noted below. Documentation of the HPI, Physical Exam and Medical Decision Making performed by medical students or scribes is based on my personal performance of the HPI, PE and MDM. I have personally evaluated this patient and have completed at least one if not all key elements of the E/M (history, physical exam, and MDM). Additional findings are as noted.      Odessa Morrissey DPM on 1/8/2020 at 60:32 PM  Board Certified, American Board of Podiatric Surgery  Fellow, [employer] [facility] and [facility]

## 2020-01-08 NOTE — PROGRESS NOTES
ED.    In the ED she was found to have abscess at the base of the Rt great toe with erythema and fluctuance noted. The wound did not penetrate to the bone, no induration or crepitus noted. , .5    Pt was admitted, started on Zosyn and Vancomycin, and seen by podiatry, and I&D was performed with serosanguinous drainage noted. MRI's of the foot showed:  No osteomyelitis or other acute osseous abnormality of the hindfoot. 2. Circumferential subcutaneous edema about the ankle and along the dorsum of   the midfoot compatible with bland edema versus cellulitis.  No deep soft   tissue ulceration or drainable fluid collection. 1. No evidence of osteomyelitis in the right forefoot. 2. Focal confluent soft tissue edema at the plantar aspect of the proximal   great toe, possibly focal cellulitis or phlegmon.  No definite organized   fluid collection to indicate abscess at this time.  This area measures   approximately 1.9 x 0.8 x 2.1 cm.   3. A couple low signal foci are seen within the plantar soft tissues of the   proximal great toe, favored to be vascular structures but possibility of soft   tissue gas is not entirely excluded. 4. Small joint effusion at the 1st MTP joint. Wound cultures are showing Grp B strep and Candida. 1-6 pt to OR for Rt hallux amputation and I&D    1-8 Wound is clean, plan for TMA on 1-10-20  Staph isolate from wound is methicillin sensitive. Stop Ceftaroline and resume Ceftriaxone - stop date remains 1-15-20    CURRENT EXAMINATION: 1/8/2020    Afebrile  VS stable    The patient seen and evaluated at bedside. Patient developed drainage of pus from foot on 1-6-20   Rt Foot X ray now shows gas formation which was not present when MRI was done on 1-4-20. She was taken to the OR on 1-6 for Rt Hallux amputation and I&D  Plan for Transmetatarsal amputation on 1-10 per Pt and Podiatry. Staph isolate from wound is methicillin sensitive.  Stop Ceftaroline and resume aspirin  81 mg Oral Daily    levothyroxine  50 mcg Oral Daily    simvastatin  20 mg Oral Nightly    apixaban  2.5 mg Oral BID    insulin lispro  0-18 Units Subcutaneous TID WC    insulin lispro  0-9 Units Subcutaneous Nightly       Social History:     Social History     Socioeconomic History    Marital status:      Spouse name: Not on file    Number of children: Not on file    Years of education: Not on file    Highest education level: Not on file   Occupational History    Not on file   Social Needs    Financial resource strain: Not on file    Food insecurity:     Worry: Not on file     Inability: Not on file    Transportation needs:     Medical: Not on file     Non-medical: Not on file   Tobacco Use    Smoking status: Former Smoker     Packs/day: 0.10   Substance and Sexual Activity    Alcohol use: No    Drug use: No    Sexual activity: Not on file   Lifestyle    Physical activity:     Days per week: Not on file     Minutes per session: Not on file    Stress: Not on file   Relationships    Social connections:     Talks on phone: Not on file     Gets together: Not on file     Attends Hindu service: Not on file     Active member of club or organization: Not on file     Attends meetings of clubs or organizations: Not on file     Relationship status: Not on file    Intimate partner violence:     Fear of current or ex partner: Not on file     Emotionally abused: Not on file     Physically abused: Not on file     Forced sexual activity: Not on file   Other Topics Concern    Not on file   Social History Narrative    Not on file       Family History:     Family History   Problem Relation Age of Onset    Stroke Father     Heart Disease Other         Allergies:   Patient has no known allergies. Review of Systems:   Constitutional: No fevers or chills. No systemic complaints. Feeling better today  Head: No headaches  Eyes: No double vision or blurry vision.  No conjunctival inflammation. ENT: No sore throat or runny nose. . No hearing loss, tinnitus or vertigo. Cardiovascular: No chest pain or palpitations. No shortness of breath. No TORRES  Lung: No shortness of breath or cough. No sputum production  Abdomen: Mild nausea, no vomiting, diarrhea, or abdominal pain. Rahel Pih No cramps. Genitourinary: No increased urinary frequency, or dysuria. No hematuria. No suprapubic or CVA pain  Musculoskeletal: No muscle aches or pains. No joint effusions, swelling or deformities. Pain controlled  Hematologic: No bleeding or bruising. Neurologic: No headache, weakness, numbness, or tingling. Integument: No rash, no ulcers. Psychiatric: No depression. Endocrine: No polyuria, no polydipsia, no polyphagia. Physical Examination :     Patient Vitals for the past 8 hrs:   BP Temp Temp src Pulse Resp   01/08/20 0843 134/72 98.2 °F (36.8 °C) Oral 68 18     General Appearance: Awake, alert, and in no apparent distress  Head:  Normocephalic, no trauma  Eyes: Pupils equal, round, reactive to light and accommodation; extraocular movements intact; sclera anicteric; conjunctivae pink. No embolic phenomena. ENT: Oropharynx clear, without erythema, exudate, or thrush. No tenderness of sinuses. Mouth/throat: mucosa pink and moist. No lesions. Dentition in good repair. Neck:Supple, without lymphadenopathy. Thyroid normal, No bruits. Pulmonary/Chest: Clear to auscultation, without wheezes, rales, or rhonchi. No dullness to percussion. Cardiovascular: Regular rate and rhythm without murmurs, rubs, or gallops. Distant heart tones  Abdomen: Soft, non tender. Bowel sounds normal. No organomegaly  All four Extremities: No cyanosis, clubbing, edema, or effusions. Neurologic: No gross sensory or motor deficits. Skin: Warm and dry with good turgor. With signs of peripheral arterial or venous insufficiency.  Rt foot dressing clean and dry    Medical Decision Making -Laboratory:   I have independently reviewed/ordered the performed without and   with the administration of intravenous contrast.       COMPARISON:   12/31/2019 hindfoot MRI       HISTORY:   ORDERING SYSTEM PROVIDED HISTORY: Wound   TECHNOLOGIST PROVIDED HISTORY:   Wound   What is the area of interest?->Forefoot   Is the patient pregnant?->No       Wound at the plantar aspect of the great toe.       FINDINGS:   There is mild subcutaneous edema in the dorsal and plantar forefoot.  Focal   confluent soft tissue edema measuring approximately 1.9 x 0.8 x 2.1 cm is   seen in the soft tissues at the plantar aspect of the proximal great toe,   superficial to the flexor tendon, adjacent to the proximal phalanx.  There is   appears to be intervening fat on the T1 weighted sequences.  No definite   organized fluid collection identified in the soft tissues.  No abnormal   enhancement.  There are a couple low signal foci within the plantar soft   tissues of the great toe, somewhat linear appearance, suggesting a vascular   structure, but possibility of soft tissue gas is not excluded. [place] is a   small effusion at the 1st MTP joint.  No marrow signal abnormality.       There is a small amount of fluid in the 1st and 3rd intermetatarsal bursa. No abnormal intermetatarsal enhancing mass.       There is mild increased signal within the intrinsic muscles of the foot. There is mild scattered fatty atrophy of the intrinsic muscles.       The visualized portions of the flexor and extensor tendons are intact.           Impression   1. No evidence of osteomyelitis in the right forefoot.    2. Focal confluent soft tissue edema at the plantar aspect of the proximal   great toe, possibly focal cellulitis or phlegmon.  No definite organized   fluid collection to indicate abscess at this time.  This area measures   approximately 1.9 x 0.8 x 2.1 cm.   3. A couple low signal foci are seen within the plantar soft tissues of the   proximal great toe, favored to be vascular structures but possibility of soft   tissue gas is not entirely excluded. 4. Small joint effusion at the 1st MTP joint. Medical Decision Zscxhj-Ebjhqisf-Tdesu:   1/7/2020  5:41 PM - Sammy, Mhpn Incoming Lab Results From [facility]     Specimen Information: Foot        Component Collected Lab   Specimen Description 12/31/2019 12:21  [address]   . FOOT RIGHT    Special Requests 12/31/2019 12:21  [address]   NOT REPORTED    Direct Exam 12/31/2019 12:21  [address]   NO NEUTROPHILS SEEN    Direct Exam Abnormal  12/31/2019 12:21 PM [address] [place]    Direct Exam Abnormal  12/31/2019 12:21 PM [id number]    Culture Abnormal  12/31/2019 12:21 PM [address] MODERATE GROWTH    Culture Abnormal  12/31/2019 12:21 PM [address]    Culture Abnormal  12/31/2019 12:21 PM [place] This isolate is methicillin susceptible. Culture 12/31/2019 12:21  [address]    Culture Abnormal  12/31/2019 12:21  [address]   This is a mixed culture of organisms, which may represent colonization or contamination.  Notify the laboratory within 7 days for further workup.  Susceptibilities have not been performed.    Culture Abnormal  12/31/2019 12:21  [address] (BACTEROIDES BIVIUS) BIVIA LIGHT GROWTH BETA LACTAMASE POSITIVE          Medical Decision Making-Other:     Note:  · Labs, medications, radiologic studies were reviewed with personal review of films  · Moderate Large amounts of data were reviewed  · Discussed with nursing Staff, Discharge planner  · Infection Control and Prevention measures reviewed  · All prior entries were

## 2020-01-08 NOTE — PROGRESS NOTES
Updated plan: OR tomorrow at 130pm for right foot TMA.  NPO at midnight, Hold [facility] tomorrow morning

## 2020-01-09 ENCOUNTER — ANESTHESIA (OUTPATIENT)
Dept: OPERATING ROOM | Age: 45
DRG: 305 | End: 2020-01-09
Payer: COMMERCIAL

## 2020-01-09 ENCOUNTER — ANESTHESIA EVENT (OUTPATIENT)
Dept: OPERATING ROOM | Age: 45
DRG: 305 | End: 2020-01-09
Payer: COMMERCIAL

## 2020-01-09 VITALS — TEMPERATURE: 96.7 F | DIASTOLIC BLOOD PRESSURE: 72 MMHG | OXYGEN SATURATION: 100 % | SYSTOLIC BLOOD PRESSURE: 117 MMHG

## 2020-01-09 LAB
ABSOLUTE EOS #: 0.37 K/UL (ref 0–0.44)
ABSOLUTE IMMATURE GRANULOCYTE: 0.05 K/UL (ref 0–0.3)
ABSOLUTE LYMPH #: 1.91 K/UL (ref 1.1–3.7)
ABSOLUTE MONO #: 0.53 K/UL (ref 0.1–1.2)
ALBUMIN SERPL-MCNC: 1.6 G/DL (ref 3.5–5.2)
ANCA MYELOPEROXIDASE: 17 AU/ML
ANCA PROTEINASE 3: 14 AU/ML
ANION GAP SERPL CALCULATED.3IONS-SCNC: 10 MMOL/L (ref 9–17)
BASOPHILS # BLD: 0 % (ref 0–2)
BASOPHILS ABSOLUTE: <0.03 K/UL (ref 0–0.2)
BUN BLDV-MCNC: 17 MG/DL (ref 6–20)
BUN/CREAT BLD: ABNORMAL (ref 9–20)
C-REACTIVE PROTEIN: 142.6 MG/L (ref 0–5)
CALCIUM SERPL-MCNC: 8.2 MG/DL (ref 8.6–10.4)
CHLORIDE BLD-SCNC: 107 MMOL/L (ref 98–107)
CO2: 25 MMOL/L (ref 20–31)
CREAT SERPL-MCNC: 1.31 MG/DL (ref 0.5–0.9)
DIFFERENTIAL TYPE: ABNORMAL
EOSINOPHILS RELATIVE PERCENT: 5 % (ref 1–4)
GFR AFRICAN AMERICAN: 53 ML/MIN
GFR NON-AFRICAN AMERICAN: 44 ML/MIN
GFR SERPL CREATININE-BSD FRML MDRD: ABNORMAL ML/MIN/{1.73_M2}
GFR SERPL CREATININE-BSD FRML MDRD: ABNORMAL ML/MIN/{1.73_M2}
GLUCOSE BLD-MCNC: 104 MG/DL (ref 65–105)
GLUCOSE BLD-MCNC: 105 MG/DL (ref 65–105)
GLUCOSE BLD-MCNC: 105 MG/DL (ref 65–105)
GLUCOSE BLD-MCNC: 126 MG/DL (ref 70–99)
GLUCOSE BLD-MCNC: 201 MG/DL (ref 65–105)
GLUCOSE BLD-MCNC: 97 MG/DL (ref 65–105)
HCT VFR BLD CALC: 30.2 % (ref 36.3–47.1)
HEMOGLOBIN: 9.4 G/DL (ref 11.9–15.1)
IMMATURE GRANULOCYTES: 1 %
LYMPHOCYTES # BLD: 24 % (ref 24–43)
MCH RBC QN AUTO: 29.6 PG (ref 25.2–33.5)
MCHC RBC AUTO-ENTMCNC: 31.1 G/DL (ref 28.4–34.8)
MCV RBC AUTO: 95 FL (ref 82.6–102.9)
MONOCYTES # BLD: 7 % (ref 3–12)
NRBC AUTOMATED: 0 PER 100 WBC
PDW BLD-RTO: 13.5 % (ref 11.8–14.4)
PHOSPHORUS: 3.9 MG/DL (ref 2.6–4.5)
PLATELET # BLD: 374 K/UL (ref 138–453)
PLATELET ESTIMATE: ABNORMAL
PMV BLD AUTO: 10.6 FL (ref 8.1–13.5)
POTASSIUM SERPL-SCNC: 3.7 MMOL/L (ref 3.7–5.3)
RBC # BLD: 3.18 M/UL (ref 3.95–5.11)
RBC # BLD: ABNORMAL 10*6/UL
SEG NEUTROPHILS: 63 % (ref 36–65)
SEGMENTED NEUTROPHILS ABSOLUTE COUNT: 5.23 K/UL (ref 1.5–8.1)
SODIUM BLD-SCNC: 142 MMOL/L (ref 135–144)
WBC # BLD: 8.1 K/UL (ref 3.5–11.3)
WBC # BLD: ABNORMAL 10*3/UL

## 2020-01-09 PROCEDURE — 6360000002 HC RX W HCPCS: Performed by: STUDENT IN AN ORGANIZED HEALTH CARE EDUCATION/TRAINING PROGRAM

## 2020-01-09 PROCEDURE — 2580000003 HC RX 258: Performed by: PODIATRIST

## 2020-01-09 PROCEDURE — 6370000000 HC RX 637 (ALT 250 FOR IP): Performed by: STUDENT IN AN ORGANIZED HEALTH CARE EDUCATION/TRAINING PROGRAM

## 2020-01-09 PROCEDURE — 7100000000 HC PACU RECOVERY - FIRST 15 MIN: Performed by: PODIATRIST

## 2020-01-09 PROCEDURE — 88307 TISSUE EXAM BY PATHOLOGIST: CPT

## 2020-01-09 PROCEDURE — 99232 SBSQ HOSP IP/OBS MODERATE 35: CPT | Performed by: INTERNAL MEDICINE

## 2020-01-09 PROCEDURE — 0Y6M0ZB DETACHMENT AT RIGHT FOOT, PARTIAL 2ND RAY, OPEN APPROACH: ICD-10-PCS | Performed by: PODIATRIST

## 2020-01-09 PROCEDURE — 80069 RENAL FUNCTION PANEL: CPT

## 2020-01-09 PROCEDURE — 2580000003 HC RX 258: Performed by: STUDENT IN AN ORGANIZED HEALTH CARE EDUCATION/TRAINING PROGRAM

## 2020-01-09 PROCEDURE — 36415 COLL VENOUS BLD VENIPUNCTURE: CPT

## 2020-01-09 PROCEDURE — 2580000003 HC RX 258: Performed by: NURSE PRACTITIONER

## 2020-01-09 PROCEDURE — 3700000000 HC ANESTHESIA ATTENDED CARE: Performed by: PODIATRIST

## 2020-01-09 PROCEDURE — 0Y6M0ZD DETACHMENT AT RIGHT FOOT, PARTIAL 4TH RAY, OPEN APPROACH: ICD-10-PCS | Performed by: PODIATRIST

## 2020-01-09 PROCEDURE — 6370000000 HC RX 637 (ALT 250 FOR IP): Performed by: PODIATRIST

## 2020-01-09 PROCEDURE — 3600000004 HC SURGERY LEVEL 4 BASE: Performed by: PODIATRIST

## 2020-01-09 PROCEDURE — 7100000001 HC PACU RECOVERY - ADDTL 15 MIN: Performed by: PODIATRIST

## 2020-01-09 PROCEDURE — 2500000003 HC RX 250 WO HCPCS: Performed by: NURSE ANESTHETIST, CERTIFIED REGISTERED

## 2020-01-09 PROCEDURE — 2580000003 HC RX 258: Performed by: NURSE ANESTHETIST, CERTIFIED REGISTERED

## 2020-01-09 PROCEDURE — 6360000002 HC RX W HCPCS: Performed by: NURSE ANESTHETIST, CERTIFIED REGISTERED

## 2020-01-09 PROCEDURE — 88311 DECALCIFY TISSUE: CPT

## 2020-01-09 PROCEDURE — 2709999900 HC NON-CHARGEABLE SUPPLY: Performed by: PODIATRIST

## 2020-01-09 PROCEDURE — 0Y6M0Z9 DETACHMENT AT RIGHT FOOT, PARTIAL 1ST RAY, OPEN APPROACH: ICD-10-PCS | Performed by: PODIATRIST

## 2020-01-09 PROCEDURE — 86140 C-REACTIVE PROTEIN: CPT

## 2020-01-09 PROCEDURE — 0Y6M0ZC DETACHMENT AT RIGHT FOOT, PARTIAL 3RD RAY, OPEN APPROACH: ICD-10-PCS | Performed by: PODIATRIST

## 2020-01-09 PROCEDURE — 3600000014 HC SURGERY LEVEL 4 ADDTL 15MIN: Performed by: PODIATRIST

## 2020-01-09 PROCEDURE — 0L8N0ZZ DIVISION OF RIGHT LOWER LEG TENDON, OPEN APPROACH: ICD-10-PCS | Performed by: PODIATRIST

## 2020-01-09 PROCEDURE — 85025 COMPLETE CBC W/AUTO DIFF WBC: CPT

## 2020-01-09 PROCEDURE — 28805 AMPUTATION THRU METATARSAL: CPT | Performed by: PODIATRIST

## 2020-01-09 PROCEDURE — 2500000003 HC RX 250 WO HCPCS: Performed by: PODIATRIST

## 2020-01-09 PROCEDURE — 6360000002 HC RX W HCPCS: Performed by: NURSE PRACTITIONER

## 2020-01-09 PROCEDURE — 82947 ASSAY GLUCOSE BLOOD QUANT: CPT

## 2020-01-09 PROCEDURE — 6370000000 HC RX 637 (ALT 250 FOR IP): Performed by: NURSE ANESTHETIST, CERTIFIED REGISTERED

## 2020-01-09 PROCEDURE — 27606 INCISION OF ACHILLES TENDON: CPT | Performed by: PODIATRIST

## 2020-01-09 PROCEDURE — 1200000000 HC SEMI PRIVATE

## 2020-01-09 PROCEDURE — 3700000001 HC ADD 15 MINUTES (ANESTHESIA): Performed by: PODIATRIST

## 2020-01-09 PROCEDURE — 0Y6M0ZF DETACHMENT AT RIGHT FOOT, PARTIAL 5TH RAY, OPEN APPROACH: ICD-10-PCS | Performed by: PODIATRIST

## 2020-01-09 RX ORDER — DIPHENHYDRAMINE HYDROCHLORIDE 50 MG/ML
INJECTION INTRAMUSCULAR; INTRAVENOUS PRN
Status: DISCONTINUED | OUTPATIENT
Start: 2020-01-09 | End: 2020-01-09 | Stop reason: SDUPTHER

## 2020-01-09 RX ORDER — MAGNESIUM HYDROXIDE 1200 MG/15ML
LIQUID ORAL CONTINUOUS PRN
Status: COMPLETED | OUTPATIENT
Start: 2020-01-09 | End: 2020-01-09

## 2020-01-09 RX ORDER — FENTANYL CITRATE 50 UG/ML
INJECTION, SOLUTION INTRAMUSCULAR; INTRAVENOUS PRN
Status: DISCONTINUED | OUTPATIENT
Start: 2020-01-09 | End: 2020-01-09 | Stop reason: SDUPTHER

## 2020-01-09 RX ORDER — LIDOCAINE HYDROCHLORIDE 20 MG/ML
JELLY TOPICAL PRN
Status: DISCONTINUED | OUTPATIENT
Start: 2020-01-09 | End: 2020-01-09 | Stop reason: SDUPTHER

## 2020-01-09 RX ORDER — LIDOCAINE HYDROCHLORIDE 10 MG/ML
INJECTION, SOLUTION EPIDURAL; INFILTRATION; INTRACAUDAL; PERINEURAL PRN
Status: DISCONTINUED | OUTPATIENT
Start: 2020-01-09 | End: 2020-01-09 | Stop reason: SDUPTHER

## 2020-01-09 RX ORDER — PROPOFOL 10 MG/ML
INJECTION, EMULSION INTRAVENOUS PRN
Status: DISCONTINUED | OUTPATIENT
Start: 2020-01-09 | End: 2020-01-09 | Stop reason: SDUPTHER

## 2020-01-09 RX ORDER — ONDANSETRON 2 MG/ML
INJECTION INTRAMUSCULAR; INTRAVENOUS PRN
Status: DISCONTINUED | OUTPATIENT
Start: 2020-01-09 | End: 2020-01-09

## 2020-01-09 RX ORDER — PHENYLEPHRINE HYDROCHLORIDE 10 MG/ML
INJECTION INTRAVENOUS PRN
Status: DISCONTINUED | OUTPATIENT
Start: 2020-01-09 | End: 2020-01-09 | Stop reason: SDUPTHER

## 2020-01-09 RX ORDER — GLYCOPYRROLATE 1 MG/5 ML
SYRINGE (ML) INTRAVENOUS PRN
Status: DISCONTINUED | OUTPATIENT
Start: 2020-01-09 | End: 2020-01-09 | Stop reason: SDUPTHER

## 2020-01-09 RX ORDER — ONDANSETRON 2 MG/ML
INJECTION INTRAMUSCULAR; INTRAVENOUS PRN
Status: DISCONTINUED | OUTPATIENT
Start: 2020-01-09 | End: 2020-01-09 | Stop reason: SDUPTHER

## 2020-01-09 RX ORDER — SODIUM CHLORIDE 9 MG/ML
INJECTION, SOLUTION INTRAVENOUS CONTINUOUS PRN
Status: DISCONTINUED | OUTPATIENT
Start: 2020-01-09 | End: 2020-01-09 | Stop reason: SDUPTHER

## 2020-01-09 RX ADMIN — MORPHINE SULFATE 2 MG: 2 INJECTION, SOLUTION INTRAMUSCULAR; INTRAVENOUS at 03:20

## 2020-01-09 RX ADMIN — HYDROCODONE BITARTRATE AND ACETAMINOPHEN 2 TABLET: 5; 325 TABLET ORAL at 01:07

## 2020-01-09 RX ADMIN — ONDANSETRON 4 MG: 2 INJECTION, SOLUTION INTRAMUSCULAR; INTRAVENOUS at 14:36

## 2020-01-09 RX ADMIN — PHENYLEPHRINE HYDROCHLORIDE 100 MCG: 10 INJECTION INTRAVENOUS at 15:10

## 2020-01-09 RX ADMIN — SIMVASTATIN 20 MG: 20 TABLET, FILM COATED ORAL at 20:07

## 2020-01-09 RX ADMIN — HYDROCODONE BITARTRATE AND ACETAMINOPHEN 2 TABLET: 5; 325 TABLET ORAL at 09:07

## 2020-01-09 RX ADMIN — ONDANSETRON 4 MG: 2 INJECTION INTRAMUSCULAR; INTRAVENOUS at 01:07

## 2020-01-09 RX ADMIN — ANTI-FUNGAL POWDER MICONAZOLE NITRATE TALC FREE: 1.42 POWDER TOPICAL at 20:12

## 2020-01-09 RX ADMIN — FENTANYL CITRATE 25 MCG: 50 INJECTION INTRAMUSCULAR; INTRAVENOUS at 14:24

## 2020-01-09 RX ADMIN — SODIUM CHLORIDE: 9 INJECTION, SOLUTION INTRAVENOUS at 14:37

## 2020-01-09 RX ADMIN — CEFTRIAXONE SODIUM 2 G: 2 INJECTION, POWDER, FOR SOLUTION INTRAMUSCULAR; INTRAVENOUS at 12:45

## 2020-01-09 RX ADMIN — APIXABAN 2.5 MG: 2.5 TABLET, FILM COATED ORAL at 20:07

## 2020-01-09 RX ADMIN — FENTANYL CITRATE 25 MCG: 50 INJECTION INTRAMUSCULAR; INTRAVENOUS at 14:16

## 2020-01-09 RX ADMIN — CARVEDILOL 12.5 MG: 12.5 TABLET, FILM COATED ORAL at 09:08

## 2020-01-09 RX ADMIN — INSULIN LISPRO 3 UNITS: 100 INJECTION, SOLUTION INTRAVENOUS; SUBCUTANEOUS at 20:12

## 2020-01-09 RX ADMIN — INSULIN GLARGINE 20 UNITS: 100 INJECTION, SOLUTION SUBCUTANEOUS at 20:24

## 2020-01-09 RX ADMIN — Medication 15 MG: at 14:59

## 2020-01-09 RX ADMIN — HYDROMORPHONE HYDROCHLORIDE 1 MG: 1 INJECTION, SOLUTION INTRAMUSCULAR; INTRAVENOUS; SUBCUTANEOUS at 21:15

## 2020-01-09 RX ADMIN — SODIUM CHLORIDE, PRESERVATIVE FREE 10 ML: 5 INJECTION INTRAVENOUS at 20:12

## 2020-01-09 RX ADMIN — SODIUM CHLORIDE: 9 INJECTION, SOLUTION INTRAVENOUS at 03:18

## 2020-01-09 RX ADMIN — ONDANSETRON 4 MG: 2 INJECTION INTRAMUSCULAR; INTRAVENOUS at 12:47

## 2020-01-09 RX ADMIN — LIDOCAINE HYDROCHLORIDE 50 MG: 10 INJECTION, SOLUTION EPIDURAL; INFILTRATION; INTRACAUDAL; PERINEURAL at 14:19

## 2020-01-09 RX ADMIN — MORPHINE SULFATE 2 MG: 2 INJECTION, SOLUTION INTRAMUSCULAR; INTRAVENOUS at 20:07

## 2020-01-09 RX ADMIN — ANTI-FUNGAL POWDER MICONAZOLE NITRATE TALC FREE: 1.42 POWDER TOPICAL at 09:46

## 2020-01-09 RX ADMIN — SODIUM CHLORIDE: 9 INJECTION, SOLUTION INTRAVENOUS at 13:25

## 2020-01-09 RX ADMIN — Medication 0.2 MG: at 14:39

## 2020-01-09 RX ADMIN — PROPOFOL 200 MG: 10 INJECTION, EMULSION INTRAVENOUS at 14:19

## 2020-01-09 RX ADMIN — LIDOCAINE HYDROCHLORIDE 3 ML: 20 JELLY TOPICAL at 14:22

## 2020-01-09 RX ADMIN — FENTANYL CITRATE 50 MCG: 50 INJECTION INTRAMUSCULAR; INTRAVENOUS at 14:12

## 2020-01-09 RX ADMIN — HYDROCODONE BITARTRATE AND ACETAMINOPHEN 2 TABLET: 5; 325 TABLET ORAL at 23:33

## 2020-01-09 RX ADMIN — HYDROCODONE BITARTRATE AND ACETAMINOPHEN 2 TABLET: 5; 325 TABLET ORAL at 18:47

## 2020-01-09 ASSESSMENT — PULMONARY FUNCTION TESTS
PIF_VALUE: 12
PIF_VALUE: 12
PIF_VALUE: 13
PIF_VALUE: 11
PIF_VALUE: 13
PIF_VALUE: 13
PIF_VALUE: 12
PIF_VALUE: 14
PIF_VALUE: 14
PIF_VALUE: 0
PIF_VALUE: 13
PIF_VALUE: 11
PIF_VALUE: 12
PIF_VALUE: 13
PIF_VALUE: 9
PIF_VALUE: 13
PIF_VALUE: 12
PIF_VALUE: 12
PIF_VALUE: 13
PIF_VALUE: 13
PIF_VALUE: 0
PIF_VALUE: 12
PIF_VALUE: 5
PIF_VALUE: 0
PIF_VALUE: 12
PIF_VALUE: 14
PIF_VALUE: 12
PIF_VALUE: 13
PIF_VALUE: 9
PIF_VALUE: 14
PIF_VALUE: 14
PIF_VALUE: 12
PIF_VALUE: 13
PIF_VALUE: 12
PIF_VALUE: 12
PIF_VALUE: 13
PIF_VALUE: 12
PIF_VALUE: 13
PIF_VALUE: 12
PIF_VALUE: 18
PIF_VALUE: 16
PIF_VALUE: 13
PIF_VALUE: 12
PIF_VALUE: 13
PIF_VALUE: 12
PIF_VALUE: 13
PIF_VALUE: 12
PIF_VALUE: 12
PIF_VALUE: 13
PIF_VALUE: 12
PIF_VALUE: 12
PIF_VALUE: 13
PIF_VALUE: 13
PIF_VALUE: 12
PIF_VALUE: 13
PIF_VALUE: 13
PIF_VALUE: 10
PIF_VALUE: 5
PIF_VALUE: 12
PIF_VALUE: 13
PIF_VALUE: 12
PIF_VALUE: 14
PIF_VALUE: 13
PIF_VALUE: 12
PIF_VALUE: 13
PIF_VALUE: 12
PIF_VALUE: 13
PIF_VALUE: 12
PIF_VALUE: 13
PIF_VALUE: 12
PIF_VALUE: 12
PIF_VALUE: 13
PIF_VALUE: 14
PIF_VALUE: 13
PIF_VALUE: 12
PIF_VALUE: 8
PIF_VALUE: 0
PIF_VALUE: 0
PIF_VALUE: 12
PIF_VALUE: 12
PIF_VALUE: 13
PIF_VALUE: 13
PIF_VALUE: 0
PIF_VALUE: 12
PIF_VALUE: 13
PIF_VALUE: 12
PIF_VALUE: 13
PIF_VALUE: 13
PIF_VALUE: 0
PIF_VALUE: 10
PIF_VALUE: 12
PIF_VALUE: 13
PIF_VALUE: 12
PIF_VALUE: 13
PIF_VALUE: 14
PIF_VALUE: 13
PIF_VALUE: 13
PIF_VALUE: 12
PIF_VALUE: 12
PIF_VALUE: 13
PIF_VALUE: 0
PIF_VALUE: 12
PIF_VALUE: 13
PIF_VALUE: 12
PIF_VALUE: 0
PIF_VALUE: 13
PIF_VALUE: 8
PIF_VALUE: 14
PIF_VALUE: 13
PIF_VALUE: 12
PIF_VALUE: 9
PIF_VALUE: 13
PIF_VALUE: 12
PIF_VALUE: 0
PIF_VALUE: 13

## 2020-01-09 ASSESSMENT — PAIN SCALES - GENERAL
PAINLEVEL_OUTOF10: 10
PAINLEVEL_OUTOF10: 9
PAINLEVEL_OUTOF10: 7
PAINLEVEL_OUTOF10: 7
PAINLEVEL_OUTOF10: 8
PAINLEVEL_OUTOF10: 1
PAINLEVEL_OUTOF10: 8
PAINLEVEL_OUTOF10: 6

## 2020-01-09 ASSESSMENT — PAIN - FUNCTIONAL ASSESSMENT: PAIN_FUNCTIONAL_ASSESSMENT: 0-10

## 2020-01-09 NOTE — PROGRESS NOTES
[facility] - Progress Note    Today's Date and Time: 1/9/2020 , 11:49 AM    Impression :   · Rt great toe diabetic infection  · Foot cellulitis  · Gas formation in tissues  · S/P I&D per Podiatry 12-31  · DM II not controlled  · CAD s/p stenting x 2  · Non compliance    Recommendations:     · 1-8 Resume Ceftriaxone 2 gm IV q 24 hr. Stop date 1-15-20  · 1-8 Stop Ceftaroline 600 mg IV  q 12 hr Staph isolate is methicillin sensitive  · Continue elevation of the leg and foot with 3-4 pillows. · Kerlex and ACE wraps to the foot and ankle. · Diflucan 200 mg po daily  · Surgical intervention as per Podiatry. Plan for Transmetatarsal amputation tomorrow (1/10). Medical Decision Making/Summary/Discussion:1/9/2020     ·   Infection Control Recommendations   · [place] Precautions    Antimicrobial Stewardship Recommendations     · Targeted therapy  · Restricted medication    Coordination of Outpatient Care:   · Estimated Length of IV antimicrobials: 1-15-20  · Patient will need Midline Catheter Insertion:  No  · Patient will need PICC line Insertion: No  · Patient will need: Home IV , [place],  SNF,  LTAC: No  · Patient will need outpatient wound care: TBD    Chief complaint/reason for consultation:   · Diabetic foot infection, uncontrolled DM      History of Present Illness:   Chalino Youngblood is a 38y.o.-year-old  female who was initially admitted on 12/30/2019. Patient seen at the request of Dr. Felicitas Borjas:    Pt is a 37 yo woman visiting from [place]. She noted some Rt great toe pain on 12-24. By 12-30 the pain had progressed and her foot was swollen and tender so she presented to the ED for further evaluation. Pt also has a history of CAD with multiple stents in place, PE and IDDM. She reports that she lost her insurance 6 months ago and has not been taking her Brilinta, Eliquis or Insulin as a result.      She denies any chills, fevers, methicillin sensitive. Stop Ceftaroline and resume Ceftriaxone - stop date remains 1-15-20    Wound dressing clean. Patient does not have any fevers, chills, cough, shortness of breath, chest pains or palpitations    Initial Culture 20 with Strep Grp B, S aureus, Prevotella, Candida. Difficult to interpret as it was a surface culture. Renal function showing fluctuations    Labs, X rays reviewed: 2020      BUN: 19-->22->23->22->17  Cr: 1.2-->1.41->1.3->1.3->1.31    WBC: 8.9-->11.5->10->8.9->8.1  Hb: 11.0-->9.5->9.1->9.4->9.4  Plat:  235-->277->310->364    CRP 89->151->212->262->257->331.7->251  1-4 ESR >130  Cultures:  Urine:  ·   Blood:  · 12-31 x 2 no growth to date  Sputum :  ·   Wound:  ·  Group B strep, Candida albicans, S aureus, Prevotella      Rt foot 20            Rt foot/Wound:20                          Discussed with patient, RN    I have personally reviewed the past medical history, past surgical history, medications, social history, and family history, and I have updated the database accordingly.   Past Medical History:     Past Medical History:   Diagnosis Date    Hyperlipidemia     Hypertension     Hypothyroidism     Type II or unspecified type diabetes mellitus without mention of complication, not stated as uncontrolled     Urinary incontinence        Past Surgical  History:     Past Surgical History:   Procedure Laterality Date     SECTION      2 times    CHOLECYSTECTOMY      FOOT DEBRIDEMENT Right 2020    incision    FOOT DEBRIDEMENT Right 2020    INCISION AND DRAINAGE RIGHT FOOT, HALLUX AMPUTATION performed by Domonique Melendrez DPM at [place] [address]. Right 2020    I and D     TOE AMPUTATION Right 2020    great toe    TOENAIL EXCISION         Medications:      cefTRIAXone (ROCEPHIN) IV  2 g Intravenous Q24H    insulin glargine  20 Units Subcutaneous Nightly    miconazole   Topical BID    sodium chloride flush  10 mL double vision or blurry vision. No conjunctival inflammation. ENT: No sore throat or runny nose. . No hearing loss, tinnitus or vertigo. Cardiovascular: No chest pain or palpitations. No shortness of breath. No TORRES  Lung: No shortness of breath or cough. No sputum production  Abdomen: Mild nausea, no vomiting, diarrhea, or abdominal pain. Canda Nay No cramps. Genitourinary: No increased urinary frequency, or dysuria. No hematuria. No suprapubic or CVA pain  Musculoskeletal: No muscle aches or pains. No joint effusions, swelling or deformities. Pain controlled  Hematologic: No bleeding or bruising. Neurologic: No headache, weakness, numbness, or tingling. Integument: No rash, no ulcers. Psychiatric: No depression. Endocrine: No polyuria, no polydipsia, no polyphagia. Physical Examination :     Patient Vitals for the past 8 hrs:   BP Temp Temp src Pulse Resp SpO2 Weight   01/09/20 0853 (!) 155/82 97.8 °F (36.6 °C) Oral -- 18 98 % --   01/09/20 5175 -- -- -- -- -- -- 295 lb 12.8 oz (134.2 kg)   01/09/20 0407 (!) 154/87 97.2 °F (36.2 °C) Oral 68 18 97 % --     General Appearance: Awake, alert, and in no apparent distress  Head:  Normocephalic, no trauma  Eyes: Pupils equal, round, reactive to light and accommodation; extraocular movements intact; sclera anicteric; conjunctivae pink. No embolic phenomena. ENT: Oropharynx clear, without erythema, exudate, or thrush. No tenderness of sinuses. Mouth/throat: mucosa pink and moist. No lesions. Dentition in good repair. Neck:Supple, without lymphadenopathy. Thyroid normal, No bruits. Pulmonary/Chest: Clear to auscultation, without wheezes, rales, or rhonchi. No dullness to percussion. Cardiovascular: Regular rate and rhythm without murmurs, rubs, or gallops. Distant heart tones  Abdomen: Soft, non tender. Bowel sounds normal. No organomegaly  All four Extremities: No cyanosis, clubbing, edema, or effusions. Neurologic: No gross sensory or motor deficits.   Skin: Warm and dry with good turgor. With signs of peripheral arterial or venous insufficiency. Rt foot dressing clean and dry    Medical Decision Making -Laboratory:   I have independently reviewed/ordered the following labs:    CBC with Differential:   Recent Labs     01/08/20 0433 01/09/20  0513   WBC 8.9 8.1   HGB 9.4* 9.4*   HCT 29.9* 30.2*    374   LYMPHOPCT 19* 24   MONOPCT 7 7     BMP:   Recent Labs     01/08/20 0433 01/09/20  0513   * 142   K 3.8 3.7    107   CO2 23 25   BUN 22* 17   CREATININE 1.36* 1.31*     Hepatic Function Panel:   Recent Labs     01/08/20 0433 01/09/20  0513   LABALBU 1.3* 1.6*     No results for input(s): RPR in the last 72 hours. No results for input(s): HIV in the last 72 hours. No results for input(s): BC in the last 72 hours.   Lab Results   Component Value Date    MUCUS NOT REPORTED 01/07/2020    RBC 3.18 01/09/2020    TRICHOMONAS NOT REPORTED 01/07/2020    WBC 8.1 01/09/2020    YEAST NOT REPORTED 01/07/2020    TURBIDITY TURBID 01/07/2020     Lab Results   Component Value Date    CREATININE 1.31 01/09/2020    GLUCOSE 126 01/09/2020       Medical Decision Making-Imaging:     EXAMINATION:   MRI OF THE RIGHT HINDFOOT WITHOUT AND WITH CONTRAST, 12/31/2019 4:22 pm       TECHNIQUE:   Multiplanar multisequence MRI of the right foot was performed without and   with the administration of intravenous contrast.       COMPARISON:   Right foot radiographs 12/30/2019.       HISTORY:   ORDERING SYSTEM PROVIDED HISTORY: right foot wound   TECHNOLOGIST PROVIDED HISTORY:   right foot wound   What is the area of interest?->Forefoot   Is the patient pregnant?->No       The patient reported to the MRI tech that she had complaints involving the   heel and midfoot so a hindfoot study was performed.  The clinician has   requested that the study be repeated as a forefoot.       FINDINGS:   PLANTAR FASCIA: Mild chronic thickening of the central cord of the plantar   fascia.  No acute plantar fasciitis or plantar fascia tear.  Severe fatty   atrophy of the abductor digiti minimi muscle.       ACHILLES TENDON: The Achilles tendon is normal in position, morphology and   signal.  No associated bursitis.       BONE MARROW: No fracture or dislocation.  No abnormal marrow space-occupying   lesion.  No osseous erosion.       JOINT SPACES: The tibiotalar and subtalar joints are normal in appearance. The talonavicular and calcaneocuboid joints are unremarkable.  The midfoot is   unremarkable.  No joint effusion.       SYNDESMOTIC LIGAMENTS: The anterior and posterior inferior tibiofibular   ligaments are intact.       LATERAL COLLATERAL LIGAMENT COMPLEX: The anterior talofibular ligament,   calcaneofibular ligament and posterior talofibular ligaments are without   acute abnormality.       DELTOID LIGAMENT COMPLEX:  The superficial and deep components of the deltoid   ligament complex are normal.       SINUS TARSI AND SPRING LIGAMENT: The sinus tarsi fat plug is grossly   preserved.  The spring ligament complex is intact.       MEDIAL TENDONS: The posterior tibialis, flexor digitorum longus and flexor   hallucis longus tendons are intact.       LATERAL TENDONS:  The peroneus longus and brevis tendons are intact.       ANTERIOR TENDONS: The tibialis anterior, extensor hallucis longus and   extensor digitorum longus tendons are normal in position, morphology and   signal.       TARSAL TUNNEL: There are no obstructing lesions within the tarsal tunnel.       MISCELLANEOUS: Circumferential subcutaneous edema about the ankle and along   the dorsum of the midfoot.  No soft tissue ulceration, sinus tract, or   drainable fluid collection.  No abnormal soft tissue mass.           Impression   1. No osteomyelitis or other acute osseous abnormality of the hindfoot.    2. Circumferential subcutaneous edema about the ankle and along the dorsum of   the midfoot compatible with bland edema versus cellulitis.  No deep soft abscess at this time.  This area measures   approximately 1.9 x 0.8 x 2.1 cm.   3. A couple low signal foci are seen within the plantar soft tissues of the   proximal great toe, favored to be vascular structures but possibility of soft   tissue gas is not entirely excluded. 4. Small joint effusion at the 1st MTP joint. Medical Decision Prabgz-Gzcunvya-Givjl:   1/7/2020  5:41 PM - Sammy, Mhpn [facility]     Specimen Information: Foot        Component Collected Lab   Specimen Description 12/31/2019 12:21  [address]   . FOOT RIGHT    Special Requests 12/31/2019 12:21  [address]   NOT REPORTED    Direct Exam 12/31/2019 12:21  [address]   NO NEUTROPHILS SEEN    Direct Exam Abnormal  12/31/2019 12:21 PM [address] [place]    Direct Exam Abnormal  12/31/2019 12:21 PM [address]    Culture Abnormal  12/31/2019 12:21 PM [address] MODERATE GROWTH    Culture Abnormal  12/31/2019 12:21 PM [address]    Culture Abnormal  12/31/2019 12:21 PM [place] This isolate is methicillin susceptible. Culture 12/31/2019 12:21  [address]    Culture Abnormal  12/31/2019 12:21  [address]   This is a mixed culture of organisms, which may represent colonization or contamination.  Notify the laboratory within 7 days for further workup.  Susceptibilities have not been performed.    Culture Abnormal  12/31/2019 12:[address]  [address] (BACTEROIDES BIVIUS) BIVIA LIGHT GROWTH BETA LACTAMASE POSITIVE          Medical Decision Making-Other:     Note:  · Labs, medications, radiologic studies were reviewed with personal review of films  · Moderate Large amounts of data were reviewed  · Discussed with nursing Staff, Discharge planner  · Infection Control and Prevention measures reviewed  · All prior entries were reviewed  · Administer medications as ordered  · Prognosis: Fair  · Discharge planning reviewed  · Follow up as outpatient. Thank you for allowing us to participate in the care of this patient. Please call with questions.     Jeremy Wheeler  Pager: [phone] - Office: [phone]

## 2020-01-09 NOTE — PROGRESS NOTES
Mary George 18    Progress Note    1/9/2020    2:44 PM    Name:   Angelica Mcgee  MRN:     [id number]     Acct:      [de-identified]   Room:   [facility] OR [place] [facility]/NONE  IP Day:  10  Admit Date:  12/30/2019  2:09 PM    PCP:   KAYLEE Pitts CNP  Code Status:  Full Code    Subjective:     C/C:   Chief Complaint   Patient presents with    Foot Pain     complaining of right foot pain to bottom of foot near crease of great toe and lateral right foot, she states that she started to notice swelling to dorsal aspect of foot with mild redness and pain    Leg Pain     complaining of right lower extremity pain to right medial aspect      Interval History Status: not changed. Pt seen and examined this morning. No acute events overnight. CRP continues to downtrend. Patient with episode of nausea/vomiting last night while NPO after admin of narcotics. Feeling better this morning. Glucoses well controlled this morning. She is scheduled for OR today for either amputation or wound-vac placement. Vitals are stable. Pain is controlled. She notes that she currently is without insurance and has been without her medications for some time as she could not afford them. She is actively (during my encounter) on the phone with her insurance company. Denies fever, chills, back pain, chest pain. Brief History:     Per my associate:    Patient is a 38 yr old female who presents to ED with c/p right foot pain.  Right great toe noted to be deep red/pruple in color with swelling extending into dorsum of foot along with red/purple coloring.  Pulses are palpable, brisk cap refill, and warmth noted.  Ms. Kirill Rg denies any injury to the affected toe/foot, denies any fevers, chills, N/V/D andstates the symptoms have been ongoing for approx 24 hrs.  She has baseline neuropathy and denies any changes in N/T.  Presenting labs with elevated inflammatory markers along with 01/08/20  0433 01/08/20  0751 01/08/20  1249 01/08/20  1702 01/08/20  2054 01/09/20  0513 01/09/20  0854 01/09/20  1141   LABALBU 1.3*  --   --   --   --  1.6*  --   --    POCGLU  --  189* 188* 188* 165*  --  105 104     ABG:  Lab Results   Component Value Date    FIO2 INFORMATION NOT PROVIDED 03/21/2015     Lab Results   Component Value Date/Time    SPECIAL NOT REPORTED 12/31/2019 12:22 PM    SPECIAL NOT REPORTED 12/31/2019 12:22 PM     Lab Results   Component Value Date/Time    CULTURE DUPLICATE ORDER 97/16/5972 12:22 PM       Radiology:  Mikey Nowak Foot Right (min 3 Views)    Result Date: 1/6/2020  1. Soft tissue swelling and gas in the great toe, extending to the level of the 1st metatarsal head, concerning for gas gangrene. 2.  No radiographic evidence of osteomyelitis. Us Renal Complete    Result Date: 1/6/2020  Unremarkable ultrasound of the kidneys and urinary bladder. Mri Foot Right W Wo Contrast    Result Date: 1/2/2020  1. No evidence of osteomyelitis in the right forefoot. 2. Focal confluent soft tissue edema at the plantar aspect of the proximal great toe, possibly focal cellulitis or phlegmon. No definite organized fluid collection to indicate abscess at this time. This area measures approximately 1.9 x 0.8 x 2.1 cm. 3. A couple low signal foci are seen within the plantar soft tissues of the proximal great toe, favored to be vascular structures but possibility of soft tissue gas is not entirely excluded. 4. Small joint effusion at the 1st MTP joint.        Physical Examination:        General appearance:  alert, cooperative and no distress, morbidly obese female  Mental Status:  oriented to person, place and time and normal affect  Lungs:  clear to auscultation bilaterally, normal effort  Heart:  regular rate and rhythm, no murmur  Abdomen:  soft, nontender, nondistended, normal bowel sounds, no masses, hepatomegaly, splenomegaly  Extremities:  no edema, redness, tenderness in the calves; right foot we need to know if patient will have insurance at discharge. She will need multiple medications, including antiplatelet therapy, anticoagulation, anti-diabetic medications, antibiotics  8. Continue to monitor fluid status in the setting in chronic diastolic heart failure - appears compensated at this moment in time. 9. Hold parameters placed on coreg; hold lisinopril tonight  10. ISS and Lantus - giving half dose of Lantus (20 units) tonight again secondary to poor PO intake throughout the day today. Will likely restart 40 units tomorrow. 11. Eliquis for DVT ppx (hx of DVT) - resume at discretion of podiatry  12.  Discharge planning - patient hoping to go home with home health care    Xi Rubin,   1/9/2020  2:44 PM

## 2020-01-09 NOTE — PROGRESS NOTES
[facility] - Progress Note    Today's Date and Time: 1/9/2020 , 5:13 PM    Impression :   · Rt great toe diabetic infection  · Foot cellulitis  · Gas formation in tissues  · S/P I&D per Podiatry 12-31  · DM II not controlled  · CAD s/p stenting x 2  · Non compliance    Recommendations:     · 1-8 Resume Ceftriaxone 2 gm IV q 24 hr. Stop date 1-15-20  · 1-8 Stop Ceftaroline 600 mg IV  q 12 hr Staph isolate is methicillin sensitive  · Continue elevation of the leg and foot with 3-4 pillows. · Kerlex and ACE wraps to the foot and ankle. · Diflucan 200 mg po daily  · Surgical intervention as per Podiatry. Medical Decision Making/Summary/Discussion:1/9/2020     ·   Infection Control Recommendations   · [place] Precautions    Antimicrobial Stewardship Recommendations     · Targeted therapy  · Restricted medication    Coordination of Outpatient Care:   · Estimated Length of IV antimicrobials: 1-15-20  · Patient will need Midline Catheter Insertion:  No  · Patient will need PICC line Insertion: No  · Patient will need: Home IV , [place],  SNF,  LTAC: No  · Patient will need outpatient wound care: TBD    Chief complaint/reason for consultation:   · Diabetic foot infection, uncontrolled DM      History of Present Illness:   Maggi Landeros is a 38y.o.-year-old  female who was initially admitted on 12/30/2019. Patient seen at the request of Dr. Greg Prajapati:    Pt is a 37 yo woman visiting from [place]. She noted some Rt great toe pain on 12-24. By 12-30 the pain had progressed and her foot was swollen and tender so she presented to the ED for further evaluation. Pt also has a history of CAD with multiple stents in place, PE and IDDM. She reports that she lost her insurance 6 months ago and has not been taking her Brilinta, Eliquis or Insulin as a result.      She denies any chills, fevers, night sweats or malaise prior to presenting to the ED.    In the ED she was found to have abscess at the base of the Rt great toe with erythema and fluctuance noted. The wound did not penetrate to the bone, no induration or crepitus noted. , .5    Pt was admitted, started on Zosyn and Vancomycin, and seen by podiatry, and I&D was performed with serosanguinous drainage noted. MRI's of the foot showed:  No osteomyelitis or other acute osseous abnormality of the hindfoot. 2. Circumferential subcutaneous edema about the ankle and along the dorsum of   the midfoot compatible with bland edema versus cellulitis.  No deep soft   tissue ulceration or drainable fluid collection. 1. No evidence of osteomyelitis in the right forefoot. 2. Focal confluent soft tissue edema at the plantar aspect of the proximal   great toe, possibly focal cellulitis or phlegmon.  No definite organized   fluid collection to indicate abscess at this time.  This area measures   approximately 1.9 x 0.8 x 2.1 cm.   3. A couple low signal foci are seen within the plantar soft tissues of the   proximal great toe, favored to be vascular structures but possibility of soft   tissue gas is not entirely excluded. 4. Small joint effusion at the 1st MTP joint. Wound cultures are showing Grp B strep and Candida. 1-6 pt to OR for Rt hallux amputation and I&D    1-8 Wound is clean, plan for TMA on 1-10-20  Staph isolate from wound is methicillin sensitive. Stop Ceftaroline and resume Ceftriaxone - stop date remains 1-15-20    CURRENT EXAMINATION: 1/9/2020    Afebrile  VS stable    The patient seen and evaluated at bedside. Patient developed drainage of pus from foot on 1-6-20   Rt Foot X ray now shows gas formation which was not present when MRI was done on 1-4-20. She was taken to the OR on 1-6 for Rt Hallux amputation and I&D  Plan for Transmetatarsal amputation on 1-9 per Pt and Podiatry. Staph isolate from wound is methicillin sensitive.  Stop Ceftaroline and resume Intravenous BID    [MAR Hold] carvedilol  12.5 mg Oral BID WC    [MAR Hold] aspirin  81 mg Oral Daily    [MAR Hold] levothyroxine  50 mcg Oral Daily    [MAR Hold] simvastatin  20 mg Oral Nightly    [MAR Hold] apixaban  2.5 mg Oral BID    [MAR Hold] insulin lispro  0-18 Units Subcutaneous TID WC    [MAR Hold] insulin lispro  0-9 Units Subcutaneous Nightly       Social History:     Social History     Socioeconomic History    Marital status:      Spouse name: Not on file    Number of children: Not on file    Years of education: Not on file    Highest education level: Not on file   Occupational History    Not on file   Social Needs    Financial resource strain: Not on file    Food insecurity:     Worry: Not on file     Inability: Not on file    Transportation needs:     Medical: Not on file     Non-medical: Not on file   Tobacco Use    Smoking status: Former Smoker     Packs/day: 0.10   Substance and Sexual Activity    Alcohol use: No    Drug use: No    Sexual activity: Not on file   Lifestyle    Physical activity:     Days per week: Not on file     Minutes per session: Not on file    Stress: Not on file   Relationships    Social connections:     Talks on phone: Not on file     Gets together: Not on file     Attends Confucianism service: Not on file     Active member of club or organization: Not on file     Attends meetings of clubs or organizations: Not on file     Relationship status: Not on file    Intimate partner violence:     Fear of current or ex partner: Not on file     Emotionally abused: Not on file     Physically abused: Not on file     Forced sexual activity: Not on file   Other Topics Concern    Not on file   Social History Narrative    Not on file       Family History:     Family History   Problem Relation Age of Onset    Stroke Father     Heart Disease Other         Allergies:   Patient has no known allergies. Review of Systems:   Constitutional: No fevers or chills.  No systemic complaints. Feeling better today  Head: No headaches  Eyes: No double vision or blurry vision. No conjunctival inflammation. ENT: No sore throat or runny nose. . No hearing loss, tinnitus or vertigo. Cardiovascular: No chest pain or palpitations. No shortness of breath. No TORERS  Lung: No shortness of breath or cough. No sputum production  Abdomen: Mild nausea, no vomiting, diarrhea, or abdominal pain. [place] No cramps. Genitourinary: No increased urinary frequency, or dysuria. No hematuria. No suprapubic or CVA pain  Musculoskeletal: No muscle aches or pains. No joint effusions, swelling or deformities. Pain controlled  Hematologic: No bleeding or bruising. Neurologic: No headache, weakness, numbness, or tingling. Integument: No rash, no ulcers. Psychiatric: No depression. Endocrine: No polyuria, no polydipsia, no polyphagia. Physical Examination :     Patient Vitals for the past 8 hrs:   BP Temp Temp src Pulse Resp SpO2   01/09/20 1700 (!) 161/83 -- -- 71 10 100 %   01/09/20 1645 (!) 152/80 -- -- 70 11 99 %   01/09/20 1630 (!) 143/76 -- -- 72 15 98 %   01/09/20 1620 138/79 97.7 °F (36.5 °C) Temporal 72 13 99 %   01/09/20 1312 (!) 152/82 97 °F (36.1 °C) Temporal 66 18 99 %     General Appearance: Awake, alert, and in no apparent distress  Head:  Normocephalic, no trauma  Eyes: Pupils equal, round, reactive to light and accommodation; extraocular movements intact; sclera anicteric; conjunctivae pink. No embolic phenomena. ENT: Oropharynx clear, without erythema, exudate, or thrush. No tenderness of sinuses. Mouth/throat: mucosa pink and moist. No lesions. Dentition in good repair. Neck:Supple, without lymphadenopathy. Thyroid normal, No bruits. Pulmonary/Chest: Clear to auscultation, without wheezes, rales, or rhonchi. No dullness to percussion. Cardiovascular: Regular rate and rhythm without murmurs, rubs, or gallops. Distant heart tones  Abdomen: Soft, non tender.  Bowel sounds normal. No Circumferential subcutaneous edema about the ankle and along the dorsum of   the midfoot compatible with bland edema versus cellulitis.  No deep soft   tissue ulceration or drainable fluid collection.         EXAMINATION:   MRI OF THE RIGHT FOREFOOT WITHOUT AND WITH CONTRAST, 1/2/2020 10:00 am       TECHNIQUE:   Multiplanar multisequence MRI of the right forefoot was performed without and   with the administration of intravenous contrast.       COMPARISON:   12/31/2019 hindfoot MRI       HISTORY:   ORDERING SYSTEM PROVIDED HISTORY: Wound   TECHNOLOGIST PROVIDED HISTORY:   Wound   What is the area of interest?->Forefoot   Is the patient pregnant?->No       Wound at the plantar aspect of the great toe.       FINDINGS:   There is mild subcutaneous edema in the dorsal and plantar forefoot.  Focal   confluent soft tissue edema measuring approximately 1.9 x 0.8 x 2.1 cm is   seen in the soft tissues at the plantar aspect of the proximal great toe,   superficial to the flexor tendon, adjacent to the proximal phalanx.  There is   appears to be intervening fat on the T1 weighted sequences.  No definite   organized fluid collection identified in the soft tissues.  No abnormal   enhancement.  There are a couple low signal foci within the plantar soft   tissues of the great toe, somewhat linear appearance, suggesting a vascular   structure, but possibility of soft tissue gas is not excluded. Sheliah Cheers is a   small effusion at the 1st MTP joint.  No marrow signal abnormality.       There is a small amount of fluid in the 1st and 3rd intermetatarsal bursa. No abnormal intermetatarsal enhancing mass.       There is mild increased signal within the intrinsic muscles of the foot. There is mild scattered fatty atrophy of the intrinsic muscles.       The visualized portions of the flexor and extensor tendons are intact.           Impression   1. No evidence of osteomyelitis in the right forefoot.    2. Focal confluent soft tissue GROWTH BETA LACTAMASE POSITIVE          Medical Decision Making-Other:     Note:  · Labs, medications, radiologic studies were reviewed with personal review of films  · Moderate Large amounts of data were reviewed  · Discussed with nursing Staff, Discharge planner  · Infection Control and Prevention measures reviewed  · All prior entries were reviewed  · Administer medications as ordered  · Prognosis: Fair  · Discharge planning reviewed  · Follow up as outpatient. Thank you for allowing us to participate in the care of this patient. Please call with questions.     Sonia Berger MD  Pager: [phone] - Office: [phone]

## 2020-01-09 NOTE — H&P
carvedilol  12.5 mg Oral BID     aspirin  81 mg Oral Daily    levothyroxine  50 mcg Oral Daily    simvastatin  20 mg Oral Nightly    apixaban  2.5 mg Oral BID    insulin lispro  0-18 Units Subcutaneous TID WC    insulin lispro  0-9 Units Subcutaneous Nightly     Continuous Infusions:   sodium chloride 75 mL/hr at 01/09/20 0318    dextrose       PRN Meds:.tiZANidine, morphine, HYDROcodone 5 mg - acetaminophen, diphenhydrAMINE, sodium chloride flush, sodium chloride flush, potassium chloride **OR** potassium alternative oral replacement **OR** potassium chloride, magnesium sulfate, magnesium hydroxide, nicotine, ondansetron, acetaminophen, glucose, dextrose, glucagon (rDNA), dextrose  Allergies  has No Known Allergies. Family History    family history includes Heart Disease in an other family member; Stroke in her father.     Family Status   Relation Name Status    Father  (Not Specified)    Other Grandma (Not Specified)         Social History  Social History     Socioeconomic History    Marital status:      Spouse name: Not on file    Number of children: Not on file    Years of education: Not on file    Highest education level: Not on file   Occupational History    Not on file   Social Needs    Financial resource strain: Not on file    Food insecurity:     Worry: Not on file     Inability: Not on file    Transportation needs:     Medical: Not on file     Non-medical: Not on file   Tobacco Use    Smoking status: Former Smoker     Packs/day: 0.10   Substance and Sexual Activity    Alcohol use: No    Drug use: No    Sexual activity: Not on file   Lifestyle    Physical activity:     Days per week: Not on file     Minutes per session: Not on file    Stress: Not on file   Relationships    Social connections:     Talks on phone: Not on file     Gets together: Not on file     Attends Pentecostalism service: Not on file     Active member of club or organization: Not on file     Attends meetings failure (HCC)    Acute right eye pain    Cellulitis of right foot    CAROL[facility]acute kidney injury) ([facility] [address]    Cellulitis of foot    Type 2 diabetes mellitus with right diabetic foot infection ([facility] [address]    Septic arthritis of right foot (HCC)    Other proteinuria    Hypoalbuminemia               IMPRESSIONS:   1. Right DM  foot wound   2. does not have any pertinent problems on file. [facility]  Electronically signed 1/9/2020 at 12:53 PM       Scheduled for:   Right foot transmetatarsal amputation , ?  Right  achilles tendon lengthing

## 2020-01-09 NOTE — BRIEF OP NOTE
PODIATRY BRIEF OP NOTE    PATIENT NAME: Arnold Jackson DATE: 1975  -  38 y.o. female  MRN: [id number]  DATE: 1/9/2020  BILLING #: [id number]    Surgeon(s):  Paul Lee DPM     ASSISTANTS: Atiya Chong DPM    PRE-OP DIAGNOSIS:   1. S/p incision and drainage with hallux amputation, right foot  2. Gas gangrene, right foot  3. Type 2 Diabetes with associated peripheral neuropathy  4. Equinus, right foot    POST-OP DIAGNOSIS: Same as above. PROCEDURE:   1. Transmetatarsal amputation, right foot  2. Achilles tendon lengthening, right foot    ANESTHESIA: MAC    HEMOSTASIS: Pneumatic thigh tourniquet @ 300 mmHg for 42 minutes. ESTIMATED BLOOD LOSS: Less than 50 cc. MATERIALS: 2-0 Vicryl, 3-0 Vicryl, 3-0 Nylon  * No implants in log *    INJECTABLES: 10 cc of 1:1 mix of 0.5% marcaine plain and 1% lidocaine plain     SPECIMEN:   ID Type Source Tests Collected by Time Destination   A : right forefoot Bone Foot SURGICAL PATHOLOGY Paul Lee DPM 1/9/2020 6619        COMPLICATIONS: None    FINDINGS: Nonviable soft tissue and bone noted to the right forefoot; good bleeding bone noted after transmetatarsal amputation. No purulent drainage found.     Enrike Powell DPM   Podiatric Medicine & Surgery   1/9/2020 at 4:19 PM

## 2020-01-09 NOTE — PROGRESS NOTES
Progress Note  Podiatric Medicine and Surgery     Subjective     CC: R foot diabetic wound    POD #3 s/p I&D w/ R hallux amp (DOS: 1/6/20). Patient seen and evaluated in pre-op. Afebrile, VSS, no leukocytosis. Denies n/v/f/c, relates R foot pain controlled. HPI :  Angelica Mcgee is a 38 y.o. female with a past medical history of DM2 with neuropathy was seen at [facility] for right foot pain and wound. Pt states that about 1 week ago her right great toe became very painful and this morning she noted worsening pain and new redness of her right great toe. Pt denies any trauma or stepping on anything sharp. She notes her redness progressively has moved up towards her ankle. Patient is originally from [place] and has moved here, she currently does not have her insulin. Pt is also complaining of calf pain. She has a history of a DVT and PE. Pt denies any N/V/F/C/CP/SOB.     PCP is KAYLEE Pitts CNP    ROS: Denies N/V/F/C/SOB/CP. Otherwise negative except at stated in the HPI.      Medications:  Scheduled Meds:   cefTRIAXone (ROCEPHIN) IV  2 g Intravenous Q24H    insulin glargine  20 Units Subcutaneous Nightly    miconazole   Topical BID    sodium chloride flush  10 mL Intravenous BID    carvedilol  12.5 mg Oral BID WC    aspirin  81 mg Oral Daily    levothyroxine  50 mcg Oral Daily    simvastatin  20 mg Oral Nightly    apixaban  2.5 mg Oral BID    insulin lispro  0-18 Units Subcutaneous TID     insulin lispro  0-9 Units Subcutaneous Nightly       Continuous Infusions:   sodium chloride 75 mL/hr at 01/09/20 0318    dextrose         PRN Meds:tiZANidine, morphine, HYDROcodone 5 mg - acetaminophen, diphenhydrAMINE, sodium chloride flush, sodium chloride flush, potassium chloride **OR** potassium alternative oral replacement **OR** potassium chloride, magnesium sulfate, magnesium hydroxide, nicotine, ondansetron, acetaminophen, glucose, dextrose, glucagon (rDNA), dextrose    Objective Vitals:  Patient Vitals for the past 8 hrs:   BP Temp Temp src Resp SpO2 Weight   20 0853 (!) 155/82 97.8 °F (36.6 °C) Oral 18 98 % --   20 1883 -- -- -- -- -- 295 lb 12.8 oz (134.2 kg)     Average, Min, and Max for last 24 hours Vitals:  TEMPERATURE:  Temp  Av.6 °F (36.4 °C)  Min: 97.2 °F (36.2 °C)  Max: 97.8 °F (36.6 °C)    RESPIRATIONS RANGE: Resp  Av  Min: 18  Max: 18    PULSE RANGE: Pulse  Av.3  Min: 68  Max: 72    BLOOD PRESSURE RANGE:  Systolic (53BUN), PUK:082 , Min:136 , JIX:413   ; Diastolic (62NBZ), FNU:16, Min:69, Max:87      PULSE OXIMETRY RANGE: SpO2  Av.7 %  Min: 97 %  Max: 98 %    I/O last 3 completed shifts: In: 2283 [P.O.:720; I.V.:1563]  Out: 900 [Urine:900]    CBC:  Recent Labs     20  05020  0513   WBC 10.0 8.9 8.1   HGB 9.1* 9.4* 9.4*   HCT 29.3* 29.9* 30.2*    364 374   .7* 251.2* 142.6*        BMP:  Recent Labs     20  05020  0433 01/09/20  0513   * 134* 142   K 3.3* 3.8 3.7   CL 99 102 107   CO2 21 23 25   BUN 23* 22* 17   CREATININE 1.31* 1.36* 1.31*   GLUCOSE 260* 231* 126*   CALCIUM 7.9* 7.9* 8.2*        Coags:  No results for input(s): APTT, PROT, INR in the last 72 hours. Lab Results   Component Value Date    SEDRATE >130 (H) 2020     Recent Labs     20  05020  0513   .7* 251.2* 142.6*     Exam from previous, R foot dressing kept intact, OR today    Lower Extremity Physical Exam:    Vascular: DP and PT pulses are palpable. CFT <3 seconds to all digits. Hair growth is absent to the level of the digits. +1 pitting edema to the right foot     Neuro: Saph/sural/SP/DP/plantar sensation diminished to light touch.     Musculoskeletal: Muscle strength is 5/5 to all lower extremity muscle groups on Left, deferred on the right. Gross deformity is absent.   Painful ROM of right ankle     Dermatologic: Open surgical wound present to right foot, s/p right Strep, Candida Albicans, S. Aureus, Prevotella  12/31/19: Blood cx x2 - ngtd  Assessment   Jan Garcias is a 38 y.o. female with   1. S/p R hallux amputation (DOS 1/6/20)  2. S/p I&D, right foot   3. Diabetic wound to the level of subcutaneous tissue, right hallux  4. Cellulitis, right foot  5. DM2 with neuropathy  6. H/o DVT  7. Chronic CHF    Principal Problem:    Cellulitis of right foot  Active Problems:    Other proteinuria    Hypoalbuminemia    Renovascular hypertension    Obesity, morbid, BMI 50 or higher (HCC)    Hypothyroidism    Diabetic polyneuropathy (HCC)    Type 2 diabetes mellitus with neurologic complication, with long-term current use of insulin ([facility])    Chronic diastolic congestive heart failure ([facility])    CAROLYN (acute kidney injury) ([facility] [address]    Cellulitis of foot    Type 2 diabetes mellitus with right diabetic foot infection ([facility] [address]    Septic arthritis of right foot ([facility] [address]  Resolved Problems:    Foot infection    Hyperglycemia    Non compliance w medication regimen    H/O deep venous thrombosis    Plan     · Patient examined and evaluated in pre-op  · Treatment options discussed in detail with the patient  · Medical management per primary  · ID following - IV Ceftriazone & PO Diflucan  · Dressing kept intact to R foot  · NWB to R foot in surgical shoe w/ walker  · Plan for OR today 1/9/20 @1:30PM for R foot TMA   ? Consent signed in chart, R foot marked  ? NPO since midnight & AM AC held  ? Discharge planning post-op pending final ID abx reccs  · Discussed with MARIA GUADALUPE MatamorosM   Podiatric Medicine & Surgery   1/9/2020 at 12:28 PM     I performed a history and physical examination of the patient and discussed management with the resident. I reviewed the residents note and agree with the documented findings and plan of care. Any areas of disagreement are noted on the chart. I was personally present for the key portions of any procedures.  I have documented in the chart those procedures

## 2020-01-09 NOTE — PROGRESS NOTES
Patient showered and water leaked through the wrapped bag around foot dressing. Writer removed wet, old dressing and replaced with same supplies. Podiatry notified via [employer]. No new orders. Podiatry states not to worry about the dressing since they will be taking her to surgery tomorrow. Will continue to monitor.

## 2020-01-09 NOTE — ADT AUTH CERT
Podiatry 12-31   · DM II not controlled   · CAD s/p stenting x 2   · Non compliance       Recommendations:       · D/C Ceftriaxone 2 gm IV q 24 hr.    · Ceftaroline 600 mg IV  q 12 hr, pending exclusion of MRSA. Stop date 1-15-20   · Continue elevation of the leg and foot with 3-4 pillows. · Kerlex and ACE wraps to the foot and ankle. · Diflucan 200 mg po daily   · Surgical debridement as per Podiatry. PER NEPHROLOGY:    This is a 38 y.o. female who presented with right foot pain.  She also noticed deep red/purple color in the great toe with swelling extending into the dorsum of the foot.  She reported no history of any injury/fever/nausea/vomiting/diarrhea.  She has history of diabetic neuropathy. 1.  Acute kidney injury nonoliguric secondary to prerenal injury in the face of foot infection -evolving. Baseline creatinine around 1   2.  Right great toe diabetic infection/gas formation in tissues status post I&D per podiatry on 31st December   3.  Type 2 diabetes   4.  Essential hypertension   5.  Coronary artery disease status post stent placement in the past       PLAN:       1.  Continue IV fluids   2.  Ultrasound of the kidneys along with urine studies   3.  Avoid nephrotoxins. Hold Metformin AND ACE-I   4.  Antibiotics as per estimated GFR       Extremities Arteries: Lower Arterial Plethysmography, PVR Lower with PPG.     Indications for Study:Ulceration. Patient Status: In Patient.       Conclusions        Summary        Normal PVR at rest of the bilateral lower extremity.    PPG with flatline right great toe and dampened Rt 2nd and bilateral 3rd    toes. RIGHT FOOT XRAY   1.  Soft tissue swelling and gas in the great toe, extending to the level of   the 1st metatarsal head, concerning for gas gangrene. 2.  No radiographic evidence of osteomyelitis. RENAL US   Unremarkable ultrasound of the kidneys and urinary bladder.                      PODIATRY BRIEF OP NOTE       PATIENT NAME: Sarika Sheppard   YOB: 1975  -  41 y.o. female   MRN: [id number]   DATE: 1/6/2020   BILLING #: [id number]              PRE-OP DIAGNOSIS:    1. Diabetic foot infection, right foot   2. Soft tissue gas, right foot   3. Cellulitis, right foot   4. Septic joint, 1st MTPJ, right foot       POST-OP DIAGNOSIS: Same as above.       PROCEDURE:    1. Incision and drainage, right foot   2.  Hallux amputation, right foot       ANESTHESIA: MAC       HEMOSTASIS: Tamponade       ESTIMATED BLOOD LOSS: Less than 25 cc.       MATERIALS: None   * No implants in log *        INJECTABLES: 10 cc of 1:1 mix of 0.5% marcaine plain and 1% lidocaine plain        SPECIMEN:    ID Type Source Tests Collected by Time Destination   A : Tissue Toe SURGICAL PATHOLOGY Silvio Matson DPM 1/6/2020 1750             COMPLICATIONS: None        FINDINGS: Nonviable soft tissue and bone found to the area of the right hallux.  Abscess found in first MTPJ, indicative of septic joint.  Sinus track laterally over to the area of the fifth metatarsal head.  Purulent drainage noted to track proximally up into EHL tendon.          TEFLARO IV Q12H, ROCEPHIN IV X1, DIFLUCAN PO X1, IVF 75 ML/HR, NORCO X3, MORPHINE IV X1   DAILY WT, FSBS 4XD, EPC, STRICT I AND O, VS,          Results for Oc Brandon (MRN [id number]) as of 1/8/2020 10:38      1/6/2020 03:50   BUN: 22 (H)   Creatinine: 1.41 (H)   GFR Non-: 40 (L)   GFR : 49 (L)      1/6/2020 10:00      1/6/2020 12:22   POC Glucose: 218 (H)      1/6/2020 15:30      1/6/2020 18:29   POC Glucose: 237 (H)      1/6/2020 19:59   POC Glucose: 212 (H)      VS 36.8, 87, 20, 155/73, 95% RA,                       Diabetes - Care Day 7 (1/5/2020) by Marleen Da Silva RN         Review Status Review Entered   Completed 1/8/2020 10:48       Criteria Review      Care Day: 7 Care Date: 1/5/2020 Level of Care:    Guideline Day 2    Level Of Care    (X) Floor    1/8/2020 10:48 AM EST by improved    ( ) * Blood glucose under acceptable control or improved    ( ) * Dehydration absent    ( ) * Electrolyte abnormalities absent or improved    ( ) * Renal function at baseline or improved    Activity    (X) * Ambulatory    Routes    (X) * Oral hydration, and medications    Interventions    (X) Complete IV volume, replacement,    1/8/2020 10:42 AM EST by Darrel Dozier      IVF 75 ML/HR    ( ) Serum glucose, serum ketones, chemistries, ABGs or venous pH measurements, urinalysis    1/8/2020 10:42 AM EST by Darrel Dozier      RENAL FUNC    (X) Bedside glucose monitoring    1/8/2020 10:42 AM EST by Darrel Dozier      FSBS 4XD    Medications    (X) * IV insulin absent    ( ) * Outpatient diabetic medication regimen initiated    (X) Subcutaneous insulin    1/8/2020 10:42 AM EST by Darrel Dozier      LANTUS  HUMALOG X4    * Milestone   Additional Notes   1/4/2020      PER ID:      · Rt great toe diabetic infection   · Right foot cellulitis   ? S/P I&D per Podiatry 12-31   · DM II not controlled   · CAD s/p stenting x 2   · Non compliance       Recommendations:       · Treating the diabetic foot infection with p.o. Diflucan and  iv ceftriaxone,    · Foot cellulitis not better, might even be slightly spreading-   · Pus expressed by podiatry today from the bottom of the hallux   · Agree w podiatry about a I/D looking for deeper collection of pus, despite the MRI findings   · Elevate the foot, wound care      PER PODIATRY   Hypertension, other vital signs stable   Patient reported vomiting last night but states this resolved when they switched her to oral pain medicine   Patient reports pain to right foot controlled with pain medicine   Patient denies nausea, fever, chills, shortness of breath, chest pain   Vascular: DP and PT pulses are palpable.  CFT <3 seconds to all digits.  Hair growth is absent to the level of the digits.  +1 pitting edema to the right foot       Neuro: Saph/sural/SP/DP/plantar sensation diminished to light touch.       Musculoskeletal: Muscle strength is 5/5 to all lower extremity muscle groups on Left, deferred on the right. Gross deformity is absent. Tenderness to palpation of the plantar and dorsal aspect of the right hallux. Painful ROM of right ankle       Dermatologic: Full thickness pinpoint ulcer #1 located plantar sulcus of right hallux and measures approximately 0.1 cm x 0.1 cm x 0.5 cm. Base is granular. Periwound skin is hyperkeratotic.  Scant purulent drainage noted from the wound. Erythema on dorsum of foot unchanged from previous physical exam. No fluctuance noted. Does not probe to bone, sinus track, or undermine. No fluctuance, crepitus, or induration.   Interdigital maceration absent.        Left foot is warm and dry with no open wounds   · Xrays neg for OM or gas   · MRI of hindfoot: neg for deep abscess in ankle or dorsum or right foot   · MRI of forefoot: neg for OM; soft tissue edema at plantar aspect of proximal great toe, no definite fluid collection-low suspicion for abscess   · Venous duplex negative for DVT   · Medical management by primary team. Appreciate recommendations   · ID following-cefazolin 2 g IV every 8 hours, Diflucan 200 mg p.o. daily   · Podiatry recommends not discharging until evaluation tomorrow due to purulent drainage from foot wound. Will check for deeper abscess in the AM.   · Patient is visiting from [place]. Spoke with her about importance of following up with podiatrist when she gets back to [place]. Explained signs of infection also and to present to ED when she gets back to [place] if she hasn't been able to follow-up yet with podiatrist.   · Continue daily dressing changes to right foot: betadine to right hallux wound, DSD   · NWB to Right lower extremity in surgical shoe.        ROCEPHIN IV Q24H, NORCO X3, BENADRYL X2, DILAUDID IV X1, AAT, DAILY WT, FSBS 4XD, EPC, STRICT I AND O, VS.       Results for Yokasta Lee (MRN [id number]) as of baseline    ( ) * Acidosis absent or improved    ( ) * Blood glucose under acceptable control or improved    1/7/2020 11:13 AM EST by Darrel Dozier          ( ) * Dehydration absent    ( ) * Electrolyte abnormalities absent or improved    ( ) * Renal function at baseline or improved    Activity    (X) * Ambulatory    1/7/2020 11:13 AM EST by Darrel Dozier      UP WITH ASSIST    Routes    (X) * Oral hydration, and medications    (X) [place] diet, advance as tolerated    Interventions    (X) Complete IV volume, replacement,    1/7/2020 11:13 AM EST by Darrel Dozier      IVF 75 ML/HR    (X) Serum glucose, serum ketones, chemistries, ABGs or venous pH measurements, urinalysis    (X) Bedside glucose monitoring    1/7/2020 11:13 AM EST by Darrel Dozier      FSBS 4XD    Medications    (X) * IV insulin absent    ( ) * Outpatient diabetic medication regimen initiated    (X) Subcutaneous insulin    1/7/2020 11:13 AM EST by Darrel Dozier      LANTUS AND HUMALOG    * Milestone   Additional Notes   1/2/2020      PER PODIATRY:   Dermatologic: Full thickness pinpoint ulcer #1 located plantar sulcus of right hallux and measures approximately 0.1 cm x 0.1 cm x 0.5 cm. Base is granular. Periwound skin is hyperkeratotic.  Serosanguinous drainage noted with no associated mal odor. Erythema with dorsal streaking improved from previous exam- minimal associated increase in warmth. No fluctuance noted. Does not probe to bone, sinus track, or undermine. No fluctuance, crepitus, or induration.   Interdigital maceration absent.        Left foot is warm and dry with no open wounds   · Xrays neg for OM or gas   · MRI of hindfoot: neg for deep abscess in ankle or dorsum or right foot   · F/u MRI of forefoot to evaluate for deep abscess- pending   · Venous duplex negative for DVT   · Medical management by primary team. Appreciate recommendations   · Abx: Vancomycin   ? ID following, appreciate recommendations   ?  Wound cx: GPC, GNR      · Will monitor on antibiotics and f/u forefoot MRI   · Continue daily dressing changes to right foot: betadine to right hallux wound, DSD   · NWB to Right lower extremity in surgical shoe. Surgical shoe ordered   =================================================   PER MEDICINE:   1. Left foot cellulitis. Continue Vanco and Zosyn. Pharm to dose, will obtain ID recommendation for discharge. Continue wound care. Pod following   2. CAROLYN. Maybe related to Vanco. Will hold and get ID input. IVF. Repeat BMP in the AM   3. Chronic diastolic CHF> no acute exacerbation. Continue Coreg and Lisinopril   4. Morbid obesity.  counseled     ==========================================   PER ID   · Rt great toe diabetic infection   · S/P I&D per Podiatry 12-31   · DM II not controlled   · CAD s/p stenting x 2   · Non compliance       Recommendations:       · Cefazolin 2gm IV q 8    · Diflucan 200 mg po daily   =========================================   BP (!) 156/85   Pulse 85   Temp 98.5 °F (36.9 °C) (Oral)   Resp 16   Ht 5' 1\" (1.549 m)   Wt 268 lb 6.4 oz (121.7 kg)   SpO2 96%   BMI 50.71 kg/m²       ELIQUIS, ASA, COREG, GLUGOPHAGE 1GM 2XD, VANCOMYCIN IV Q24H, DILAUDID IV X5  I AND O, DAILY WT, ELEVATE AFFECTED LIMB, EPC, AAT WITH ASSIST.              1/2/2020 04:34   BUN: 19   Creatinine: 1.20 (H)   GFR Non-: 49 (L)   GFR : 59 (L)             Diabetes - Care Day 3 (1/1/2020) by Jovanna Webb RN         Review Status Review Entered   Completed 1/7/2020 11:01       Criteria Review      Care Day: 3 Care Date: 1/1/2020 Level of Care:    Guideline Day 2    Level Of Care    (X) Floor    1/7/2020 11:01 AM EST by Mini Viramontes      ACUTE    Clinical Status    (X) * Hypotension absent    1/7/2020 11:01 AM EST by Mini Viramontes      /79    (X) * Mental status at baseline    ( ) * Acidosis absent or improved    (X) * Blood glucose under acceptable control or improved    ( ) * Dehydration MCH: 29.8   MCHC: 31.9   MPV: 11.4   RDW: 13.3   Platelet Count: 292

## 2020-01-09 NOTE — ANESTHESIA PRE PROCEDURE
Department of Anesthesiology  Preprocedure Note       Name:  Cholo Crowell   Age:  38 y.o.  :  1975                                          MRN:  [id number]         Date:  2020      Surgeon: Rocio Vergara):  Umberto Tanner DPM    Procedure: TRANSMETATARSAL AMPUTATION FOOT, POSSIBLE ACHILLES TENDON LENGTHENING (Right )    Medications prior to admission:   Prior to Admission medications    Medication Sig Start Date End Date Taking? Authorizing Provider   glipiZIDE (GLUCOTROL) 10 MG tablet Take 2 tablets by mouth 2 times daily (before meals) 16   Marcelina Rivera MD   levothyroxine (SYNTHROID) 50 MCG tablet Take 1 tablet by mouth daily 16   Marcelina Rivera MD   carvedilol (COREG) 12.5 MG tablet Take 1 tablet by mouth 2 times daily (with meals) Not sure if using 16   Marcelina Rivera MD   lisinopril-hydrochlorothiazide (PRINZIDE;ZESTORETIC) 20-25 MG per tablet Take 1 tablet by mouth daily 16   Marcelina Rivera MD   metFORMIN (GLUCOPHAGE) 1000 MG tablet Take 1 tablet by mouth 2 times daily (with meals) 16   Marcelina Rivera MD   glucose monitoring kit (FREESTYLE) monitoring kit 1 kit by Does not apply route daily as needed (monitor blood glucose levels) 16   Ramana Benjamin MD   Glucose Blood (BLOOD GLUCOSE TEST STRIPS) STRP Check blood sugars three times daily 16   Ramana Benjamin MD   benzocaine-menthol (CEPACOL SORE THROAT EX ST) 15-3.6 MG lozenge Take 1 lozenge by mouth 3 times daily as needed for Sore Throat 16   Marleni King DO   ibuprofen (ADVIL;MOTRIN) 800 MG tablet Take 1 tablet by mouth every 8 hours as needed for Pain 16   Jeannie Cueto DO   aspirin 81 MG chewable tablet Take 1 tablet by mouth daily. 3/20/15   Royce Phelps MD   gabapentin (NEURONTIN) 300 MG capsule Take 1 capsule by mouth 3 times daily. 3/20/15   Royce Phelps MD   simvastatin (ZOCOR) 20 MG tablet Take 1 tablet by mouth nightly.  3/20/15   Royce Phelps MD   nystatin (MYCOSTATIN) 505302 UNIT/GM powder Apply topically 4 times daily.  3/20/15   Connor Donaldson MD   Blood Glucose Monitoring Suppl (BLOOD GLUCOSE METER) KIT Test 3 times daily Dx: 250.00  Diabetes Mellitus Non-Insulin Dependent 3/20/15   Connor Donaldson MD   Blood Pressure KIT Use as directed 3/20/15   Connor Donaldson MD       Current medications:    Current Facility-Administered Medications   Medication Dose Route Frequency Provider Last Rate Last Dose    [MAR Hold] cefTRIAXone (ROCEPHIN) 2 g IVPB in D5W 50ml minibag  2 g Intravenous Q24H KAYLEE La -  mL/hr at 01/09/20 1245 2 g at 01/09/20 1245    [MAR Hold] insulin glargine (LANTUS) injection vial 20 Units  20 Units Subcutaneous Nightly Emmy Lee DO   20 Units at 01/08/20 2139    [MAR Hold] tiZANidine (ZANAFLEX) tablet 4 mg  4 mg Oral Q6H PRN Kaila Wagner APRN - CNP   4 mg at 01/07/20 2123    [MAR Hold] morphine (PF) injection 2 mg  2 mg Intravenous Q4H PRN Gerline Idol, DPM   2 mg at 01/09/20 0320    [MAR Hold] HYDROcodone-acetaminophen (NORCO) 5-325 MG per tablet 2 tablet  2 tablet Oral Q3H PRN Gerline Idol, DPM   2 tablet at 01/09/20 0907    [MAR Hold] miconazole (MICOTIN) 2 % powder   Topical BID Onita Azulphilip Chong, DPM        [facility] Hold] sodium chloride flush 0.9 % injection 10 mL  10 mL Intravenous BID Onita Azul Saeran, DPM   10 mL at 01/08/20 0828    [MAR Hold] diphenhydrAMINE (BENADRYL) tablet 25 mg  25 mg Oral Q6H PRN Onita Azul Sautter, DPM   25 mg at 01/04/20 1754    [MAR Hold] carvedilol (COREG) tablet 12.5 mg  12.5 mg Oral BID WC Onita Azul Saeran, DPM   12.5 mg at 01/09/20 0908    [MAR Hold] sodium chloride flush 0.9 % injection 10 mL  10 mL Intravenous PRN Onita Azul Sautter, DPM        [facility] Hold] 0.9 % sodium chloride infusion   Intravenous Continuous Lauren Chong DPM 75 mL/hr at 01/09/20 0318      [MAR Hold] sodium chloride flush 0.9 % injection 10 mL  10 mL Intravenous PRN Laura Sena DPM 10 mL at 01/07/20 0223    [MAR Hold] potassium chloride (KLOR-CON M) extended release tablet 40 mEq  40 mEq Oral PRN Shelby Valerio DPM   40 mEq at 01/07/20 0855    Or    [MAR Hold] potassium bicarb-citric acid (EFFER-K) effervescent tablet 40 mEq  40 mEq Oral PRN Shelby Valerio DPM        Or   [facility] Hold] potassium chloride 10 mEq/100 mL IVPB (Peripheral Line)  10 mEq Intravenous PRN OBEY Mckeon Nestor Hold] magnesium sulfate 1 g in dextrose 5% 100 mL IVPB  1 g Intravenous PRN Shelby Valerio DPM        [MAR Hold] magnesium hydroxide (MILK OF MAGNESIA) 400 MG/5ML suspension 30 mL  30 mL Oral Daily PRN Jane Chong DPM        [MAR Hold] nicotine (NICODERM CQ) 21 MG/24HR 1 patch  1 patch Transdermal Daily PRN OBEY Mckeon [facility] Hold] aspirin chewable tablet 81 mg  81 mg Oral Daily Shelby Valerio DPM   Stopped at 01/09/20 0946    [MAR Hold] levothyroxine (SYNTHROID) tablet 50 mcg  50 mcg Oral Daily Shelby Valerio DPM   Stopped at 01/09/20 0947    [MAR Hold] simvastatin (ZOCOR) tablet 20 mg  20 mg Oral Nightly [place] Eric Chong DPM   20 mg at 01/08/20 2139    [MAR Hold] apixaban (ELIQUIS) tablet 2.5 mg  2.5 mg Oral BID Shelby Valerio DPM   Stopped at 01/09/20 0946    [MAR Hold] ondansetron (ZOFRAN) injection 4 mg  4 mg Intravenous Q6H PRN Shelby Valerio DPM   4 mg at 01/09/20 1247    [MAR Hold] acetaminophen (TYLENOL) tablet 650 mg  650 mg Oral Q4H PRN Shelby Valerio DPM   650 mg at 12/30/19 2306    [MAR Hold] glucose (GLUTOSE) 40 % oral gel 15 g  15 g Oral PRN Janekimani Chong DPM        [MAR Hold] dextrose 50 % IV solution  12.5 g Intravenous PRN Shelby Valerio DPM        [MAR Hold] glucagon (rDNA) injection 1 mg  1 mg Intramuscular PRN Jane Dollar Sautter, DPM        [facility] Hold] dextrose 5 % solution  100 mL/hr Intravenous PRN Jane Chong DPM        [MAR Hold] insulin lispro (HUMALOG) injection vial 0-18 Units  0-18 Units Subcutaneous TID BUFFY Valerio DPM   6 01/08/20 2000 01/09/20 0407 01/09/20 0633 01/09/20 0853   BP:  (!) 154/87  (!) 155/82   Pulse: 72 68     Resp:  18  18   Temp:  97.2 °F (36.2 °C)  97.8 °F (36.6 °C)   TempSrc:  Oral  Oral   SpO2:  97%  98%   Weight:   295 lb 12.8 oz (134.2 kg)    Height:                                                  BP Readings from Last 3 Encounters:   01/09/20 (!) 155/82   01/06/20 (!) 168/144   07/25/16 111/68       NPO Status: Time of last liquid consumption: 2330                        Time of last solid consumption: 2330                        Date of last liquid consumption: 01/08/20                        Date of last solid food consumption: 01/08/20    BMI:   Wt Readings from Last 3 Encounters:   01/09/20 295 lb 12.8 oz (134.2 kg)   07/24/16 240 lb (108.9 kg)   02/23/16 240 lb (108.9 kg)     Body mass index is 55.89 kg/m².     CBC:   Lab Results   Component Value Date    WBC 8.1 01/09/2020    RBC 3.18 01/09/2020    HGB 9.4 01/09/2020    HCT 30.2 01/09/2020    MCV 95.0 01/09/2020    RDW 13.5 01/09/2020     01/09/2020       CMP:   Lab Results   Component Value Date     01/09/2020    K 3.7 01/09/2020     01/09/2020    CO2 25 01/09/2020    BUN 17 01/09/2020    CREATININE 1.31 01/09/2020    GFRAA 53 01/09/2020    LABGLOM 44 01/09/2020    GLUCOSE 126 01/09/2020    PROT 8.2 12/29/2015    CALCIUM 8.2 01/09/2020    BILITOT 0.38 12/29/2015    ALKPHOS 89 12/29/2015    AST 29 12/29/2015    ALT 22 12/29/2015       POC Tests:   Recent Labs     01/09/20  1141   POCGLU 104       Coags:   Lab Results   Component Value Date    PROTIME 9.2 07/10/2012    INR 0.8 07/10/2012    APTT 23.1 07/10/2012       HCG (If Applicable):   Lab Results   Component Value Date    PREGTESTUR NEGATIVE 12/27/2013        ABGs: No results found for: PHART, PO2ART, KIP6ASI, ARE8YNK, BEART, P9SUGZGM     Type & Screen (If Applicable):  No results found for: JESSICA [facility]    Anesthesia Evaluation  Patient summary reviewed and Nursing notes reviewed no history of anesthetic complications:   Airway: Mallampati: II  TM distance: >3 FB   Neck ROM: full  Mouth opening: > = 3 FB Dental: normal exam         Pulmonary:Negative Pulmonary ROS and normal exam                               Cardiovascular:  Exercise tolerance: good (>4 METS),   (+) hypertension:, CAD: non-obstructive, CHF:, hyperlipidemia        Rhythm: regular  Rate: normal  Echocardiogram reviewed         Beta Blocker:  Dose within 24 Hrs         Neuro/Psych:   (+) neuromuscular disease:,             GI/Hepatic/Renal: Neg GI/Hepatic/Renal ROS            Endo/Other:    (+) DiabetesType I DM, poorly controlled, , hypothyroidism::., .                 Abdominal:           Vascular:                                    Anesthesia Plan      general     ASA 3     (LMA)  Induction: intravenous. MIPS: Postoperative opioids intended and Prophylactic antiemetics administered. Anesthetic plan and risks discussed with patient.       Plan discussed with CRNA and surgical team.                  Jb Renee MD   1/9/2020

## 2020-01-10 LAB
ANION GAP SERPL CALCULATED.3IONS-SCNC: 11 MMOL/L (ref 9–17)
BUN BLDV-MCNC: 17 MG/DL (ref 6–20)
BUN/CREAT BLD: ABNORMAL (ref 9–20)
CALCIUM SERPL-MCNC: 7.9 MG/DL (ref 8.6–10.4)
CHLORIDE BLD-SCNC: 106 MMOL/L (ref 98–107)
CO2: 21 MMOL/L (ref 20–31)
CREAT SERPL-MCNC: 1.2 MG/DL (ref 0.5–0.9)
GFR AFRICAN AMERICAN: 59 ML/MIN
GFR NON-AFRICAN AMERICAN: 49 ML/MIN
GFR SERPL CREATININE-BSD FRML MDRD: ABNORMAL ML/MIN/{1.73_M2}
GFR SERPL CREATININE-BSD FRML MDRD: ABNORMAL ML/MIN/{1.73_M2}
GLUCOSE BLD-MCNC: 202 MG/DL (ref 65–105)
GLUCOSE BLD-MCNC: 202 MG/DL (ref 65–105)
GLUCOSE BLD-MCNC: 212 MG/DL (ref 65–105)
GLUCOSE BLD-MCNC: 217 MG/DL (ref 65–105)
GLUCOSE BLD-MCNC: 224 MG/DL (ref 70–99)
HCT VFR BLD CALC: 31.1 % (ref 36.3–47.1)
HEMOGLOBIN: 9.4 G/DL (ref 11.9–15.1)
MCH RBC QN AUTO: 29.7 PG (ref 25.2–33.5)
MCHC RBC AUTO-ENTMCNC: 30.2 G/DL (ref 28.4–34.8)
MCV RBC AUTO: 98.1 FL (ref 82.6–102.9)
NRBC AUTOMATED: 0 PER 100 WBC
PDW BLD-RTO: 13.7 % (ref 11.8–14.4)
PLATELET # BLD: 370 K/UL (ref 138–453)
PMV BLD AUTO: 10.1 FL (ref 8.1–13.5)
POTASSIUM SERPL-SCNC: 4.2 MMOL/L (ref 3.7–5.3)
RBC # BLD: 3.17 M/UL (ref 3.95–5.11)
SODIUM BLD-SCNC: 138 MMOL/L (ref 135–144)
WBC # BLD: 8 K/UL (ref 3.5–11.3)

## 2020-01-10 PROCEDURE — 6370000000 HC RX 637 (ALT 250 FOR IP): Performed by: STUDENT IN AN ORGANIZED HEALTH CARE EDUCATION/TRAINING PROGRAM

## 2020-01-10 PROCEDURE — 85027 COMPLETE CBC AUTOMATED: CPT

## 2020-01-10 PROCEDURE — 2580000003 HC RX 258: Performed by: STUDENT IN AN ORGANIZED HEALTH CARE EDUCATION/TRAINING PROGRAM

## 2020-01-10 PROCEDURE — 99231 SBSQ HOSP IP/OBS SF/LOW 25: CPT | Performed by: INTERNAL MEDICINE

## 2020-01-10 PROCEDURE — 6370000000 HC RX 637 (ALT 250 FOR IP): Performed by: INTERNAL MEDICINE

## 2020-01-10 PROCEDURE — 80048 BASIC METABOLIC PNL TOTAL CA: CPT

## 2020-01-10 PROCEDURE — 1200000000 HC SEMI PRIVATE

## 2020-01-10 PROCEDURE — 6360000002 HC RX W HCPCS: Performed by: NURSE PRACTITIONER

## 2020-01-10 PROCEDURE — 36415 COLL VENOUS BLD VENIPUNCTURE: CPT

## 2020-01-10 PROCEDURE — 99232 SBSQ HOSP IP/OBS MODERATE 35: CPT | Performed by: INTERNAL MEDICINE

## 2020-01-10 PROCEDURE — 76937 US GUIDE VASCULAR ACCESS: CPT

## 2020-01-10 PROCEDURE — 82947 ASSAY GLUCOSE BLOOD QUANT: CPT

## 2020-01-10 RX ORDER — POLYETHYLENE GLYCOL 3350 17 G/17G
17 POWDER, FOR SOLUTION ORAL DAILY PRN
Status: DISCONTINUED | OUTPATIENT
Start: 2020-01-10 | End: 2020-01-12 | Stop reason: HOSPADM

## 2020-01-10 RX ORDER — DOCUSATE SODIUM 100 MG/1
100 CAPSULE, LIQUID FILLED ORAL 2 TIMES DAILY
Status: DISCONTINUED | OUTPATIENT
Start: 2020-01-10 | End: 2020-01-12 | Stop reason: HOSPADM

## 2020-01-10 RX ORDER — AMOXICILLIN AND CLAVULANATE POTASSIUM 500; 125 MG/1; MG/1
1 TABLET, FILM COATED ORAL EVERY 8 HOURS SCHEDULED
Status: DISCONTINUED | OUTPATIENT
Start: 2020-01-11 | End: 2020-01-12 | Stop reason: HOSPADM

## 2020-01-10 RX ORDER — LISINOPRIL 20 MG/1
40 TABLET ORAL DAILY
Status: DISCONTINUED | OUTPATIENT
Start: 2020-01-10 | End: 2020-01-12 | Stop reason: HOSPADM

## 2020-01-10 RX ORDER — FLUCONAZOLE 200 MG/1
200 TABLET ORAL DAILY
Status: DISCONTINUED | OUTPATIENT
Start: 2020-01-10 | End: 2020-01-12 | Stop reason: HOSPADM

## 2020-01-10 RX ORDER — INSULIN GLARGINE 100 [IU]/ML
40 INJECTION, SOLUTION SUBCUTANEOUS NIGHTLY
Status: DISCONTINUED | OUTPATIENT
Start: 2020-01-10 | End: 2020-01-11

## 2020-01-10 RX ADMIN — LEVOTHYROXINE SODIUM 50 MCG: 100 TABLET ORAL at 08:26

## 2020-01-10 RX ADMIN — HYDROMORPHONE HYDROCHLORIDE 1 MG: 1 INJECTION, SOLUTION INTRAMUSCULAR; INTRAVENOUS; SUBCUTANEOUS at 08:17

## 2020-01-10 RX ADMIN — HYDROCODONE BITARTRATE AND ACETAMINOPHEN 2 TABLET: 5; 325 TABLET ORAL at 07:11

## 2020-01-10 RX ADMIN — INSULIN LISPRO 3 UNITS: 100 INJECTION, SOLUTION INTRAVENOUS; SUBCUTANEOUS at 20:56

## 2020-01-10 RX ADMIN — HYDROCODONE BITARTRATE AND ACETAMINOPHEN 2 TABLET: 5; 325 TABLET ORAL at 10:32

## 2020-01-10 RX ADMIN — CARVEDILOL 12.5 MG: 12.5 TABLET, FILM COATED ORAL at 08:22

## 2020-01-10 RX ADMIN — SODIUM CHLORIDE: 9 INJECTION, SOLUTION INTRAVENOUS at 19:42

## 2020-01-10 RX ADMIN — INSULIN LISPRO 6 UNITS: 100 INJECTION, SOLUTION INTRAVENOUS; SUBCUTANEOUS at 08:18

## 2020-01-10 RX ADMIN — HYDROCODONE BITARTRATE AND ACETAMINOPHEN 2 TABLET: 5; 325 TABLET ORAL at 13:48

## 2020-01-10 RX ADMIN — HYDROCODONE BITARTRATE AND ACETAMINOPHEN 2 TABLET: 5; 325 TABLET ORAL at 03:54

## 2020-01-10 RX ADMIN — INSULIN GLARGINE 40 UNITS: 100 INJECTION, SOLUTION SUBCUTANEOUS at 20:55

## 2020-01-10 RX ADMIN — ANTI-FUNGAL POWDER MICONAZOLE NITRATE TALC FREE: 1.42 POWDER TOPICAL at 20:57

## 2020-01-10 RX ADMIN — ASPIRIN 81 MG 81 MG: 81 TABLET ORAL at 08:26

## 2020-01-10 RX ADMIN — HYDROMORPHONE HYDROCHLORIDE 1 MG: 1 INJECTION, SOLUTION INTRAMUSCULAR; INTRAVENOUS; SUBCUTANEOUS at 15:35

## 2020-01-10 RX ADMIN — HYDROMORPHONE HYDROCHLORIDE 1 MG: 1 INJECTION, SOLUTION INTRAMUSCULAR; INTRAVENOUS; SUBCUTANEOUS at 01:25

## 2020-01-10 RX ADMIN — APIXABAN 2.5 MG: 2.5 TABLET, FILM COATED ORAL at 20:55

## 2020-01-10 RX ADMIN — HYDROMORPHONE HYDROCHLORIDE 1 MG: 1 INJECTION, SOLUTION INTRAMUSCULAR; INTRAVENOUS; SUBCUTANEOUS at 21:22

## 2020-01-10 RX ADMIN — SIMVASTATIN 20 MG: 20 TABLET, FILM COATED ORAL at 20:55

## 2020-01-10 RX ADMIN — APIXABAN 2.5 MG: 2.5 TABLET, FILM COATED ORAL at 08:26

## 2020-01-10 RX ADMIN — HYDROMORPHONE HYDROCHLORIDE 1 MG: 1 INJECTION, SOLUTION INTRAMUSCULAR; INTRAVENOUS; SUBCUTANEOUS at 11:48

## 2020-01-10 RX ADMIN — HYDROCODONE BITARTRATE AND ACETAMINOPHEN 2 TABLET: 5; 325 TABLET ORAL at 23:46

## 2020-01-10 RX ADMIN — HYDROMORPHONE HYDROCHLORIDE 1 MG: 1 INJECTION, SOLUTION INTRAMUSCULAR; INTRAVENOUS; SUBCUTANEOUS at 04:58

## 2020-01-10 RX ADMIN — INSULIN LISPRO 6 UNITS: 100 INJECTION, SOLUTION INTRAVENOUS; SUBCUTANEOUS at 12:36

## 2020-01-10 RX ADMIN — DOCUSATE SODIUM 100 MG: 100 CAPSULE, LIQUID FILLED ORAL at 20:55

## 2020-01-10 RX ADMIN — SODIUM CHLORIDE: 9 INJECTION, SOLUTION INTRAVENOUS at 05:15

## 2020-01-10 RX ADMIN — INSULIN LISPRO 6 UNITS: 100 INJECTION, SOLUTION INTRAVENOUS; SUBCUTANEOUS at 17:25

## 2020-01-10 RX ADMIN — CARVEDILOL 12.5 MG: 12.5 TABLET, FILM COATED ORAL at 17:42

## 2020-01-10 RX ADMIN — HYDROCODONE BITARTRATE AND ACETAMINOPHEN 2 TABLET: 5; 325 TABLET ORAL at 20:08

## 2020-01-10 RX ADMIN — ANTI-FUNGAL POWDER MICONAZOLE NITRATE TALC FREE: 1.42 POWDER TOPICAL at 08:22

## 2020-01-10 RX ADMIN — SODIUM CHLORIDE, PRESERVATIVE FREE 10 ML: 5 INJECTION INTRAVENOUS at 08:27

## 2020-01-10 RX ADMIN — FLUCONAZOLE 200 MG: 200 TABLET ORAL at 17:40

## 2020-01-10 RX ADMIN — LISINOPRIL 40 MG: 20 TABLET ORAL at 17:40

## 2020-01-10 ASSESSMENT — PAIN SCALES - GENERAL
PAINLEVEL_OUTOF10: 9
PAINLEVEL_OUTOF10: 8
PAINLEVEL_OUTOF10: 8
PAINLEVEL_OUTOF10: 7
PAINLEVEL_OUTOF10: 10
PAINLEVEL_OUTOF10: 7
PAINLEVEL_OUTOF10: 7
PAINLEVEL_OUTOF10: 9
PAINLEVEL_OUTOF10: 7
PAINLEVEL_OUTOF10: 9
PAINLEVEL_OUTOF10: 9
PAINLEVEL_OUTOF10: 7

## 2020-01-10 NOTE — PROGRESS NOTES
Nutrition Assessment    Type and Reason for Visit: Reassess    Nutrition Recommendations:   -Continue diabetic diet   -Pt denied all nutritional supplements at this time   -Will continue to monitor po intake and wound healing     Nutrition Assessment:  Pt improving from a nutritional standpoint aeb pt reports that her appetite is improving and is consuming 1-50% of her meals. Pt s/p Rt foot amputation. Will continue to monitor po intake and wound healing progression. Malnutrition Assessment:  · Malnutrition Status: At risk for malnutrition  · Context: Acute illness or injury  · Findings of the 6 clinical characteristics of malnutrition (Minimum of 2 out of 6 clinical characteristics is required to make the diagnosis of moderate or severe Protein Calorie Malnutrition based on AND/ASPEN Guidelines):  1. Energy Intake-Less than or equal to 50% of estimated energy requirement, Greater than or equal to 7 days    2. Weight Loss-No significant weight loss,    3. Fat Loss-No significant subcutaneous fat loss,    4. Muscle Loss-No significant muscle mass loss,    5. Fluid Accumulation-Mild fluid accumulation, Extremities, Generalized  6.  Strength-Not measured    Nutrition Risk Level:  Moderate    Nutrient Needs:  · Estimated Daily Total Kcal: 1.2-1.3 ~>1666-6480 kcals/d   · Estimated Daily Protein (g): 1.2-1.5 gm/kg ~>58-72 gms/d     Nutrition Diagnosis:   · Problem: Inadequate oral intake  · Etiology: related to Insufficient energy/nutrient consumption(medical condition )     Signs and symptoms:  as evidenced by Diet history of poor intake, Patient report of, Intake 0-25%, Intake 25-50%    Objective Information:  · Wound Type: Multiple, Open Wounds  · Current Nutrition Therapies:  · Oral Diet Orders: Carb Control 4 Carbs/Meal   · Oral Diet intake: 26-50%, 1-25%  · Oral Nutrition Supplement (ONS) Orders: None  · Anthropometric Measures:  · Ht: 5' 1\" (154.9 cm)   · Current Body Wt: 299 lb (135.6 kg)  · Admission

## 2020-01-10 NOTE — PLAN OF CARE
Problem: Falls - Risk of:  Goal: Will remain free from falls  Description  Will remain free from falls  1/9/2020 2220 by Jarrett Corea RN  Outcome: Ongoing  1/9/2020 1903 by Friddie Holstein, RN  Outcome: Ongoing  Goal: Absence of physical injury  Description  Absence of physical injury  1/9/2020 2220 by Jarrett Corea RN  Outcome: Ongoing  1/9/2020 1903 by Friddie Holstein, RN  Outcome: Ongoing     Problem: Skin Integrity:  Goal: Demonstration of wound healing without infection will improve  Description  Demonstration of wound healing without infection will improve  1/9/2020 2220 by Jarrett Corea RN  Outcome: Ongoing  1/9/2020 1903 by Friddie Holstein, RN  Outcome: Ongoing  Goal: Complications related to intravenous access or infusion will be avoided or minimized  Description  Complications related to intravenous access or infusion will be avoided or minimized  1/9/2020 2220 by Jarrett Corea RN  Outcome: Ongoing  1/9/2020 1903 by Friddie Holstein, RN  Outcome: Ongoing  Goal: Will show no infection signs and symptoms  Description  Will show no infection signs and symptoms  1/9/2020 2220 by Jarrett Corea RN  Outcome: Ongoing  1/9/2020 1903 by Friddie Holstein, RN  Outcome: Ongoing  Goal: Absence of new skin breakdown  Description  Absence of new skin breakdown  1/9/2020 2220 by Jarrett Corea RN  Outcome: Ongoing  1/9/2020 1903 by Friddie Holstein, RN  Outcome: Ongoing     Problem: Pain:  Goal: Pain level will decrease  Description  Pain level will decrease  1/9/2020 2220 by Jarrett Corea RN  Outcome: Ongoing  1/9/2020 1903 by Friddie Holstein, RN  Outcome: Ongoing  Goal: Control of acute pain  Description  Control of acute pain  1/9/2020 2220 by Jarrett Corea RN  Outcome: Ongoing  1/9/2020 1903 by Friddie Holstein, RN  Outcome: Ongoing  Goal: Control of chronic pain  Description  Control of chronic pain  1/9/2020 2220 by Jarrett Corea RN  Outcome: Ongoing  1/9/2020 1903 by Aaron Montoya CARLO Johnson  Outcome: Ongoing     Problem: Discharge Planning:  Goal: Discharged to appropriate level of care  Description  Discharged to appropriate level of care  Outcome: Ongoing     Problem: Serum Glucose Level - Abnormal:  Goal: Ability to maintain appropriate glucose levels will improve  Description  Ability to maintain appropriate glucose levels will improve  Outcome: Ongoing     Problem: Sensory Perception - Impaired:  Goal: Ability to maintain a stable neurologic state will improve  Description  Ability to maintain a stable neurologic state will improve  Outcome: Ongoing     Problem:  Body Temperature - Imbalanced:  Goal: Ability to maintain a body temperature in the normal range will improve  Description  Ability to maintain a body temperature in the normal range will improve  Outcome: Ongoing     Problem: Mobility - Impaired:  Goal: Mobility will improve to maximum level  Description  Mobility will improve to maximum level  1/9/2020 2220 by Patricia Carlos RN  Outcome: Ongoing  1/9/2020 1903 by John Riley RN  Outcome: Ongoing     Problem: Pain:  Goal: Control of acute pain  Description  Control of acute pain  1/9/2020 2220 by Patricia Carlos RN  Outcome: Ongoing  1/9/2020 1903 by John Riley RN  Outcome: Ongoing  Goal: Control of chronic pain  Description  Control of chronic pain  1/9/2020 2220 by Patricia Carlos RN  Outcome: Ongoing  1/9/2020 1903 by John Riley RN  Outcome: Ongoing     Problem: Skin Integrity - Impaired:  Goal: Will show no infection signs and symptoms  Description  Will show no infection signs and symptoms  1/9/2020 2220 by Patricia Carlos RN  Outcome: Ongoing  1/9/2020 1903 by John Riley RN  Outcome: Ongoing  Goal: Absence of new skin breakdown  Description  Absence of new skin breakdown  1/9/2020 2220 by Patricia Carlos RN  Outcome: Ongoing  1/9/2020 1903 by John Riley RN  Outcome: Ongoing     Problem: Nutrition  Goal: Optimal nutrition therapy  Outcome: Ongoing

## 2020-01-10 NOTE — PROGRESS NOTES
also has a history of CAD with multiple stents in place, PE and IDDM. She reports that she lost her insurance 6 months ago and has not been taking her Brilinta, Eliquis or Insulin as a result. She denies any chills, fevers, night sweats or malaise prior to presenting to the ED. In the ED she was found to have abscess at the base of the Rt great toe with erythema and fluctuance noted. The wound did not penetrate to the bone, no induration or crepitus noted. , .5    Pt was admitted, started on Zosyn and Vancomycin, and seen by podiatry, and I&D was performed with serosanguinous drainage noted. MRI's of the foot showed:  No osteomyelitis or other acute osseous abnormality of the hindfoot. 2. Circumferential subcutaneous edema about the ankle and along the dorsum of   the midfoot compatible with bland edema versus cellulitis.  No deep soft   tissue ulceration or drainable fluid collection. 1. No evidence of osteomyelitis in the right forefoot. 2. Focal confluent soft tissue edema at the plantar aspect of the proximal   great toe, possibly focal cellulitis or phlegmon.  No definite organized   fluid collection to indicate abscess at this time.  This area measures   approximately 1.9 x 0.8 x 2.1 cm.   3. A couple low signal foci are seen within the plantar soft tissues of the   proximal great toe, favored to be vascular structures but possibility of soft   tissue gas is not entirely excluded. 4. Small joint effusion at the 1st MTP joint. Wound cultures are showing Grp B strep and Candida. 1-6 pt to OR for Rt hallux amputation and I&D    1-8 Wound is clean, plan for TMA on 1-10-20  Staph isolate from wound is methicillin sensitive. Stop Ceftaroline and resume Ceftriaxone - stop date remains 1-15-20    CURRENT EXAMINATION: 1/10/2020    Afebrile  VS stable    The patient seen and evaluated at bedside. She is doing well.  Incision of TMA clean    Patient does not have any turgor. With signs of peripheral arterial or venous insufficiency. Rt foot dressing clean and dry    Medical Decision Making -Laboratory:   I have independently reviewed/ordered the following labs:    CBC with Differential:   Recent Labs     01/08/20 0433 01/09/20  0513   WBC 8.9 8.1   HGB 9.4* 9.4*   HCT 29.9* 30.2*    374   LYMPHOPCT 19* 24   MONOPCT 7 7     BMP:   Recent Labs     01/08/20 0433 01/09/20  0513   * 142   K 3.8 3.7    107   CO2 23 25   BUN 22* 17   CREATININE 1.36* 1.31*     Hepatic Function Panel:   Recent Labs     01/08/20 0433 01/09/20  0513   LABALBU 1.3* 1.6*     No results for input(s): RPR in the last 72 hours. No results for input(s): HIV in the last 72 hours. No results for input(s): BC in the last 72 hours.   Lab Results   Component Value Date    MUCUS NOT REPORTED 01/07/2020    RBC 3.18 01/09/2020    TRICHOMONAS NOT REPORTED 01/07/2020    WBC 8.1 01/09/2020    YEAST NOT REPORTED 01/07/2020    TURBIDITY TURBID 01/07/2020     Lab Results   Component Value Date    CREATININE 1.31 01/09/2020    GLUCOSE 126 01/09/2020       Medical Decision Making-Imaging:     EXAMINATION:   MRI OF THE RIGHT HINDFOOT WITHOUT AND WITH CONTRAST, 12/31/2019 4:22 pm       TECHNIQUE:   Multiplanar multisequence MRI of the right foot was performed without and   with the administration of intravenous contrast.       COMPARISON:   Right foot radiographs 12/30/2019.       HISTORY:   ORDERING SYSTEM PROVIDED HISTORY: right foot wound   TECHNOLOGIST PROVIDED HISTORY:   right foot wound   What is the area of interest?->Forefoot   Is the patient pregnant?->No       The patient reported to the MRI tech that she had complaints involving the   heel and midfoot so a hindfoot study was performed.  The clinician has   requested that the study be repeated as a forefoot.       FINDINGS:   PLANTAR FASCIA: Mild chronic thickening of the central cord of the plantar   fascia.  No acute plantar fasciitis or plantar fascia tear.  Severe fatty   atrophy of the abductor digiti minimi muscle.       ACHILLES TENDON: The Achilles tendon is normal in position, morphology and   signal.  No associated bursitis.       BONE MARROW: No fracture or dislocation.  No abnormal marrow space-occupying   lesion.  No osseous erosion.       JOINT SPACES: The tibiotalar and subtalar joints are normal in appearance. The talonavicular and calcaneocuboid joints are unremarkable.  The midfoot is   unremarkable.  No joint effusion.       SYNDESMOTIC LIGAMENTS: The anterior and posterior inferior tibiofibular   ligaments are intact.       LATERAL COLLATERAL LIGAMENT COMPLEX: The anterior talofibular ligament,   calcaneofibular ligament and posterior talofibular ligaments are without   acute abnormality.       DELTOID LIGAMENT COMPLEX:  The superficial and deep components of the deltoid   ligament complex are normal.       SINUS TARSI AND SPRING LIGAMENT: The sinus tarsi fat plug is grossly   preserved.  The spring ligament complex is intact.       MEDIAL TENDONS: The posterior tibialis, flexor digitorum longus and flexor   hallucis longus tendons are intact.       LATERAL TENDONS:  The peroneus longus and brevis tendons are intact.       ANTERIOR TENDONS: The tibialis anterior, extensor hallucis longus and   extensor digitorum longus tendons are normal in position, morphology and   signal.       TARSAL TUNNEL: There are no obstructing lesions within the tarsal tunnel.       MISCELLANEOUS: Circumferential subcutaneous edema about the ankle and along   the dorsum of the midfoot.  No soft tissue ulceration, sinus tract, or   drainable fluid collection.  No abnormal soft tissue mass.           Impression   1. No osteomyelitis or other acute osseous abnormality of the hindfoot.    2. Circumferential subcutaneous edema about the ankle and along the dorsum of   the midfoot compatible with bland edema versus cellulitis.  No deep soft   tissue ulceration films  · Moderate Large amounts of data were reviewed  · Discussed with nursing Staff, Discharge planner  · Infection Control and Prevention measures reviewed  · All prior entries were reviewed  · Administer medications as ordered  · Prognosis: Fair  · Discharge planning reviewed  · Follow up as outpatient. Thank you for allowing us to participate in the care of this patient. Please call with questions.     Daniela Escobar MD  Pager: [phone] - Office: [phone]

## 2020-01-10 NOTE — PROGRESS NOTES
[facility] 18    Progress Note    1/10/2020    1:33 PM    Name:   Christian Gonzales  MRN:     [id number]     Acct:      [de-identified]   Room:   2026/2026-01  IP Day:  11  Admit Date:  12/30/2019  2:09 PM    PCP:   KAYLEE Andres CNP  Code Status:  Full Code    Subjective:     C/C:   Chief Complaint   Patient presents with    Foot Pain     complaining of right foot pain to bottom of foot near crease of great toe and lateral right foot, she states that she started to notice swelling to dorsal aspect of foot with mild redness and pain    Leg Pain     complaining of right lower extremity pain to right medial aspect      Interval History Status: not changed. Pt seen and examined this morning. S/p TMA yesterday with podiatry. Patient is in good spirits still. Vitals stable. ACE wrap on right foot. Long discussion with the patient regarding her insurance. Her insurance is allegedly only good in [place]. She is planning on leaving the area to return to her 6year old son in [place] as soon as possible. Brief History:     Per my associate:    Patient is a 38 yr old female who presents to ED with c/p right foot pain.  Right great toe noted to be deep red/pruple in color with swelling extending into dorsum of foot along with red/purple coloring.  Pulses are palpable, brisk cap refill, and warmth noted.  Ms. Christie Mcgrath denies any injury to the affected toe/foot, denies any fevers, chills, N/V/D andstates the symptoms have been ongoing for approx 24 hrs.  She has baseline neuropathy and denies any changes in N/T.  Presenting labs with elevated inflammatory markers along with significant Hyperglycemia, no gas on radiological imaging and doppler negative for DVT. Underwent I&D with subsequent amputation of the right great hallux on 1/6/2019. Currently being treated with IV antibiotics.   To OR today for further treatment (amputation vs wound vac placement). Review of Systems:     Constitutional:  negative for chills, fevers, sweats  Respiratory:  negative for cough, dyspnea on exertion, hemoptysis, shortness of breath, wheezing  Cardiovascular:  negative for chest pain, chest pressure/discomfort, lower extremity edema, palpitations  Gastrointestinal: positive for constipation; no further episodes; negative for abdominal pain, , diarrhea  Extremities: reports right foot pain  Neurological:  negative for dizziness, headache    Medications: Allergies:  No Known Allergies    Current Meds:   Scheduled Meds:    insulin glargine  40 Units Subcutaneous Nightly    cefTRIAXone (ROCEPHIN) IV  2 g Intravenous Q24H    miconazole   Topical BID    sodium chloride flush  10 mL Intravenous BID    carvedilol  12.5 mg Oral BID WC    aspirin  81 mg Oral Daily    levothyroxine  50 mcg Oral Daily    simvastatin  20 mg Oral Nightly    apixaban  2.5 mg Oral BID    insulin lispro  0-18 Units Subcutaneous TID WC    insulin lispro  0-9 Units Subcutaneous Nightly     Continuous Infusions:    sodium chloride 75 mL/hr at 01/10/20 0515    dextrose       PRN Meds: HYDROmorphone **OR** HYDROmorphone, tiZANidine, HYDROcodone 5 mg - acetaminophen, diphenhydrAMINE, sodium chloride flush, sodium chloride flush, potassium chloride **OR** potassium alternative oral replacement **OR** potassium chloride, magnesium sulfate, magnesium hydroxide, nicotine, ondansetron, acetaminophen, glucose, dextrose, glucagon (rDNA), dextrose    Data:     Past Medical History:   has a past medical history of Hyperlipidemia, Hypertension, Hypothyroidism, Type II or unspecified type diabetes mellitus without mention of complication, not stated as uncontrolled, and Urinary incontinence. Social History:   reports that she has quit smoking. She smoked 0.10 packs per day. She does not have any smokeless tobacco history on file. She reports that she does not drink alcohol or use drugs.      Family History:   Family History   Problem Relation Age of Onset    Stroke Father     Heart Disease Other        Vitals:  /67   Pulse 74   Temp 97.6 °F (36.4 °C) (Oral)   Resp 18   Ht 5' 1\" (1.549 m)   Wt 299 lb 1.6 oz (135.7 kg)   SpO2 100%   BMI 56.51 kg/m²   Temp (24hrs), Av.5 °F (35.8 °C), Min:94.6 °F (34.8 °C), Max:98.2 °F (36.8 °C)    Recent Labs     20  1749 01/09/20  2008 01/10/20  0753 01/10/20  1220   POCGLU 97 201* 202* 217*       I/O (24Hr): Intake/Output Summary (Last 24 hours) at 1/10/2020 1333  Last data filed at 1/10/2020 0703  Gross per 24 hour   Intake 3443 ml   Output 1300 ml   Net 2143 ml       Labs:  Hematology:  Recent Labs     20  0513   WBC 8.9 8.1   RBC 3.13* 3.18*   HGB 9.4* 9.4*   HCT 29.9* 30.2*   MCV 95.5 95.0   MCH 30.0 29.6   MCHC 31.4 31.1   RDW 13.6 13.5    374   MPV 11.1 10.6   .2* 142.6*     Chemistry:  Recent Labs     20  0513   * 142   K 3.8 3.7    107   CO2 23 25   GLUCOSE 231* 126*   BUN 22* 17   CREATININE 1.36* 1.31*   ANIONGAP 9 10   LABGLOM 42* 44*   GFRAA 51* 53*   CALCIUM 7.9* 8.2*   PHOS 3.7 3.9     Recent Labs     20  0433  01/09/20  0513  20  1141 20  1623 20  1749 01/09/20  2008 01/10/20  0753 01/10/20  1220   LABALBU 1.3*  --  1.6*  --   --   --   --   --   --   --    POCGLU  --    < >  --    < > 104 105 97 201* 202* 217*    < > = values in this interval not displayed. ABG:  Lab Results   Component Value Date    FIO2 INFORMATION NOT PROVIDED 2015     Lab Results   Component Value Date/Time    SPECIAL NOT REPORTED 2019 12:22 PM    SPECIAL NOT REPORTED 2019 12:22 PM     Lab Results   Component Value Date/Time    CULTURE DUPLICATE ORDER  12:22 PM       Radiology:  Tamera Da Silvaole Foot Right (min 3 Views)    Result Date: 2020  1.   Soft tissue swelling and gas in the great toe, extending to the level of the 1st metatarsal head, concerning foot infection ([facility] 1/8/2020 Yes    Other proteinuria 1/8/2020 Yes    Hypoalbuminemia 1/8/2020 Yes    Obesity, morbid, BMI 50 or higher ([facility] 1/8/2020 Yes    Hypothyroidism 1/8/2020 Yes    CAROLYN (acute kidney injury) ([facility] 1/8/2020 Yes    Cellulitis of foot 1/8/2020 Yes    Septic arthritis of right foot ([facility] 1/8/2020 Yes    Equinus contracture of right ankle 1/9/2020 Yes          Plan:        1. Appreciate podiatry and infectious disease recommendations  2. S/p I&D with R hallux amputation on 1/6  3. S/p TMA with ZAIRE, RLE on 1/9  4. Per podiatry: Strict NWB to R foot in surgical shoe w/ walker; planning for SAMARA drain removal tomorrow  5. Per ID DC IV Abx - start augmentin 500 TID through 1-25; diflucan 200 daily for same duration  6. Monitor renal function - patient with a significant amount of proteinuria and a very low albumin. This is likely secondary to diabetic nephropathy, as she has been without her medications for some time. She will need to make sure she follows up with a nephrologist in the outpatient setting. She will also need to remain on Eliquis, as she is in a hypercoaguable state with her nephrotic range proteinuria. 7. Check labs in AM  8. Discussed with case management today - we need to know if patient will have insurance at discharge. She will need multiple medications, including antiplatelet therapy, anticoagulation, anti-diabetic medications, antibiotics  9. Continue to monitor fluid status in the setting in chronic diastolic heart failure - appears compensated at this moment in time. 10. Hold parameters placed on coreg; resume lisinopril today  11. ISS and Lantus - Will restart 40 units tonight. 12. Eliquis for DVT ppx (hx of DVT) - resume at discretion of podiatry  13. Discharge planning - discharge in AM if labs and vitals are stable.     33 Ceci Gonzales DO  1/10/2020  1:33 PM

## 2020-01-10 NOTE — PLAN OF CARE
Problem: Falls - Risk of:  Goal: Will remain free from falls  Description  Will remain free from falls  Outcome: Ongoing  Goal: Absence of physical injury  Description  Absence of physical injury  Outcome: Ongoing     Problem: Skin Integrity:  Goal: Demonstration of wound healing without infection will improve  Description  Demonstration of wound healing without infection will improve  Outcome: Ongoing  Goal: Complications related to intravenous access or infusion will be avoided or minimized  Description  Complications related to intravenous access or infusion will be avoided or minimized  Outcome: Ongoing  Goal: Will show no infection signs and symptoms  Description  Will show no infection signs and symptoms  Outcome: Ongoing  Goal: Absence of new skin breakdown  Description  Absence of new skin breakdown  Outcome: Ongoing     Problem: Pain:  Goal: Pain level will decrease  Description  Pain level will decrease  Outcome: Ongoing  Goal: Control of acute pain  Description  Control of acute pain  Outcome: Ongoing  Goal: Control of chronic pain  Description  Control of chronic pain  Outcome: Ongoing     Problem: Mobility - Impaired:  Goal: Mobility will improve to maximum level  Description  Mobility will improve to maximum level  Outcome: Ongoing     Problem: Pain:  Goal: Control of acute pain  Description  Control of acute pain  Outcome: Ongoing  Goal: Control of chronic pain  Description  Control of chronic pain  Outcome: Ongoing     Problem: Skin Integrity - Impaired:  Goal: Will show no infection signs and symptoms  Description  Will show no infection signs and symptoms  Outcome: Ongoing  Goal: Absence of new skin breakdown  Description  Absence of new skin breakdown  Outcome: Ongoing

## 2020-01-10 NOTE — PROGRESS NOTES
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 Stop date 1-15-20  · 1-8 Stop Ceftaroline 600 mg IV  q 12 hr Staph isolate is methicillin sensitive  · Continue elevation of the leg and foot with 3-4 pillows. · Kerlex and ACE wraps to the foot and ankle. · Diflucan 200 mg po daily discontinued  · Gas gangrene in the right foot s/p Transmetatarsal amputation, right foot and Achilles tendon lengthening, right foot on 1/9/20    Medical Decision Making/Summary/Discussion:1/10/2020     ·   Infection Control Recommendations   · [place] Precautions    Antimicrobial Stewardship Recommendations     · Targeted therapy  · Restricted medication    Coordination of Outpatient Care:   · Estimated Length of IV antimicrobials: 1-15-20  · Patient will need Midline Catheter Insertion:  No  · Patient will need PICC line Insertion: No  · Patient will need: Home IV , [place],  SNF,  LTAC: No  · Patient will need outpatient wound care: TBD    Chief complaint/reason for consultation:   · Diabetic foot infection, uncontrolled DM      History of Present Illness:   Shantell Angel is a 38y.o.-year-old  female who was initially admitted on 12/30/2019. Patient seen at the request of Dr. Steve Rojas:    Pt is a 37 yo woman visiting from [place]. She noted some Rt great toe pain on 12-24. By 12-30 the pain had progressed and her foot was swollen and tender so she presented to the ED for further evaluation. Pt also has a history of CAD with multiple stents in place, PE and IDDM.  She was taken to the OR on  for Rt Hallux amputation and I&D  Plan for Transmetatarsal amputation on  per Pt and Podiatry. Staph isolate from wound is methicillin sensitive. Stop Ceftaroline and resume Ceftriaxone - stop date remains 1-15-20    Wound dressing clean. Patient does not have any fevers, chills, cough, shortness of breath, chest pains or palpitations    Initial Culture 20 with Strep Grp B, S aureus, Prevotella, Candida. Difficult to interpret as it was a surface culture. Renal function showing fluctuations    Labs, X rays reviewed: 1/10/2020      BUN: 19-->22->23->22  Cr: 1.2-->1.41->1.3->1.3    WBC: 8.9-->11.5->10->8.9  Hb: 11.0-->9.5->9.1->9.4  Plat:  235-->277->310->364    CRP 89->151->212->262->257->331.7->251  1-4 ESR >130  Cultures:  Urine:  ·   Blood:  · 12-31 x 2 no growth to date  Sputum :  ·   Wound:  ·  Group B strep, Candida albicans, S aureus, Prevotella    Rt foot 1-      Rt foot 20            Rt foot/Wound:20                      Discussed with patient, RN    I have personally reviewed the past medical history, past surgical history, medications, social history, and family history, and I have updated the database accordingly.   Past Medical History:     Past Medical History:   Diagnosis Date    Hyperlipidemia     Hypertension     Hypothyroidism     Type II or unspecified type diabetes mellitus without mention of complication, not stated as uncontrolled     Urinary incontinence        Past Surgical  History:     Past Surgical History:   Procedure Laterality Date    ACHILLES TENDON SURGERY Right 2020    ACHILLES TENDON LENGTHENING     SECTION      2 times    CHOLECYSTECTOMY      FOOT AMPUTATION THROUGH METATARSAL Right 2020    TRANSMETATARSAL AMPUTATION FOOT    FOOT DEBRIDEMENT Right 2020    INCISION AND DRAINAGE RIGHT FOOT, HALLUX AMPUTATION performed by Aguilar Pierre DPM at [place] [id number] Right 2020 on file       Family History:     Family History   Problem Relation Age of Onset    Stroke Father     Heart Disease Other         Allergies:   Patient has no known allergies. Review of Systems:   Constitutional: No fevers or chills. No systemic complaints. Feeling better today  Head: No headaches  Eyes: No double vision or blurry vision. No conjunctival inflammation. ENT: No sore throat or runny nose. . No hearing loss, tinnitus or vertigo. Cardiovascular: No chest pain or palpitations. No shortness of breath. No TORRES  Lung: No shortness of breath or cough. No sputum production  Abdomen: Mild nausea, no vomiting, diarrhea, or abdominal pain. Roseann Silk No cramps. Genitourinary: No increased urinary frequency, or dysuria. No hematuria. No suprapubic or CVA pain  Musculoskeletal: No muscle aches or pains. No joint effusions, swelling or deformities. Pain controlled  Hematologic: No bleeding or bruising. Neurologic: No headache, weakness, numbness, or tingling. Integument: No rash, no ulcers. Psychiatric: No depression. Endocrine: No polyuria, no polydipsia, no polyphagia. Physical Examination :     Patient Vitals for the past 8 hrs:   BP Temp Temp src Pulse Resp SpO2 Weight   01/10/20 0751 136/67 97.6 °F (36.4 °C) Oral 74 18 100 % --   01/10/20 0608 -- -- -- -- -- -- 299 lb 1.6 oz (135.7 kg)     General Appearance: Awake, alert, and in no apparent distress  Head:  Normocephalic, no trauma  Eyes: Pupils equal, round, reactive to light and accommodation; extraocular movements intact; sclera anicteric; conjunctivae pink. No embolic phenomena. ENT: Oropharynx clear, without erythema, exudate, or thrush. No tenderness of sinuses. Mouth/throat: mucosa pink and moist. No lesions. Dentition in good repair. Neck:Supple, without lymphadenopathy. Thyroid normal, No bruits. Pulmonary/Chest: Clear to auscultation, without wheezes, rales, or rhonchi. No dullness to percussion.    Cardiovascular: Regular rate and rhythm without murmurs, rubs, or gallops. Distant heart tones  Abdomen: Soft, non tender. Bowel sounds normal. No organomegaly  All four Extremities: No cyanosis, clubbing, edema, or effusions. Right foot amputation, stump healthy and closed. Neurologic: No gross sensory or motor deficits. Skin: Warm and dry with good turgor. With signs of peripheral arterial or venous insufficiency. Rt foot dressing clean and dry    Medical Decision Making -Laboratory:   I have independently reviewed/ordered the following labs:    CBC with Differential:   Recent Labs     01/08/20 0433 01/09/20  0513   WBC 8.9 8.1   HGB 9.4* 9.4*   HCT 29.9* 30.2*    374   LYMPHOPCT 19* 24   MONOPCT 7 7     BMP:   Recent Labs     01/08/20 0433 01/09/20  0513   * 142   K 3.8 3.7    107   CO2 23 25   BUN 22* 17   CREATININE 1.36* 1.31*     Hepatic Function Panel:   Recent Labs     01/08/20 0433 01/09/20  0513   LABALBU 1.3* 1.6*     No results for input(s): RPR in the last 72 hours. No results for input(s): HIV in the last 72 hours. No results for input(s): BC in the last 72 hours.   Lab Results   Component Value Date    MUCUS NOT REPORTED 01/07/2020    RBC 3.18 01/09/2020    TRICHOMONAS NOT REPORTED 01/07/2020    WBC 8.1 01/09/2020    YEAST NOT REPORTED 01/07/2020    TURBIDITY TURBID 01/07/2020     Lab Results   Component Value Date    CREATININE 1.31 01/09/2020    GLUCOSE 126 01/09/2020       Medical Decision Making-Imaging:     EXAMINATION:   MRI OF THE RIGHT HINDFOOT WITHOUT AND WITH CONTRAST, 12/31/2019 4:22 pm       TECHNIQUE:   Multiplanar multisequence MRI of the right foot was performed without and   with the administration of intravenous contrast.       COMPARISON:   Right foot radiographs 12/30/2019.       HISTORY:   ORDERING SYSTEM PROVIDED HISTORY: right foot wound   TECHNOLOGIST PROVIDED HISTORY:   right foot wound   What is the area of interest?->Forefoot   Is the patient pregnant?->No       The patient reported to the MRI tech that she had complaints involving the   heel and midfoot so a hindfoot study was performed.  The clinician has   requested that the study be repeated as a forefoot.       FINDINGS:   PLANTAR FASCIA: Mild chronic thickening of the central cord of the plantar   fascia.  No acute plantar fasciitis or plantar fascia tear.  Severe fatty   atrophy of the abductor digiti minimi muscle.       ACHILLES TENDON: The Achilles tendon is normal in position, morphology and   signal.  No associated bursitis.       BONE MARROW: No fracture or dislocation.  No abnormal marrow space-occupying   lesion.  No osseous erosion.       JOINT SPACES: The tibiotalar and subtalar joints are normal in appearance.    The talonavicular and calcaneocuboid joints are unremarkable.  The midfoot is   unremarkable.  No joint effusion.       SYNDESMOTIC LIGAMENTS: The anterior and posterior inferior tibiofibular   ligaments are intact.       LATERAL COLLATERAL LIGAMENT COMPLEX: The anterior talofibular ligament,   calcaneofibular ligament and posterior talofibular ligaments are without   acute abnormality.       DELTOID LIGAMENT COMPLEX:  The superficial and deep components of the deltoid   ligament complex are normal.       SINUS TARSI AND SPRING LIGAMENT: The sinus tarsi fat plug is grossly   preserved.  The spring ligament complex is intact.       MEDIAL TENDONS: The posterior tibialis, flexor digitorum longus and flexor   hallucis longus tendons are intact.       LATERAL TENDONS:  The peroneus longus and brevis tendons are intact.       ANTERIOR TENDONS: The tibialis anterior, extensor hallucis longus and   extensor digitorum longus tendons are normal in position, morphology and   signal.       TARSAL TUNNEL: There are no obstructing lesions within the tarsal tunnel.       MISCELLANEOUS: Circumferential subcutaneous edema about the ankle and along   the dorsum of the midfoot.  No soft tissue ulceration, sinus tract, or   drainable fluid have not been performed. Culture Abnormal  12/31/2019 12:[address] (BACTEROIDES BIVIUS) BIVIA LIGHT GROWTH BETA LACTAMASE POSITIVE          Medical Decision Making-Other:     Note:  · Labs, medications, radiologic studies were reviewed with personal review of films  · Moderate Large amounts of data were reviewed  · Discussed with nursing Staff, Discharge planner  · Infection Control and Prevention measures reviewed  · All prior entries were reviewed  · Administer medications as ordered  · Prognosis: Fair  · Discharge planning reviewed  · Follow up as outpatient. Thank you for allowing us to participate in the care of this patient. Please call with questions. Charlene Luong MD     ATTESTATION:    I have discussed the case, including pertinent history and exam findings with the residents and students. I have seen and examined the patient and the key elements of the encounter have been performed by me. I was present when the student obtained his information or examined the patient. I have reviewed the laboratory data, other diagnostic studies and discussed them with the residents. I have updated the medical record where necessary. I agree with the assessment, plan and orders as documented by the resident/ student.     Porsha Anand MD.    Pager: [phone] - Office: [phone]

## 2020-01-10 NOTE — DISCHARGE INSTR - COC
Swelling;Red;Painful;Fragile;Edema;Clean 2020  4:00 AM   Number of days: 5        Elimination:  Continence:   · Bowel: {YES / ZC:46844}  · Bladder: {YES / CP:18366}  Urinary Catheter: {Urinary Catheter:867518548}   Colostomy/Ileostomy/Ileal Conduit: {YES / QB:31693}       Date of Last BM: ***    Intake/Output Summary (Last 24 hours) at 1/10/2020 1208  Last data filed at 1/10/2020 0609  Gross per 24 hour   Intake 3443 ml   Output 1300 ml   Net 2143 ml     I/O last 3 completed shifts:   In: 0849 [P.O.:1080; I.V.:2363]  Out: 1300 [Urine:1150; Blood:150]    Safety Concerns:     [employer] Safety Concerns:795695416}    Impairments/Disabilities:      [employer] Impairments/Disabilities:856460530}    Nutrition Therapy:  Current Nutrition Therapy:   [employer] Diet List:407946566}    Routes of Feeding: {P DME Other Feedings:066220070}  Liquids: {Slp liquid thickness:70703}  Daily Fluid Restriction: {CHP DME Yes amt example:155281720}  Last Modified Barium Swallow with Video (Video Swallowing Test): {Done Not Done WJLY:220300530}    Treatments at the Time of Hospital Discharge:   Respiratory Treatments: ***  Oxygen Therapy:  {Therapy; copd oxygen:11736}  Ventilator:    { CC Vent IENI:306172358}    Rehab Therapies: {THERAPEUTIC INTERVENTION:2365256384}  Weight Bearing Status/Restrictions: [address] [employer]  Weight Bearin}  Other Medical Equipment (for information only, NOT a DME order):  {EQUIPMENT:985783744}  Other Treatments: ***    Patient's personal belongings (please select all that are sent with patient):  {[facility] DME Belongings:798984550}    RN SIGNATURE:  {Esignature:020188233}    CASE MANAGEMENT/SOCIAL WORK SECTION    Inpatient Status Date: ***    Readmission Risk Assessment Score:  Readmission Risk              Risk of Unplanned Readmission:        13           Discharging to Facility/ Agency   · Name:   · Address:  · Phone:  · Fax:    Dialysis Facility (if applicable)   · Name:  · Address:  · Dialysis

## 2020-01-10 NOTE — CARE COORDINATION
Transitional planning-talked with patient. Plan is to go home and go to [place] when discharged. Needs PO antibiotics-can't get to infusion center-has out of state insurance.

## 2020-01-11 LAB
ABSOLUTE EOS #: 0.33 K/UL (ref 0–0.44)
ABSOLUTE IMMATURE GRANULOCYTE: 0.04 K/UL (ref 0–0.3)
ABSOLUTE LYMPH #: 1.54 K/UL (ref 1.1–3.7)
ABSOLUTE MONO #: 0.68 K/UL (ref 0.1–1.2)
ALBUMIN SERPL-MCNC: 1.1 G/DL (ref 3.5–5.2)
ALBUMIN SERPL-MCNC: 1.3 G/DL (ref 3.5–5.2)
ALBUMIN/GLOBULIN RATIO: 0.3 (ref 1–2.5)
ALP BLD-CCNC: 177 U/L (ref 35–104)
ALT SERPL-CCNC: 12 U/L (ref 5–33)
ANION GAP SERPL CALCULATED.3IONS-SCNC: 9 MMOL/L (ref 9–17)
AST SERPL-CCNC: 20 U/L
BASOPHILS # BLD: 0 % (ref 0–2)
BASOPHILS ABSOLUTE: <0.03 K/UL (ref 0–0.2)
BILIRUB SERPL-MCNC: <0.1 MG/DL (ref 0.3–1.2)
BILIRUBIN DIRECT: <0.08 MG/DL
BILIRUBIN, INDIRECT: ABNORMAL MG/DL (ref 0–1)
BUN BLDV-MCNC: 15 MG/DL (ref 6–20)
BUN/CREAT BLD: ABNORMAL (ref 9–20)
C-REACTIVE PROTEIN: 145.1 MG/L (ref 0–5)
CALCIUM SERPL-MCNC: 7.8 MG/DL (ref 8.6–10.4)
CHLORIDE BLD-SCNC: 107 MMOL/L (ref 98–107)
CO2: 21 MMOL/L (ref 20–31)
CREAT SERPL-MCNC: 1.24 MG/DL (ref 0.5–0.9)
DIFFERENTIAL TYPE: ABNORMAL
EOSINOPHILS RELATIVE PERCENT: 3 % (ref 1–4)
GFR AFRICAN AMERICAN: 57 ML/MIN
GFR NON-AFRICAN AMERICAN: 47 ML/MIN
GFR SERPL CREATININE-BSD FRML MDRD: ABNORMAL ML/MIN/{1.73_M2}
GFR SERPL CREATININE-BSD FRML MDRD: ABNORMAL ML/MIN/{1.73_M2}
GLOBULIN: ABNORMAL G/DL (ref 1.5–3.8)
GLUCOSE BLD-MCNC: 178 MG/DL (ref 65–105)
GLUCOSE BLD-MCNC: 190 MG/DL (ref 65–105)
GLUCOSE BLD-MCNC: 200 MG/DL (ref 65–105)
GLUCOSE BLD-MCNC: 221 MG/DL (ref 70–99)
GLUCOSE BLD-MCNC: 263 MG/DL (ref 65–105)
HCT VFR BLD CALC: 28 % (ref 36.3–47.1)
HEMOGLOBIN: 8.9 G/DL (ref 11.9–15.1)
IMMATURE GRANULOCYTES: 0 %
LYMPHOCYTES # BLD: 16 % (ref 24–43)
MCH RBC QN AUTO: 29.6 PG (ref 25.2–33.5)
MCHC RBC AUTO-ENTMCNC: 31.8 G/DL (ref 28.4–34.8)
MCV RBC AUTO: 93 FL (ref 82.6–102.9)
MONOCYTES # BLD: 7 % (ref 3–12)
NRBC AUTOMATED: 0 PER 100 WBC
PDW BLD-RTO: 13.7 % (ref 11.8–14.4)
PHOSPHORUS: 3.6 MG/DL (ref 2.6–4.5)
PLATELET # BLD: 319 K/UL (ref 138–453)
PLATELET ESTIMATE: ABNORMAL
PMV BLD AUTO: 10.7 FL (ref 8.1–13.5)
POTASSIUM SERPL-SCNC: 4.6 MMOL/L (ref 3.7–5.3)
RBC # BLD: 3.01 M/UL (ref 3.95–5.11)
RBC # BLD: ABNORMAL 10*6/UL
SEG NEUTROPHILS: 74 % (ref 36–65)
SEGMENTED NEUTROPHILS ABSOLUTE COUNT: 7.31 K/UL (ref 1.5–8.1)
SODIUM BLD-SCNC: 137 MMOL/L (ref 135–144)
TOTAL PROTEIN: 6.2 G/DL (ref 6.4–8.3)
WBC # BLD: 9.9 K/UL (ref 3.5–11.3)
WBC # BLD: ABNORMAL 10*3/UL

## 2020-01-11 PROCEDURE — 99232 SBSQ HOSP IP/OBS MODERATE 35: CPT | Performed by: INTERNAL MEDICINE

## 2020-01-11 PROCEDURE — 6360000002 HC RX W HCPCS: Performed by: STUDENT IN AN ORGANIZED HEALTH CARE EDUCATION/TRAINING PROGRAM

## 2020-01-11 PROCEDURE — 80076 HEPATIC FUNCTION PANEL: CPT

## 2020-01-11 PROCEDURE — 6360000002 HC RX W HCPCS: Performed by: NURSE PRACTITIONER

## 2020-01-11 PROCEDURE — 6370000000 HC RX 637 (ALT 250 FOR IP): Performed by: STUDENT IN AN ORGANIZED HEALTH CARE EDUCATION/TRAINING PROGRAM

## 2020-01-11 PROCEDURE — 82248 BILIRUBIN DIRECT: CPT

## 2020-01-11 PROCEDURE — 6370000000 HC RX 637 (ALT 250 FOR IP): Performed by: INTERNAL MEDICINE

## 2020-01-11 PROCEDURE — 84100 ASSAY OF PHOSPHORUS: CPT

## 2020-01-11 PROCEDURE — 80069 RENAL FUNCTION PANEL: CPT

## 2020-01-11 PROCEDURE — 2580000003 HC RX 258: Performed by: STUDENT IN AN ORGANIZED HEALTH CARE EDUCATION/TRAINING PROGRAM

## 2020-01-11 PROCEDURE — 86140 C-REACTIVE PROTEIN: CPT

## 2020-01-11 PROCEDURE — 82947 ASSAY GLUCOSE BLOOD QUANT: CPT

## 2020-01-11 PROCEDURE — 1200000000 HC SEMI PRIVATE

## 2020-01-11 PROCEDURE — 36415 COLL VENOUS BLD VENIPUNCTURE: CPT

## 2020-01-11 PROCEDURE — 80053 COMPREHEN METABOLIC PANEL: CPT

## 2020-01-11 PROCEDURE — 85025 COMPLETE CBC W/AUTO DIFF WBC: CPT

## 2020-01-11 RX ORDER — GABAPENTIN 300 MG/1
300 CAPSULE ORAL EVERY MORNING
Status: DISCONTINUED | OUTPATIENT
Start: 2020-01-11 | End: 2020-01-12 | Stop reason: HOSPADM

## 2020-01-11 RX ORDER — CARVEDILOL 25 MG/1
25 TABLET ORAL 2 TIMES DAILY WITH MEALS
Status: DISCONTINUED | OUTPATIENT
Start: 2020-01-11 | End: 2020-01-11

## 2020-01-11 RX ORDER — GABAPENTIN 300 MG/1
900 CAPSULE ORAL EVERY EVENING
Status: DISCONTINUED | OUTPATIENT
Start: 2020-01-11 | End: 2020-01-12 | Stop reason: HOSPADM

## 2020-01-11 RX ORDER — ESCITALOPRAM OXALATE 20 MG/1
20 TABLET ORAL DAILY
Status: ON HOLD | COMMUNITY
End: 2020-01-12 | Stop reason: HOSPADM

## 2020-01-11 RX ORDER — GABAPENTIN 300 MG/1
900 CAPSULE ORAL EVERY EVENING
COMMUNITY

## 2020-01-11 RX ORDER — CARVEDILOL 12.5 MG/1
12.5 TABLET ORAL ONCE
Status: COMPLETED | OUTPATIENT
Start: 2020-01-11 | End: 2020-01-11

## 2020-01-11 RX ORDER — LISINOPRIL 20 MG/1
40 TABLET ORAL DAILY
Status: DISCONTINUED | OUTPATIENT
Start: 2020-01-11 | End: 2020-01-11

## 2020-01-11 RX ORDER — LEVOTHYROXINE SODIUM 0.1 MG/1
100 TABLET ORAL DAILY
Status: DISCONTINUED | OUTPATIENT
Start: 2020-01-11 | End: 2020-01-12 | Stop reason: HOSPADM

## 2020-01-11 RX ORDER — AMLODIPINE BESYLATE 10 MG/1
10 TABLET ORAL DAILY
Status: DISCONTINUED | OUTPATIENT
Start: 2020-01-11 | End: 2020-01-12 | Stop reason: HOSPADM

## 2020-01-11 RX ORDER — LISINOPRIL 40 MG/1
40 TABLET ORAL DAILY
Status: ON HOLD | COMMUNITY
End: 2020-01-12 | Stop reason: SDUPTHER

## 2020-01-11 RX ORDER — GABAPENTIN 300 MG/1
300 CAPSULE ORAL EVERY MORNING
COMMUNITY

## 2020-01-11 RX ORDER — INSULIN GLARGINE 100 [IU]/ML
80 INJECTION, SOLUTION SUBCUTANEOUS 2 TIMES DAILY
Status: DISCONTINUED | OUTPATIENT
Start: 2020-01-11 | End: 2020-01-12 | Stop reason: HOSPADM

## 2020-01-11 RX ORDER — METHOCARBAMOL 750 MG/1
750 TABLET, FILM COATED ORAL 4 TIMES DAILY
Status: ON HOLD | COMMUNITY
End: 2020-01-12 | Stop reason: SDUPTHER

## 2020-01-11 RX ORDER — ATORVASTATIN CALCIUM 40 MG/1
40 TABLET, FILM COATED ORAL DAILY
Status: DISCONTINUED | OUTPATIENT
Start: 2020-01-11 | End: 2020-01-12 | Stop reason: HOSPADM

## 2020-01-11 RX ORDER — ATORVASTATIN CALCIUM 40 MG/1
40 TABLET, FILM COATED ORAL DAILY
Status: ON HOLD | COMMUNITY
End: 2020-01-12 | Stop reason: SDUPTHER

## 2020-01-11 RX ORDER — AMLODIPINE BESYLATE 10 MG/1
10 TABLET ORAL DAILY
Status: ON HOLD | COMMUNITY
End: 2020-01-12 | Stop reason: SDUPTHER

## 2020-01-11 RX ORDER — CARVEDILOL 25 MG/1
37.5 TABLET ORAL 2 TIMES DAILY WITH MEALS
Status: ON HOLD | COMMUNITY
End: 2020-01-12 | Stop reason: HOSPADM

## 2020-01-11 RX ORDER — DIPHENHYDRAMINE HCL 50 MG
50 CAPSULE ORAL NIGHTLY PRN
COMMUNITY

## 2020-01-11 RX ORDER — METHOCARBAMOL 750 MG/1
750 TABLET, FILM COATED ORAL 4 TIMES DAILY PRN
Status: DISCONTINUED | OUTPATIENT
Start: 2020-01-11 | End: 2020-01-12 | Stop reason: HOSPADM

## 2020-01-11 RX ORDER — ESCITALOPRAM OXALATE 10 MG/1
20 TABLET ORAL DAILY
Status: DISCONTINUED | OUTPATIENT
Start: 2020-01-11 | End: 2020-01-12 | Stop reason: HOSPADM

## 2020-01-11 RX ORDER — DULOXETIN HYDROCHLORIDE 30 MG/1
60 CAPSULE, DELAYED RELEASE ORAL DAILY
Status: DISCONTINUED | OUTPATIENT
Start: 2020-01-11 | End: 2020-01-12 | Stop reason: HOSPADM

## 2020-01-11 RX ORDER — INSULIN GLARGINE 100 [IU]/ML
80 INJECTION, SOLUTION SUBCUTANEOUS 2 TIMES DAILY
COMMUNITY

## 2020-01-11 RX ORDER — DULOXETIN HYDROCHLORIDE 60 MG/1
60 CAPSULE, DELAYED RELEASE ORAL DAILY
Status: ON HOLD | COMMUNITY
End: 2020-01-12 | Stop reason: SDUPTHER

## 2020-01-11 RX ORDER — LEVOTHYROXINE SODIUM 0.1 MG/1
100 TABLET ORAL DAILY
Status: ON HOLD | COMMUNITY
End: 2020-01-12 | Stop reason: SDUPTHER

## 2020-01-11 RX ORDER — GLIPIZIDE 10 MG/1
10 TABLET ORAL
Status: DISCONTINUED | OUTPATIENT
Start: 2020-01-11 | End: 2020-01-11

## 2020-01-11 RX ORDER — CARVEDILOL 25 MG/1
25 TABLET ORAL 2 TIMES DAILY WITH MEALS
Status: DISCONTINUED | OUTPATIENT
Start: 2020-01-11 | End: 2020-01-12 | Stop reason: HOSPADM

## 2020-01-11 RX ADMIN — APIXABAN 2.5 MG: 2.5 TABLET, FILM COATED ORAL at 21:58

## 2020-01-11 RX ADMIN — INSULIN LISPRO 9 UNITS: 100 INJECTION, SOLUTION INTRAVENOUS; SUBCUTANEOUS at 13:53

## 2020-01-11 RX ADMIN — LEVOTHYROXINE SODIUM 50 MCG: 100 TABLET ORAL at 08:04

## 2020-01-11 RX ADMIN — LEVOTHYROXINE SODIUM 100 MCG: 100 TABLET ORAL at 10:17

## 2020-01-11 RX ADMIN — SODIUM CHLORIDE, PRESERVATIVE FREE 10 ML: 5 INJECTION INTRAVENOUS at 08:06

## 2020-01-11 RX ADMIN — TIZANIDINE 4 MG: 4 TABLET ORAL at 22:02

## 2020-01-11 RX ADMIN — CARVEDILOL 12.5 MG: 12.5 TABLET, FILM COATED ORAL at 10:18

## 2020-01-11 RX ADMIN — POLYETHYLENE GLYCOL 3350 17 G: 17 POWDER, FOR SOLUTION ORAL at 08:05

## 2020-01-11 RX ADMIN — CARVEDILOL 12.5 MG: 12.5 TABLET, FILM COATED ORAL at 08:04

## 2020-01-11 RX ADMIN — AMLODIPINE BESYLATE 10 MG: 10 TABLET ORAL at 10:18

## 2020-01-11 RX ADMIN — INSULIN LISPRO 3 UNITS: 100 INJECTION, SOLUTION INTRAVENOUS; SUBCUTANEOUS at 09:29

## 2020-01-11 RX ADMIN — GLIPIZIDE 10 MG: 10 TABLET ORAL at 09:57

## 2020-01-11 RX ADMIN — DULOXETINE HYDROCHLORIDE 60 MG: 30 CAPSULE, DELAYED RELEASE ORAL at 10:17

## 2020-01-11 RX ADMIN — INSULIN LISPRO 3 UNITS: 100 INJECTION, SOLUTION INTRAVENOUS; SUBCUTANEOUS at 16:09

## 2020-01-11 RX ADMIN — DOCUSATE SODIUM 100 MG: 100 CAPSULE, LIQUID FILLED ORAL at 08:04

## 2020-01-11 RX ADMIN — FLUCONAZOLE 200 MG: 200 TABLET ORAL at 08:05

## 2020-01-11 RX ADMIN — INSULIN LISPRO 2 UNITS: 100 INJECTION, SOLUTION INTRAVENOUS; SUBCUTANEOUS at 21:23

## 2020-01-11 RX ADMIN — INSULIN GLARGINE 80 UNITS: 100 INJECTION, SOLUTION SUBCUTANEOUS at 21:23

## 2020-01-11 RX ADMIN — AMOXICILLIN AND CLAVULANATE POTASSIUM 1 TABLET: 500; 125 TABLET, FILM COATED ORAL at 21:22

## 2020-01-11 RX ADMIN — HYDROCODONE BITARTRATE AND ACETAMINOPHEN 2 TABLET: 5; 325 TABLET ORAL at 16:08

## 2020-01-11 RX ADMIN — DESMOPRESSIN ACETATE 40 MG: 0.2 TABLET ORAL at 21:23

## 2020-01-11 RX ADMIN — DOCUSATE SODIUM 100 MG: 100 CAPSULE, LIQUID FILLED ORAL at 21:23

## 2020-01-11 RX ADMIN — HYDROCODONE BITARTRATE AND ACETAMINOPHEN 2 TABLET: 5; 325 TABLET ORAL at 10:22

## 2020-01-11 RX ADMIN — HYDROMORPHONE HYDROCHLORIDE 1 MG: 1 INJECTION, SOLUTION INTRAMUSCULAR; INTRAVENOUS; SUBCUTANEOUS at 19:19

## 2020-01-11 RX ADMIN — HYDROMORPHONE HYDROCHLORIDE 1 MG: 1 INJECTION, SOLUTION INTRAMUSCULAR; INTRAVENOUS; SUBCUTANEOUS at 13:56

## 2020-01-11 RX ADMIN — HYDROMORPHONE HYDROCHLORIDE 1 MG: 1 INJECTION, SOLUTION INTRAMUSCULAR; INTRAVENOUS; SUBCUTANEOUS at 08:14

## 2020-01-11 RX ADMIN — HYDROCODONE BITARTRATE AND ACETAMINOPHEN 2 TABLET: 5; 325 TABLET ORAL at 21:29

## 2020-01-11 RX ADMIN — AMOXICILLIN AND CLAVULANATE POTASSIUM 1 TABLET: 500; 125 TABLET, FILM COATED ORAL at 13:57

## 2020-01-11 RX ADMIN — APIXABAN 2.5 MG: 2.5 TABLET, FILM COATED ORAL at 08:04

## 2020-01-11 RX ADMIN — GABAPENTIN 900 MG: 300 CAPSULE ORAL at 19:07

## 2020-01-11 RX ADMIN — ESCITALOPRAM OXALATE 20 MG: 10 TABLET ORAL at 11:05

## 2020-01-11 RX ADMIN — SODIUM CHLORIDE, PRESERVATIVE FREE 10 ML: 5 INJECTION INTRAVENOUS at 21:24

## 2020-01-11 RX ADMIN — HYDROMORPHONE HYDROCHLORIDE 1 MG: 1 INJECTION, SOLUTION INTRAMUSCULAR; INTRAVENOUS; SUBCUTANEOUS at 05:16

## 2020-01-11 RX ADMIN — LISINOPRIL 40 MG: 20 TABLET ORAL at 08:05

## 2020-01-11 RX ADMIN — METHOCARBAMOL TABLETS 750 MG: 750 TABLET, COATED ORAL at 10:16

## 2020-01-11 RX ADMIN — TIZANIDINE 4 MG: 4 TABLET ORAL at 10:36

## 2020-01-11 RX ADMIN — ANTI-FUNGAL POWDER MICONAZOLE NITRATE TALC FREE: 1.42 POWDER TOPICAL at 21:24

## 2020-01-11 RX ADMIN — ASPIRIN 81 MG 81 MG: 81 TABLET ORAL at 08:05

## 2020-01-11 RX ADMIN — CARVEDILOL 25 MG: 25 TABLET, FILM COATED ORAL at 21:23

## 2020-01-11 RX ADMIN — AMOXICILLIN AND CLAVULANATE POTASSIUM 1 TABLET: 500; 125 TABLET, FILM COATED ORAL at 05:18

## 2020-01-11 RX ADMIN — ANTI-FUNGAL POWDER MICONAZOLE NITRATE TALC FREE: 1.42 POWDER TOPICAL at 08:06

## 2020-01-11 RX ADMIN — ONDANSETRON 4 MG: 2 INJECTION INTRAMUSCULAR; INTRAVENOUS at 08:03

## 2020-01-11 RX ADMIN — HYDROCODONE BITARTRATE AND ACETAMINOPHEN 2 TABLET: 5; 325 TABLET ORAL at 23:48

## 2020-01-11 RX ADMIN — GABAPENTIN 300 MG: 300 CAPSULE ORAL at 10:16

## 2020-01-11 ASSESSMENT — PAIN SCALES - GENERAL
PAINLEVEL_OUTOF10: 7
PAINLEVEL_OUTOF10: 8
PAINLEVEL_OUTOF10: 7
PAINLEVEL_OUTOF10: 5
PAINLEVEL_OUTOF10: 7
PAINLEVEL_OUTOF10: 7
PAINLEVEL_OUTOF10: 10
PAINLEVEL_OUTOF10: 7
PAINLEVEL_OUTOF10: 0

## 2020-01-11 ASSESSMENT — PAIN DESCRIPTION - LOCATION: LOCATION: FOOT

## 2020-01-11 ASSESSMENT — PAIN DESCRIPTION - PAIN TYPE: TYPE: ACUTE PAIN

## 2020-01-11 NOTE — PROGRESS NOTES
[facility] - Progress Note    Today's Date and Time: 1/11/2020 , 2:42 PM    Impression :   · Rt great toe diabetic infection  · Foot cellulitis  · Gas formation in tissues. Infection with MSSA, Strep Grp B and Candida. · S/P I&D per Podiatry 12-31  · S/P TMA 1-9-20  · DM II not controlled  · CAD s/p stenting x 2  · Non compliance    Recommendations:     · D/C IV antibiotics  · Augmentin 500 mg po TID. Stop date 1-25-20  · Diflucan 200 mg po daily. Stop date 1-25-20  · Office f/up in 1 weeks with Dr Bob Lima for infection. Please call [phone] for appointment  ·     Medical Decision Making/Summary/Discussion:1/11/2020     · Patient with DM 2, non compliance  · Has DM  Neuropathy. Developed diabetic foot infection which failed to respond to antibiotic Rx. · Mixed bacterial infection with MSSA, Strep Grp B and Candida albicans  · Required toe amputation followed by TMA  · Patient doing well post TMA  · Will switch to po Augmentin and Diflucan. · Home on 1-11-20 with follow up by Podiatry and our service  · Wound care and non weight bearing  Infection Control Recommendations   · [place] Precautions    Antimicrobial Stewardship Recommendations     · Targeted therapy  · Restricted medication    Coordination of Outpatient Care:   · Estimated Length of po antimicrobials: 1-25-20  · Patient will need Midline Catheter Insertion:  No  · Patient will need PICC line Insertion: No  · Patient will need: Home IV , [place],  SNF,  LTAC: No  · Patient will need outpatient wound care: TBD    Chief complaint/reason for consultation:   · Diabetic foot infection, uncontrolled DM      History of Present Illness:   Parker Peñaloza is a 38y.o.-year-old  female who was initially admitted on 12/30/2019. Patient seen at the request of Dr. Paramjit Gaytan:    Pt is a 39 yo woman visiting from [place]. She noted some Rt great toe pain on 12-24.    By 12-30 the pain had TECHNIQUE:   Multiplanar multisequence MRI of the right foot was performed without and   with the administration of intravenous contrast.       COMPARISON:   Right foot radiographs 12/30/2019.       HISTORY:   ORDERING SYSTEM PROVIDED HISTORY: right foot wound   TECHNOLOGIST PROVIDED HISTORY:   right foot wound   What is the area of interest?->Forefoot   Is the patient pregnant?->No       The patient reported to the MRI tech that she had complaints involving the   heel and midfoot so a hindfoot study was performed.  The clinician has   requested that the study be repeated as a forefoot.       FINDINGS:   PLANTAR FASCIA: Mild chronic thickening of the central cord of the plantar   fascia.  No acute plantar fasciitis or plantar fascia tear.  Severe fatty   atrophy of the abductor digiti minimi muscle.       ACHILLES TENDON: The Achilles tendon is normal in position, morphology and   signal.  No associated bursitis.       BONE MARROW: No fracture or dislocation.  No abnormal marrow space-occupying   lesion.  No osseous erosion.       JOINT SPACES: The tibiotalar and subtalar joints are normal in appearance.    The talonavicular and calcaneocuboid joints are unremarkable.  The midfoot is   unremarkable.  No joint effusion.       SYNDESMOTIC LIGAMENTS: The anterior and posterior inferior tibiofibular   ligaments are intact.       LATERAL COLLATERAL LIGAMENT COMPLEX: The anterior talofibular ligament,   calcaneofibular ligament and posterior talofibular ligaments are without   acute abnormality.       DELTOID LIGAMENT COMPLEX:  The superficial and deep components of the deltoid   ligament complex are normal.       SINUS TARSI AND SPRING LIGAMENT: The sinus tarsi fat plug is grossly   preserved.  The spring ligament complex is intact.       MEDIAL TENDONS: The posterior tibialis, flexor digitorum longus and flexor   hallucis longus tendons are intact.       LATERAL TENDONS:  The peroneus longus and brevis tendons are intact.       ANTERIOR TENDONS: The tibialis anterior, extensor hallucis longus and   extensor digitorum longus tendons are normal in position, morphology and   signal.       TARSAL TUNNEL: There are no obstructing lesions within the tarsal tunnel.       MISCELLANEOUS: Circumferential subcutaneous edema about the ankle and along   the dorsum of the midfoot.  No soft tissue ulceration, sinus tract, or   drainable fluid collection.  No abnormal soft tissue mass.           Impression   1. No osteomyelitis or other acute osseous abnormality of the hindfoot.    2. Circumferential subcutaneous edema about the ankle and along the dorsum of   the midfoot compatible with bland edema versus cellulitis.  No deep soft   tissue ulceration or drainable fluid collection.         EXAMINATION:   MRI OF THE RIGHT FOREFOOT WITHOUT AND WITH CONTRAST, 1/2/2020 10:00 am       TECHNIQUE:   Multiplanar multisequence MRI of the right forefoot was performed without and   with the administration of intravenous contrast.       COMPARISON:   12/31/2019 hindfoot MRI       HISTORY:   ORDERING SYSTEM PROVIDED HISTORY: Wound   TECHNOLOGIST PROVIDED HISTORY:   Wound   What is the area of interest?->Forefoot   Is the patient pregnant?->No       Wound at the plantar aspect of the great toe.       FINDINGS:   There is mild subcutaneous edema in the dorsal and plantar forefoot.  Focal   confluent soft tissue edema measuring approximately 1.9 x 0.8 x 2.1 cm is   seen in the soft tissues at the plantar aspect of the proximal great toe,   superficial to the flexor tendon, adjacent to the proximal phalanx.  There is   appears to be intervening fat on the T1 weighted sequences.  No definite   organized fluid collection identified in the soft tissues.  No abnormal   enhancement.  There are a couple low signal foci within the plantar soft   tissues of the great toe, somewhat linear appearance, suggesting a vascular   structure, but possibility of soft tissue

## 2020-01-11 NOTE — PROGRESS NOTES
Progress Note  Podiatric Medicine and Surgery     Subjective     CC: R foot diabetic wound    POD#2 s/p TMA w/ ZAIRE, RLE (DOS: 1/9/20). POD #5 s/p I&D w/ R hallux amp (DOS: 1/6/20). Afebrile, VSS. In good spirits this AM.  Denies n/v/f/c, relates R foot pain controlled w/ current regimen. Denies issue w/ UOP/BM, tolerating PO diet. HPI :  Facundo Rouse is a 38 y.o. female with a past medical history of DM2 with neuropathy was seen at [facility] for right foot pain and wound. Pt states that about 1 week ago her right great toe became very painful and this morning she noted worsening pain and new redness of her right great toe. Pt denies any trauma or stepping on anything sharp. She notes her redness progressively has moved up towards her ankle. Patient is originally from [place] and has moved here, she currently does not have her insulin. Pt is also complaining of calf pain. She has a history of a DVT and PE. Pt denies any N/V/F/C/CP/SOB.     PCP is Nelda Reza, KAYLEE - CNP    ROS: Denies N/V/F/C/SOB/CP. Otherwise negative except at stated in the HPI.      Medications:  Scheduled Meds:   carvedilol  12.5 mg Oral Once    amLODIPine  10 mg Oral Daily    atorvastatin  40 mg Oral Daily    carvedilol  25 mg Oral BID WC    DULoxetine  60 mg Oral Daily    escitalopram  20 mg Oral Daily    gabapentin  300 mg Oral QAM    gabapentin  900 mg Oral QPM    levothyroxine  100 mcg Oral Daily    insulin glargine  80 Units Subcutaneous BID    [START ON 1/13/2020] metFORMIN  1,000 mg Oral BID WC    lisinopril  40 mg Oral Daily    fluconazole  200 mg Oral Daily    amoxicillin-clavulanate  1 tablet Oral 3 times per day    docusate sodium  100 mg Oral BID    miconazole   Topical BID    sodium chloride flush  10 mL Intravenous BID    aspirin  81 mg Oral Daily    apixaban  2.5 mg Oral BID    insulin lispro  0-18 Units Subcutaneous TID WC    insulin lispro  0-9 Units Subcutaneous Nightly       Continuous forefoot. Mild non-pitting edema to R foot consistent with post-op course.     Neuro: Saph/sural/SP/DP/plantar sensation diminished to light touch.     Musculoskeletal: Muscle strength 5/5 to RLE. Minimal TTP to R TMA/ZAIRE surgical site. S/p R TMA w/ ZAIRE.    Dermatologic: R foot TMA/ZAIRE sites are well-coapted with sutures intact, no dehiscence. Approximately 4 cc of serosanguinous output in TLS drain, no changes required overnight. Erythema rebel-incision is improved, no purulence, malodor or fluctuance. Left foot is warm and dry with no open wounds    Clinical Images:                 Imaging:   US RENAL COMPLETE   Final Result   Unremarkable ultrasound of the kidneys and urinary bladder. VL LOWER EXTREMITY ARTERIAL SEGMENTAL PRESSURES W PPG BILATERAL   Final Result      XR FOOT RIGHT (MIN 3 VIEWS)   Final Result   1. Soft tissue swelling and gas in the great toe, extending to the level of   the 1st metatarsal head, concerning for gas gangrene. 2.  No radiographic evidence of osteomyelitis. MRI FOOT RIGHT W WO CONTRAST   Final Result   1. No evidence of osteomyelitis in the right forefoot. 2. Focal confluent soft tissue edema at the plantar aspect of the proximal   great toe, possibly focal cellulitis or phlegmon. No definite organized   fluid collection to indicate abscess at this time. This area measures   approximately 1.9 x 0.8 x 2.1 cm.   3. A couple low signal foci are seen within the plantar soft tissues of the   proximal great toe, favored to be vascular structures but possibility of soft   tissue gas is not entirely excluded. 4. Small joint effusion at the 1st MTP joint. MRI FOOT RIGHT W WO CONTRAST   Final Result   1. No osteomyelitis or other acute osseous abnormality of the hindfoot. 2. Circumferential subcutaneous edema about the ankle and along the dorsum of   the midfoot compatible with bland edema versus cellulitis.   No deep soft   tissue ulceration or drainable fluid collection. VL DUP LOWER EXTREMITY VENOUS RIGHT   Final Result      XR FOOT RIGHT (MIN 3 VIEWS)   Final Result   Soft tissue edema surrounding the 1st digit without evidence for radiopaque   foreign body or subcutaneous air. Cultures:   12/31/2019 R hallux wound cx: Group B Strep, Candida Albicans, S. Aureus, Prevotella  12/31/19: Blood cx x2 - ngtd  1/10/20 L forefoot surgical path - pending    Assessment   Ann Marie Raymond is a 38 y.o. female with   1. S/p TMA w/ ZAIRE, RLE (DOS: 1/9/20)   2. S/p R hallux amputation (DOS 1/6/20)  3. S/p I&D, right foot   4. Diabetic wound to the level of subcutaneous tissue, right hallux  5. Cellulitis, right foot - resolving   6. DM2 with neuropathy  7. H/o DVT  8. Chronic CHF    Principal Problem:    Cellulitis of right foot  Active Problems:    Other proteinuria    Hypoalbuminemia    Renovascular hypertension    Obesity, morbid, BMI 50 or higher ([facility])    Hypothyroidism    Diabetic polyneuropathy ([facility])    Type 2 diabetes mellitus with neurologic complication, with long-term current use of insulin ([facility])    Chronic diastolic congestive heart failure ([facility])    CAROLYN (acute kidney injury) ([facility]    Cellulitis of foot    Type 2 diabetes mellitus with right diabetic foot infection ([facility]    Septic arthritis of right foot ([facility])    Equinus contracture of right ankle  Resolved Problems:    Foot infection    Hyperglycemia    Non compliance w medication regimen    H/O deep venous thrombosis    Plan     · Patient examined and evaluated at bedside  · Treatment options discussed in detail with the patient  · Medical management per primary  · ID following - PO Augmentin & PO Diflucan  · RLE post-op dressing changed: Adaptic/DSD/ACE/Posterior splint  · Strict NWB to R foot in surgical shoe w/ walker  · Erythema is improved from previous exam but not completely resolved. Patient is ok for discharge from podiatry standpoint with PO abx per ID recommendations.  She is

## 2020-01-12 VITALS
HEART RATE: 72 BPM | TEMPERATURE: 98.3 F | DIASTOLIC BLOOD PRESSURE: 100 MMHG | OXYGEN SATURATION: 98 % | WEIGHT: 293 LBS | HEIGHT: 61 IN | SYSTOLIC BLOOD PRESSURE: 157 MMHG | BODY MASS INDEX: 55.32 KG/M2 | RESPIRATION RATE: 16 BRPM

## 2020-01-12 PROBLEM — N18.30 CKD (CHRONIC KIDNEY DISEASE), STAGE III (HCC): Status: ACTIVE | Noted: 2020-01-12

## 2020-01-12 LAB
ABSOLUTE EOS #: 0.35 K/UL (ref 0–0.44)
ABSOLUTE IMMATURE GRANULOCYTE: 0.05 K/UL (ref 0–0.3)
ABSOLUTE LYMPH #: 1.77 K/UL (ref 1.1–3.7)
ABSOLUTE MONO #: 0.59 K/UL (ref 0.1–1.2)
ALBUMIN SERPL-MCNC: 1.4 G/DL (ref 3.5–5.2)
ANION GAP SERPL CALCULATED.3IONS-SCNC: 11 MMOL/L (ref 9–17)
BASOPHILS # BLD: 0 % (ref 0–2)
BASOPHILS ABSOLUTE: <0.03 K/UL (ref 0–0.2)
BUN BLDV-MCNC: 20 MG/DL (ref 6–20)
BUN/CREAT BLD: ABNORMAL (ref 9–20)
C-REACTIVE PROTEIN: 144 MG/L (ref 0–5)
CALCIUM SERPL-MCNC: 8.4 MG/DL (ref 8.6–10.4)
CHLORIDE BLD-SCNC: 104 MMOL/L (ref 98–107)
CO2: 21 MMOL/L (ref 20–31)
CREAT SERPL-MCNC: 1.58 MG/DL (ref 0.5–0.9)
DIFFERENTIAL TYPE: ABNORMAL
EOSINOPHILS RELATIVE PERCENT: 4 % (ref 1–4)
GFR AFRICAN AMERICAN: 43 ML/MIN
GFR NON-AFRICAN AMERICAN: 35 ML/MIN
GFR SERPL CREATININE-BSD FRML MDRD: ABNORMAL ML/MIN/{1.73_M2}
GFR SERPL CREATININE-BSD FRML MDRD: ABNORMAL ML/MIN/{1.73_M2}
GLUCOSE BLD-MCNC: 173 MG/DL (ref 65–105)
GLUCOSE BLD-MCNC: 189 MG/DL (ref 70–99)
GLUCOSE BLD-MCNC: 230 MG/DL (ref 65–105)
GLUCOSE BLD-MCNC: 286 MG/DL (ref 65–105)
HCT VFR BLD CALC: 30.9 % (ref 36.3–47.1)
HEMOGLOBIN: 9.5 G/DL (ref 11.9–15.1)
IMMATURE GRANULOCYTES: 1 %
LYMPHOCYTES # BLD: 22 % (ref 24–43)
MCH RBC QN AUTO: 30.8 PG (ref 25.2–33.5)
MCHC RBC AUTO-ENTMCNC: 30.7 G/DL (ref 28.4–34.8)
MCV RBC AUTO: 100.3 FL (ref 82.6–102.9)
MONOCYTES # BLD: 7 % (ref 3–12)
NRBC AUTOMATED: 0 PER 100 WBC
PDW BLD-RTO: 14.4 % (ref 11.8–14.4)
PHOSPHORUS: 4.2 MG/DL (ref 2.6–4.5)
PLATELET # BLD: 343 K/UL (ref 138–453)
PLATELET ESTIMATE: ABNORMAL
PMV BLD AUTO: 10.3 FL (ref 8.1–13.5)
POTASSIUM SERPL-SCNC: 4.7 MMOL/L (ref 3.7–5.3)
RBC # BLD: 3.08 M/UL (ref 3.95–5.11)
RBC # BLD: ABNORMAL 10*6/UL
SEG NEUTROPHILS: 66 % (ref 36–65)
SEGMENTED NEUTROPHILS ABSOLUTE COUNT: 5.36 K/UL (ref 1.5–8.1)
SODIUM BLD-SCNC: 136 MMOL/L (ref 135–144)
WBC # BLD: 8.1 K/UL (ref 3.5–11.3)
WBC # BLD: ABNORMAL 10*3/UL

## 2020-01-12 PROCEDURE — 36415 COLL VENOUS BLD VENIPUNCTURE: CPT

## 2020-01-12 PROCEDURE — 6370000000 HC RX 637 (ALT 250 FOR IP): Performed by: INTERNAL MEDICINE

## 2020-01-12 PROCEDURE — 86140 C-REACTIVE PROTEIN: CPT

## 2020-01-12 PROCEDURE — 82947 ASSAY GLUCOSE BLOOD QUANT: CPT

## 2020-01-12 PROCEDURE — 6370000000 HC RX 637 (ALT 250 FOR IP): Performed by: STUDENT IN AN ORGANIZED HEALTH CARE EDUCATION/TRAINING PROGRAM

## 2020-01-12 PROCEDURE — 85025 COMPLETE CBC W/AUTO DIFF WBC: CPT

## 2020-01-12 PROCEDURE — 99239 HOSP IP/OBS DSCHRG MGMT >30: CPT | Performed by: INTERNAL MEDICINE

## 2020-01-12 PROCEDURE — 2580000003 HC RX 258: Performed by: STUDENT IN AN ORGANIZED HEALTH CARE EDUCATION/TRAINING PROGRAM

## 2020-01-12 PROCEDURE — 99232 SBSQ HOSP IP/OBS MODERATE 35: CPT | Performed by: INTERNAL MEDICINE

## 2020-01-12 PROCEDURE — 80069 RENAL FUNCTION PANEL: CPT

## 2020-01-12 RX ORDER — DULOXETIN HYDROCHLORIDE 60 MG/1
60 CAPSULE, DELAYED RELEASE ORAL DAILY
Qty: 30 CAPSULE | Refills: 0 | Status: SHIPPED | OUTPATIENT
Start: 2020-01-12

## 2020-01-12 RX ORDER — ASPIRIN 81 MG/1
81 TABLET, CHEWABLE ORAL DAILY
Qty: 30 TABLET | Refills: 0 | Status: SHIPPED | OUTPATIENT
Start: 2020-01-12

## 2020-01-12 RX ORDER — CARVEDILOL 25 MG/1
25 TABLET ORAL 2 TIMES DAILY WITH MEALS
Qty: 60 TABLET | Refills: 0 | Status: SHIPPED | OUTPATIENT
Start: 2020-01-12

## 2020-01-12 RX ORDER — AMLODIPINE BESYLATE 10 MG/1
10 TABLET ORAL DAILY
Qty: 30 TABLET | Refills: 0 | Status: SHIPPED | OUTPATIENT
Start: 2020-01-12

## 2020-01-12 RX ORDER — METHOCARBAMOL 750 MG/1
750 TABLET, FILM COATED ORAL 4 TIMES DAILY
Qty: 120 TABLET | Refills: 0 | Status: SHIPPED | OUTPATIENT
Start: 2020-01-12

## 2020-01-12 RX ORDER — LEVOTHYROXINE SODIUM 0.1 MG/1
100 TABLET ORAL DAILY
Qty: 30 TABLET | Refills: 0 | Status: SHIPPED | OUTPATIENT
Start: 2020-01-12

## 2020-01-12 RX ORDER — AMOXICILLIN AND CLAVULANATE POTASSIUM 500; 125 MG/1; MG/1
1 TABLET, FILM COATED ORAL EVERY 8 HOURS SCHEDULED
Qty: 39 TABLET | Refills: 0 | Status: SHIPPED | OUTPATIENT
Start: 2020-01-12 | End: 2020-01-25

## 2020-01-12 RX ORDER — FLUCONAZOLE 200 MG/1
200 TABLET ORAL DAILY
Qty: 12 TABLET | Refills: 0 | Status: SHIPPED | OUTPATIENT
Start: 2020-01-13 | End: 2020-01-25

## 2020-01-12 RX ORDER — HYDROCODONE BITARTRATE AND ACETAMINOPHEN 5; 325 MG/1; MG/1
1 TABLET ORAL
Qty: 12 TABLET | Refills: 0 | Status: SHIPPED | OUTPATIENT
Start: 2020-01-12 | End: 2020-01-15

## 2020-01-12 RX ORDER — NYSTATIN 100000 [USP'U]/G
POWDER TOPICAL
Qty: 1 BOTTLE | Refills: 0 | Status: SHIPPED | OUTPATIENT
Start: 2020-01-12

## 2020-01-12 RX ORDER — ATORVASTATIN CALCIUM 40 MG/1
40 TABLET, FILM COATED ORAL DAILY
Qty: 30 TABLET | Refills: 3 | Status: SHIPPED | OUTPATIENT
Start: 2020-01-12

## 2020-01-12 RX ORDER — LISINOPRIL 40 MG/1
40 TABLET ORAL DAILY
Qty: 30 TABLET | Refills: 0 | Status: SHIPPED | OUTPATIENT
Start: 2020-01-12

## 2020-01-12 RX ADMIN — HYDROCODONE BITARTRATE AND ACETAMINOPHEN 2 TABLET: 5; 325 TABLET ORAL at 12:09

## 2020-01-12 RX ADMIN — AMOXICILLIN AND CLAVULANATE POTASSIUM 1 TABLET: 500; 125 TABLET, FILM COATED ORAL at 06:12

## 2020-01-12 RX ADMIN — TIZANIDINE 4 MG: 4 TABLET ORAL at 09:09

## 2020-01-12 RX ADMIN — GABAPENTIN 900 MG: 300 CAPSULE ORAL at 17:36

## 2020-01-12 RX ADMIN — CARVEDILOL 25 MG: 25 TABLET, FILM COATED ORAL at 09:08

## 2020-01-12 RX ADMIN — HYDROCODONE BITARTRATE AND ACETAMINOPHEN 2 TABLET: 5; 325 TABLET ORAL at 09:11

## 2020-01-12 RX ADMIN — INSULIN GLARGINE 80 UNITS: 100 INJECTION, SOLUTION SUBCUTANEOUS at 09:16

## 2020-01-12 RX ADMIN — INSULIN LISPRO 9 UNITS: 100 INJECTION, SOLUTION INTRAVENOUS; SUBCUTANEOUS at 12:51

## 2020-01-12 RX ADMIN — LISINOPRIL 40 MG: 20 TABLET ORAL at 09:08

## 2020-01-12 RX ADMIN — AMLODIPINE BESYLATE 10 MG: 10 TABLET ORAL at 09:08

## 2020-01-12 RX ADMIN — SODIUM CHLORIDE, PRESERVATIVE FREE 10 ML: 5 INJECTION INTRAVENOUS at 10:18

## 2020-01-12 RX ADMIN — POLYETHYLENE GLYCOL 3350 17 G: 17 POWDER, FOR SOLUTION ORAL at 09:08

## 2020-01-12 RX ADMIN — FLUCONAZOLE 200 MG: 200 TABLET ORAL at 09:08

## 2020-01-12 RX ADMIN — INSULIN LISPRO 3 UNITS: 100 INJECTION, SOLUTION INTRAVENOUS; SUBCUTANEOUS at 09:16

## 2020-01-12 RX ADMIN — APIXABAN 2.5 MG: 2.5 TABLET, FILM COATED ORAL at 09:07

## 2020-01-12 RX ADMIN — METHOCARBAMOL TABLETS 750 MG: 750 TABLET, COATED ORAL at 09:09

## 2020-01-12 RX ADMIN — TIZANIDINE 4 MG: 4 TABLET ORAL at 06:12

## 2020-01-12 RX ADMIN — LEVOTHYROXINE SODIUM 100 MCG: 100 TABLET ORAL at 06:12

## 2020-01-12 RX ADMIN — CARVEDILOL 25 MG: 25 TABLET, FILM COATED ORAL at 17:35

## 2020-01-12 RX ADMIN — HYDROCODONE BITARTRATE AND ACETAMINOPHEN 2 TABLET: 5; 325 TABLET ORAL at 18:47

## 2020-01-12 RX ADMIN — DOCUSATE SODIUM 100 MG: 100 CAPSULE, LIQUID FILLED ORAL at 09:08

## 2020-01-12 RX ADMIN — AMOXICILLIN AND CLAVULANATE POTASSIUM 1 TABLET: 500; 125 TABLET, FILM COATED ORAL at 13:01

## 2020-01-12 RX ADMIN — DULOXETINE HYDROCHLORIDE 60 MG: 30 CAPSULE, DELAYED RELEASE ORAL at 09:06

## 2020-01-12 RX ADMIN — GABAPENTIN 300 MG: 300 CAPSULE ORAL at 09:07

## 2020-01-12 RX ADMIN — HYDROCODONE BITARTRATE AND ACETAMINOPHEN 2 TABLET: 5; 325 TABLET ORAL at 04:45

## 2020-01-12 RX ADMIN — ASPIRIN 81 MG 81 MG: 81 TABLET ORAL at 09:08

## 2020-01-12 RX ADMIN — ANTI-FUNGAL POWDER MICONAZOLE NITRATE TALC FREE: 1.42 POWDER TOPICAL at 10:18

## 2020-01-12 RX ADMIN — HYDROCODONE BITARTRATE AND ACETAMINOPHEN 2 TABLET: 5; 325 TABLET ORAL at 15:36

## 2020-01-12 RX ADMIN — ESCITALOPRAM OXALATE 20 MG: 10 TABLET ORAL at 10:22

## 2020-01-12 RX ADMIN — INSULIN LISPRO 6 UNITS: 100 INJECTION, SOLUTION INTRAVENOUS; SUBCUTANEOUS at 17:43

## 2020-01-12 ASSESSMENT — PAIN SCALES - GENERAL
PAINLEVEL_OUTOF10: 6
PAINLEVEL_OUTOF10: 5
PAINLEVEL_OUTOF10: 6

## 2020-01-12 NOTE — DISCHARGE SUMMARY
[facility]    Discharge Summary     Patient ID: Rola Aguilar  :  1975   MRN: [id number]     ACCOUNT:  [de-identified]   Patient's PCP: KAYLEE Cramer CNP  Admit Date: 2019   Discharge Date: 2020    Length of Stay: 13  Code Status:  Full Code  Admitting Physician: Ardena Favre, DO  Discharge Physician: Thaddeus Mohr DO     Active Discharge Diagnoses:     Hospital Problem Lists:  Principal Problem:    Cellulitis of right foot  Active Problems:    Renovascular hypertension    Diabetic polyneuropathy ([facility]    Type 2 diabetes mellitus with neurologic complication, with long-term current use of insulin ([facility])    Chronic diastolic congestive heart failure ([facility])    Type 2 diabetes mellitus with right diabetic foot infection ([facility]    Other proteinuria    Hypoalbuminemia    CKD (chronic kidney disease), stage III ([facility])    Obesity, morbid, BMI 50 or higher ([facility]    Hypothyroidism    CAROLYN (acute kidney injury) ([facility]    Cellulitis of foot    Septic arthritis of right foot ([facility]    Equinus contracture of right ankle  Resolved Problems:    Foot infection    Hyperglycemia    Non compliance w medication regimen    H/O deep venous thrombosis      Admission Condition:  fair     Discharged Condition: good    Hospital Stay:     Hospital Course:       Patient is a 38 yr old female who presents to [address] [place] [address] with right foot pain. Right great toe noted to be deep red/pruple in color with swelling extending into dorsum of foot along with red/purple coloring. Pulses are palpable, brisk cap refill, and warmth noted. She denies any injury to the affected toe/foot, denied any fevers, chills, N/V/D and stated the symptoms have been ongoing for approx 24 hrs prior to arrival.  She has baseline neuropathy and denies any changes in N/T. Presenting labs with elevated inflammatory markers along with significant hyperglycemia. No gas was noted on radiological imaging and doppler negative for DVT. Her comorbidities include a DVT (non-compliant with Eliquis secondary to insurance), DMII (insulin requiring, off all oral agents secondary to insurance issues), HTN, hx of TIA, hypothyroidism, PANCHO (non-compliant with CPAP). Last ECHO done in 2015: EF:55%, LVH with Diastolic Dysfunction. She was evaluated by infectious disease and podiatry. She was started on IV antibiotics and underwent wound care with podiatry. Ultimately, she underwent I&D multiple times with podiatry. She had right foot hallux amputation on 1/6. Renal function began to deteriorate, and nephrology was consulted. She underwent transmetatarsal amputation on 1/9. IV antibiotics were converted to oral antibiotics. Renal function appears to be at baseline. Gylcemic and BP control were achieved. Patient was given rx for her prescriptions, as she is planning on leaving for [place] as soon as she leaves the hospital, as she has an 6year old son at home. She will need wound care as noted below. Due to her comorbidities, she will likely have multiple future admissions.     Significant therapeutic interventions:   - Incisions and drainages with podiatry  - Transmetatarsal amputation - 1/09  - Glycemic control  - Pain control  - BP control    Significant Diagnostic Studies:   Labs / Micro:  CBC:   Lab Results   Component Value Date    WBC 8.1 01/12/2020    RBC 3.08 01/12/2020    HGB 9.5 01/12/2020    HCT 30.9 01/12/2020    .3 01/12/2020    MCH 30.8 01/12/2020    MCHC 30.7 01/12/2020    RDW 14.4 01/12/2020     01/12/2020     HFP:    Lab Results   Component Value Date    PROT 6.2 01/11/2020     CMP:    Lab Results   Component Value Date    GLUCOSE 189 01/12/2020     01/12/2020    K 4.7 01/12/2020     01/12/2020    CO2 21 01/12/2020    BUN 20 01/12/2020    CREATININE 1.58 01/12/2020    ANIONGAP 11 01/12/2020    ALKPHOS 177 01/11/2020    ALT 12 01/11/2020    AST 20

## 2020-01-12 NOTE — PROGRESS NOTES
Medical History:   has a past medical history of Hyperlipidemia, Hypertension, Hypothyroidism, Type II or unspecified type diabetes mellitus without mention of complication, not stated as uncontrolled, and Urinary incontinence. Social History:   reports that she has quit smoking. She smoked 0.10 packs per day. She does not have any smokeless tobacco history on file. She reports that she does not drink alcohol or use drugs. Family History:   Family History   Problem Relation Age of Onset    Stroke Father     Heart Disease Other        Vitals:  BP (!) 145/73   Pulse 75   Temp 98.2 °F (36.8 °C) (Oral)   Resp 16   Ht 5' 1\" (1.549 m)   Wt (!) 304 lb 3.2 oz (138 kg)   SpO2 98%   BMI 57.48 kg/m²   Temp (24hrs), Av.1 °F (36.7 °C), Min:97.9 °F (36.6 °C), Max:98.3 °F (36.8 °C)    Recent Labs     20  0829 20  1349 20  1604 20  2037   POCGLU 200* 263* 190* 178*       I/O (24Hr):     Intake/Output Summary (Last 24 hours) at 20207  Last data filed at 2020 1200  Gross per 24 hour   Intake 3511 ml   Output 1300 ml   Net 2211 ml       Labs:  Hematology:  Recent Labs     20  0513 01/10/20  1432 20  0520   WBC 8.1 8.0 9.9   RBC 3.18* 3.17* 3.01*   HGB 9.4* 9.4* 8.9*   HCT 30.2* 31.1* 28.0*   MCV 95.0 98.1 93.0   MCH 29.6 29.7 29.6   MCHC 31.1 30.2 31.8   RDW 13.5 13.7 13.7    370 319   MPV 10.6 10.1 10.7   .6*  --  145.1*     Chemistry:  Recent Labs     20  0513 01/10/20  1432 20  0520    138 137   K 3.7 4.2 4.6    106 107   CO2 25 21 21   GLUCOSE 126* 224* 221*   BUN 17 17 15   CREATININE 1.31* 1.20* 1.24*   ANIONGAP 10 11 9   LABGLOM 44* 49* 47*   GFRAA 53* 59* 57*   CALCIUM 8.2* 7.9* 7.8*   PHOS 3.9  --  3.6     Recent Labs     20  0513  01/10/20  1540 01/10/20  2056 20  0520 20  0829 20  1349 20  1604 20  203   PROT  --   --   --   --  6.2*  --   --   --   --    LABALBU 1.6*  --   --   -- need to remain on Eliquis, as she is in a hypercoaguable state with her nephrotic range proteinuria. 7. Check labs in AM - if stable OK for discharge  8. Home meds reconciled; I suspect that she will have improved glucoses and blood pressures over the next 24 hours  9. ISS and Lantus - Will restart 80 units BID as she takes at home. 10. Eliquis for DVT ppx (hx of DVT) - resume at discretion of podiatry  11. Discharge planning - discharge in AM if labs and vitals are stable.     Roque Form, DO  1/11/2020  9:47 PM

## 2020-01-12 NOTE — PLAN OF CARE
Problem: Falls - Risk of:  Goal: Will remain free from falls  Description  Will remain free from falls  1/12/2020 1031 by Van Diaz RN  Outcome: Ongoing  1/11/2020 2143 by Renato Evans RN  Outcome: Ongoing  Goal: Absence of physical injury  Description  Absence of physical injury  1/12/2020 1031 by Van Diaz RN  Outcome: Ongoing  1/11/2020 2143 by Renato Evans RN  Outcome: Ongoing

## 2020-01-12 NOTE — PROGRESS NOTES
Progress Note  Podiatric Medicine and Surgery     Subjective     CC: R foot diabetic wound    POD#3 s/p TMA w/ ZAIRE, RLE (DOS: 1/9/20). POD #6 s/p I&D w/ R hallux amp (DOS: 1/6/20). Afebrile, VSS. CRP trending down. Feels much better today without episodes of nausea. Denies n/v/f/c, relates R foot pain controlled w/ current regimen. Denies issue w/ UOP/BM, tolerating PO diet. HPI :  Chalino Youngblood is a 38 y.o. female with a past medical history of DM2 with neuropathy was seen at [facility] for right foot pain and wound. Pt states that about 1 week ago her right great toe became very painful and this morning she noted worsening pain and new redness of her right great toe. Pt denies any trauma or stepping on anything sharp. She notes her redness progressively has moved up towards her ankle. Patient is originally from [place] and has moved here, she currently does not have her insulin. Pt is also complaining of calf pain. She has a history of a DVT and PE. Pt denies any N/V/F/C/CP/SOB.     PCP is KAYLEE Wagner - CNP    ROS: Denies N/V/F/C/SOB/CP. Otherwise negative except at stated in the HPI.      Medications:  Scheduled Meds:   amLODIPine  10 mg Oral Daily    atorvastatin  40 mg Oral Daily    DULoxetine  60 mg Oral Daily    escitalopram  20 mg Oral Daily    gabapentin  300 mg Oral QAM    gabapentin  900 mg Oral QPM    levothyroxine  100 mcg Oral Daily    insulin glargine  80 Units Subcutaneous BID    [START ON 1/13/2020] metFORMIN  1,000 mg Oral BID WC    carvedilol  25 mg Oral BID WC    lisinopril  40 mg Oral Daily    fluconazole  200 mg Oral Daily    amoxicillin-clavulanate  1 tablet Oral 3 times per day    docusate sodium  100 mg Oral BID    miconazole   Topical BID    sodium chloride flush  10 mL Intravenous BID    aspirin  81 mg Oral Daily    apixaban  2.5 mg Oral BID    insulin lispro  0-18 Units Subcutaneous TID WC    insulin lispro  0-9 Units Subcutaneous Nightly Continuous Infusions:   dextrose         PRN Meds:methocarbamol, polyethylene glycol, tiZANidine, HYDROcodone 5 mg - acetaminophen, diphenhydrAMINE, sodium chloride flush, sodium chloride flush, potassium chloride **OR** potassium alternative oral replacement **OR** potassium chloride, magnesium sulfate, magnesium hydroxide, nicotine, ondansetron, acetaminophen, glucose, dextrose, glucagon (rDNA), dextrose    Objective     Vitals:  Patient Vitals for the past 8 hrs:   BP Temp Temp src Pulse Weight   20 0812 (!) 157/100 98.3 °F (36.8 °C) Oral 72 --   20 0600 -- -- -- -- (!) 306 lb 8 oz (139 kg)     Average, Min, and Max for last 24 hours Vitals:  TEMPERATURE:  Temp  Av.3 °F (36.8 °C)  Min: 98.2 °F (36.8 °C)  Max: 98.3 °F (36.8 °C)    RESPIRATIONS RANGE: Resp  Av  Min: 16  Max: 16    PULSE RANGE: Pulse  Av.5  Min: 72  Max: 75    BLOOD PRESSURE RANGE:  Systolic (65LCI), EEQ:355 , Min:145 , TP   ; Diastolic (37ACO), BQQ:74, Min:73, Max:100      PULSE OXIMETRY RANGE: SpO2  Av %  Min: 98 %  Max: 98 %    I/O last 3 completed shifts: In: 900 [P.O.:900]  Out: 800 [Urine:800]    CBC:  Recent Labs     01/10/20  1432 20  0520  0504   WBC 8.0 9.9 8.1   HGB 9.4* 8.9* 9.5*   HCT 31.1* 28.0* 30.9*    319 343   CRP  --  145.1* 144.0*        BMP:  Recent Labs     01/10/20  14320  0520  0504    137 136   K 4.2 4.6 4.7    107 104   CO2 21 21 21   BUN 17 15 20   CREATININE 1.20* 1.24* 1.58*   GLUCOSE 224* 221* 189*   CALCIUM 7.9* 7.8* 8.4*        Coags:  Recent Labs     20  0520   PROT 6.2*       Lab Results   Component Value Date    SEDRATE >130 (H) 2020     Recent Labs     20  0520 20  0504   .1* 144.0*     Lower Extremity Physical Exam: Exam from 20. Dressing intact without evidence of strike through. Vascular: DP and PT pulses are palpable. CFT <3 seconds to TMA stump, R.   Hair growth is absent to surgical shoe w/ walker  · Patient is stable for discharge from podiatry standpoint with PO abx per ID recommendations. She is anticipating returning to [place] for follow up. ? Discussed w/ pt that R foot surgical post-op course involves weekly outpatient follow-up w/ suture removal ~3 weeks, Strict NWB until complete healing followed by gradual protected WBAT & prosthetist consult for custom shoes  ? Patient voiced understanding & plans to work w/ Case Management to determine if her out-of-state insurance would allow her to have post-op f/u w/ Dr. Ming Graham, otherwise she will find a podiatrist in [place]  ? She will follow up in [place] however if she is unable to follow up within 1 week of discharge she will need to follow up with Dr. Ming Graham for dressing change and eval.   ? KHALIF/discharge instructions & f/u in chart  · No further podiatric surgical intervention planned at this time. Podiatry will sign off. Please [employer] with any further questions or concerns. · Discussed with Dr. Mary Espinoza DPM   Podiatric Medicine & Surgery   1/12/2020 at 1:14 PM     I performed a history and physical examination of the patient and discussed management with the resident. I reviewed the residents note and agree with the documented findings and plan of care. Any areas of disagreement are noted on the chart. I was personally present for the key portions of any procedures. I have documented in the chart those procedures where I was not present during the key portions. I have reviewed the Podiatry Resident progress note. I agree with the chief complaint, past medical history, past surgical history, allergies, medications, social and family history as documented unless otherwise noted below. Documentation of the HPI, Physical Exam and Medical Decision Making performed by medical students or scribes is based on my personal performance of the HPI, PE and MDM.  I have personally evaluated this patient and have

## 2020-01-12 NOTE — PLAN OF CARE
Laura Liu was evaluated today and a DME order was entered for a wheeled walker with seat because she requires this to successfully complete daily living tasks of eating, bathing, toileting, personal cares, ambulating, grooming, hygiene, dressing upper body, dressing lower body and meal preparation. A wheeled walker with seat is necessary due to the patient's unsteady gait, upper body weakness, inability to  and ambulation device, ambulating only short distances by pushing a walker, and the need to sit for a short time before resuming ambulation. These tasks cannot be completed with a lesser ambulation device such as a cane, crutch, or standard walker. The need for this equipment was discussed with the patient and she understands and is in agreement.       Ean Lopez, Anmol Nielsen

## 2020-01-12 NOTE — PLAN OF CARE
Mobility will improve to maximum level  Description  Mobility will improve to maximum level  Outcome: Ongoing     Problem: Pain:  Goal: Control of acute pain  Description  Control of acute pain  Outcome: Ongoing  Goal: Control of chronic pain  Description  Control of chronic pain  Outcome: Ongoing     Problem: Skin Integrity - Impaired:  Goal: Will show no infection signs and symptoms  Description  Will show no infection signs and symptoms  Outcome: Ongoing  Goal: Absence of new skin breakdown  Description  Absence of new skin breakdown  Outcome: Ongoing     Problem: Nutrition  Goal: Optimal nutrition therapy  Outcome: Ongoing

## 2020-01-12 NOTE — PROGRESS NOTES
reports that she has quit smoking. She smoked 0.10 packs per day. She does not have any smokeless tobacco history on file. She reports that she does not drink alcohol or use drugs. Family History:   Family History   Problem Relation Age of Onset    Stroke Father     Heart Disease Other        Vitals:  BP (!) 157/100   Pulse 72   Temp 98.3 °F (36.8 °C) (Oral)   Resp 16   Ht 5' 1\" (1.549 m)   Wt (!) 306 lb 8 oz (139 kg)   SpO2 98%   BMI 57.91 kg/m²   Temp (24hrs), Av.3 °F (36.8 °C), Min:98.2 °F (36.8 °C), Max:98.3 °F (36.8 °C)    Recent Labs     20  1349 20  1604 20  2037 20  0813   POCGLU 263* 190* 178* 173*       I/O (24Hr):     Intake/Output Summary (Last 24 hours) at 2020 1043  Last data filed at 2020 0900  Gross per 24 hour   Intake 900 ml   Output 800 ml   Net 100 ml       Labs:  Hematology:  Recent Labs     01/10/20  1432 20  0520  0504   WBC 8.0 9.9 8.1   RBC 3.17* 3.01* 3.08*   HGB 9.4* 8.9* 9.5*   HCT 31.1* 28.0* 30.9*   MCV 98.1 93.0 100.3   MCH 29.7 29.6 30.8   MCHC 30.2 31.8 30.7   RDW 13.7 13.7 14.4    319 343   MPV 10.1 10.7 10.3   CRP  --  145.1* 144.0*     Chemistry:  Recent Labs     01/10/20  1432 20  0520  0504    137 136   K 4.2 4.6 4.7    107 104   CO2 21 21 21   GLUCOSE 224* 221* 189*   BUN 17 15 20   CREATININE 1.20* 1.24* 1.58*   ANIONGAP 11 9 11   LABGLOM 49* 47* 35*   GFRAA 59* 57* 43*   CALCIUM 7.9* 7.8* 8.4*   PHOS  --  3.6 4.2     Recent Labs     01/10/20  2056 01/11/20  0520 20  0829 20  1349 20  1604 20  2037 20  0504 20  0813   PROT  --  6.2*  --   --   --   --   --   --    LABALBU  --  1.3*  1.1*  --   --   --   --  1.4*  --    AST  --  20  --   --   --   --   --   --    ALT  --  12  --   --   --   --   --   --    ALKPHOS  --  177*  --   --   --   --   --   --    BILITOT  --  <0.10*  --   --   --   --   --   --    BILIDIR  --  <0.08  --   --   --   -- --   --    POCGLU 212*  --  200* 263* 190* 178*  --  173*     ABG:  Lab Results   Component Value Date    FIO2 INFORMATION NOT PROVIDED 03/21/2015     Lab Results   Component Value Date/Time    SPECIAL NOT REPORTED 12/31/2019 12:22 PM    SPECIAL NOT REPORTED 12/31/2019 12:22 PM     Lab Results   Component Value Date/Time    CULTURE DUPLICATE ORDER 73/95/7827 12:22 PM       Radiology:  Montse Villeda Foot Right (min 3 Views)    Result Date: 1/6/2020  1. Soft tissue swelling and gas in the great toe, extending to the level of the 1st metatarsal head, concerning for gas gangrene. 2.  No radiographic evidence of osteomyelitis. Us Renal Complete    Result Date: 1/6/2020  Unremarkable ultrasound of the kidneys and urinary bladder. Mri Foot Right W Wo Contrast    Result Date: 1/2/2020  1. No evidence of osteomyelitis in the right forefoot. 2. Focal confluent soft tissue edema at the plantar aspect of the proximal great toe, possibly focal cellulitis or phlegmon. No definite organized fluid collection to indicate abscess at this time. This area measures approximately 1.9 x 0.8 x 2.1 cm. 3. A couple low signal foci are seen within the plantar soft tissues of the proximal great toe, favored to be vascular structures but possibility of soft tissue gas is not entirely excluded. 4. Small joint effusion at the 1st MTP joint. Physical Examination:        General appearance:  alert, cooperative and no distress, morbidly obese female  Mental Status:  oriented to person, place and time and normal affect  Lungs:  clear to auscultation bilaterally, normal effort  Heart:  regular rate and rhythm, no murmur  Abdomen:  soft, nontender, nondistended, normal bowel sounds, no masses, hepatomegaly, splenomegaly  Extremities:  no edema, redness, tenderness in the calves; right foot currently wrapped in gauze and ACE-bandage; erythema is significantly improved today as it is not seen demarcating above the level of the ACE wrap;  Ace wrap is significantly smaller in size today  Skin: no major lesions outside of the above noted right great toe/forefoot    Assessment:        Hospital Problems           Last Modified POA    * (Principal) Cellulitis of right foot 1/12/2020 Yes    Renovascular hypertension 1/12/2020 Yes    Diabetic polyneuropathy ([address] 1/12/2020 Yes    Type 2 diabetes mellitus with neurologic complication, with long-term current use of insulin ([address] 1/12/2020 Yes    Chronic diastolic congestive heart failure ([address] 1/12/2020 Yes    Type 2 diabetes mellitus with right diabetic foot infection ([address] 1/12/2020 Yes    Other proteinuria 1/12/2020 Yes    Hypoalbuminemia 1/12/2020 Yes    CKD (chronic kidney disease), stage III ([address] 1/12/2020 Yes    Obesity, morbid, BMI 50 or higher ([facility] 1/12/2020 Yes    Hypothyroidism 1/12/2020 Yes    CAROLYN (acute kidney injury) ([facility] 1/12/2020 Yes    Cellulitis of foot 1/12/2020 Yes    Septic arthritis of right foot ([address] 1/12/2020 Yes    Equinus contracture of right ankle 1/12/2020 Yes          Plan:        1. Appreciate podiatry and infectious disease recommendations  2. S/p I&D with R hallux amputation on 1/6  3. S/p TMA with ZAIRE, RLE on 1/9  4. Per podiatry: Strict NWB to R foot in surgical shoe w/ walker; SAMARA drain removed on 1/11. Strict NWB until complete healing followed by gradual protected WBAT & prosthetist consult for custom shoes  5. Per ID DC IV Abx - start augmentin 500 TID through 1-25; diflucan 200 daily for same duration  6. Monitor renal function -renal function slightly elevated today. Patient with a significant amount of proteinuria and a very low albumin. This is likely secondary to diabetic nephropathy, as she has been without her medications for some time. She will need to make sure she follows up with a nephrologist in the outpatient setting. She will also need to remain on Eliquis, as she is in a hypercoaguable state with her nephrotic range proteinuria.   7. We will have patient have renal function tested next week. 8. Home meds reconciled; blood pressure and blood sugars are improved  9. ISS and Lantus -continue 80 units BID as she takes at home. 10. Eliquis for DVT ppx (hx of DVT) - resume at discretion of podiatry  11. Discharge planning - okay for discharge today. She will need to follow-up with a podiatrist in [place].     33 Ceci Gonzales DO  1/12/2020  10:43 AM

## 2020-01-12 NOTE — PROGRESS NOTES
progressed and her foot was swollen and tender so she presented to the ED for further evaluation. Pt also has a history of CAD with multiple stents in place, PE and IDDM. She reports that she lost her insurance 6 months ago and has not been taking her Brilinta, Eliquis or Insulin as a result. She denies any chills, fevers, night sweats or malaise prior to presenting to the ED. In the ED she was found to have abscess at the base of the Rt great toe with erythema and fluctuance noted. The wound did not penetrate to the bone, no induration or crepitus noted. , .5    Pt was admitted, started on Zosyn and Vancomycin, and seen by podiatry, and I&D was performed with serosanguinous drainage noted. MRI's of the foot showed:  No osteomyelitis or other acute osseous abnormality of the hindfoot. 2. Circumferential subcutaneous edema about the ankle and along the dorsum of   the midfoot compatible with bland edema versus cellulitis.  No deep soft   tissue ulceration or drainable fluid collection. 1. No evidence of osteomyelitis in the right forefoot. 2. Focal confluent soft tissue edema at the plantar aspect of the proximal   great toe, possibly focal cellulitis or phlegmon.  No definite organized   fluid collection to indicate abscess at this time.  This area measures   approximately 1.9 x 0.8 x 2.1 cm.   3. A couple low signal foci are seen within the plantar soft tissues of the   proximal great toe, favored to be vascular structures but possibility of soft   tissue gas is not entirely excluded. 4. Small joint effusion at the 1st MTP joint. Wound cultures are showing Grp B strep and Candida. 1-6 pt to OR for Rt hallux amputation and I&D    1-8 Wound is clean, plan for TMA on 1-10-20  Staph isolate from wound is methicillin sensitive.  Stop Ceftaroline and resume Ceftriaxone - stop date remains 1-15-20    CURRENT EXAMINATION: 1/12/2020    Afebrile  VS stable    The patient seen meetings of clubs or organizations: Not on file     Relationship status: Not on file    Intimate partner violence:     Fear of current or ex partner: Not on file     Emotionally abused: Not on file     Physically abused: Not on file     Forced sexual activity: Not on file   Other Topics Concern    Not on file   Social History Narrative    Not on file       Family History:     Family History   Problem Relation Age of Onset    Stroke Father     Heart Disease Other         Allergies:   Patient has no known allergies. Review of Systems:   Constitutional: No fevers or chills. No systemic complaints. Feeling better today  Head: No headaches  Eyes: No double vision or blurry vision. No conjunctival inflammation. ENT: No sore throat or runny nose. . No hearing loss, tinnitus or vertigo. Cardiovascular: No chest pain or palpitations. No shortness of breath. No TORRES  Lung: No shortness of breath or cough. No sputum production  Abdomen: Mild nausea, no vomiting, diarrhea, or abdominal pain. [place] Kristyn No cramps. Genitourinary: No increased urinary frequency, or dysuria. No hematuria. No suprapubic or CVA pain  Musculoskeletal: No muscle aches or pains. No joint effusions, swelling or deformities. Pain controlled  Hematologic: No bleeding or bruising. Neurologic: No headache, weakness, numbness, or tingling. Integument: No rash, no ulcers. Psychiatric: No depression. Endocrine: No polyuria, no polydipsia, no polyphagia. Physical Examination :     No data found. General Appearance: Awake, alert, and in no apparent distress  Head:  Normocephalic, no trauma  Eyes: Pupils equal, round, reactive to light and accommodation; extraocular movements intact; sclera anicteric; conjunctivae pink. No embolic phenomena. ENT: Oropharynx clear, without erythema, exudate, or thrush. No tenderness of sinuses. Mouth/throat: mucosa pink and moist. No lesions. Dentition in good repair. Neck:Supple, without lymphadenopathy.  Thyroid normal, No bruits. Pulmonary/Chest: Clear to auscultation, without wheezes, rales, or rhonchi. No dullness to percussion. Cardiovascular: Regular rate and rhythm without murmurs, rubs, or gallops. Distant heart tones  Abdomen: Soft, non tender. Bowel sounds normal. No organomegaly  All four Extremities: No cyanosis, clubbing, edema, or effusions. Neurologic: No gross sensory or motor deficits. Skin: Warm and dry with good turgor. With signs of peripheral arterial or venous insufficiency. Rt foot dressing clean and dry    Medical Decision Making -Laboratory:   I have independently reviewed/ordered the following labs:    CBC with Differential:   Recent Labs     01/11/20 0520 01/12/20  0504   WBC 9.9 8.1   HGB 8.9* 9.5*   HCT 28.0* 30.9*    343   LYMPHOPCT 16* 22*   MONOPCT 7 7     BMP:   Recent Labs     01/11/20  0520 01/12/20  0504    136   K 4.6 4.7    104   CO2 21 21   BUN 15 20   CREATININE 1.24* 1.58*     Hepatic Function Panel:   Recent Labs     01/11/20 0520 01/12/20  0504   PROT 6.2*  --    LABALBU 1.3*  1.1* 1.4*   BILIDIR <0.08  --    IBILI CANNOT BE CALCULATED  --    BILITOT <0.10*  --    ALKPHOS 177*  --    ALT 12  --    AST 20  --      No results for input(s): RPR in the last 72 hours. No results for input(s): HIV in the last 72 hours. No results for input(s): BC in the last 72 hours.   Lab Results   Component Value Date    MUCUS NOT REPORTED 01/07/2020    RBC 3.08 01/12/2020    TRICHOMONAS NOT REPORTED 01/07/2020    WBC 8.1 01/12/2020    YEAST NOT REPORTED 01/07/2020    TURBIDITY TURBID 01/07/2020     Lab Results   Component Value Date    CREATININE 1.58 01/12/2020    GLUCOSE 189 01/12/2020       Medical Decision Making-Imaging:     EXAMINATION:   MRI OF THE RIGHT HINDFOOT WITHOUT AND WITH CONTRAST, 12/31/2019 4:22 pm       TECHNIQUE:   Multiplanar multisequence MRI of the right foot was performed without and   with the administration of intravenous contrast.       COMPARISON: Right foot radiographs 12/30/2019.       HISTORY:   ORDERING SYSTEM PROVIDED HISTORY: right foot wound   TECHNOLOGIST PROVIDED HISTORY:   right foot wound   What is the area of interest?->Forefoot   Is the patient pregnant?->No       The patient reported to the MRI tech that she had complaints involving the   heel and midfoot so a hindfoot study was performed.  The clinician has   requested that the study be repeated as a forefoot.       FINDINGS:   PLANTAR FASCIA: Mild chronic thickening of the central cord of the plantar   fascia.  No acute plantar fasciitis or plantar fascia tear.  Severe fatty   atrophy of the abductor digiti minimi muscle.       ACHILLES TENDON: The Achilles tendon is normal in position, morphology and   signal.  No associated bursitis.       BONE MARROW: No fracture or dislocation.  No abnormal marrow space-occupying   lesion.  No osseous erosion.       JOINT SPACES: The tibiotalar and subtalar joints are normal in appearance.    The talonavicular and calcaneocuboid joints are unremarkable.  The midfoot is   unremarkable.  No joint effusion.       SYNDESMOTIC LIGAMENTS: The anterior and posterior inferior tibiofibular   ligaments are intact.       LATERAL COLLATERAL LIGAMENT COMPLEX: The anterior talofibular ligament,   calcaneofibular ligament and posterior talofibular ligaments are without   acute abnormality.       DELTOID LIGAMENT COMPLEX:  The superficial and deep components of the deltoid   ligament complex are normal.       SINUS TARSI AND SPRING LIGAMENT: The sinus tarsi fat plug is grossly   preserved.  The spring ligament complex is intact.       MEDIAL TENDONS: The posterior tibialis, flexor digitorum longus and flexor   hallucis longus tendons are intact.       LATERAL TENDONS:  The peroneus longus and brevis tendons are intact.       ANTERIOR TENDONS: The tibialis anterior, extensor hallucis longus and   extensor digitorum longus tendons are normal in position, morphology and intermetatarsal bursa. No abnormal intermetatarsal enhancing mass.       There is mild increased signal within the intrinsic muscles of the foot. There is mild scattered fatty atrophy of the intrinsic muscles.       The visualized portions of the flexor and extensor tendons are intact.           Impression   1. No evidence of osteomyelitis in the right forefoot. 2. Focal confluent soft tissue edema at the plantar aspect of the proximal   great toe, possibly focal cellulitis or phlegmon.  No definite organized   fluid collection to indicate abscess at this time.  This area measures   approximately 1.9 x 0.8 x 2.1 cm.   3. A couple low signal foci are seen within the plantar soft tissues of the   proximal great toe, favored to be vascular structures but possibility of soft   tissue gas is not entirely excluded. 4. Small joint effusion at the 1st MTP joint. Medical Decision [id number]:   1/7/2020  5:41 PM - Sammy, Ruben Incoming Lab Results From [employer]     Specimen Information: Foot        Component Collected Lab   Specimen Description 12/31/2019 12:21  [address]   . FOOT RIGHT    Special Requests 12/31/2019 12:21  [address]   NOT REPORTED    Direct Exam 12/31/2019 12:21  [address]   NO NEUTROPHILS SEEN    Direct Exam Abnormal  12/31/2019 12:21 PM [address] [place]    Direct Exam Abnormal  12/31/2019 12:21 PM [id number]    Culture Abnormal  12/31/2019 12:21 PM [address] MODERATE GROWTH    Culture Abnormal  12/31/2019 12:21 PM [address]    Culture Abnormal  12/31/2019 12:21 PM Kimberlyn Lara This isolate is methicillin susceptible.     Culture 12/31/2019 12:21 PM [facility]

## 2020-01-13 LAB — SURGICAL PATHOLOGY REPORT: NORMAL

## 2020-01-14 ENCOUNTER — OFFICE VISIT (OUTPATIENT)
Dept: PODIATRY | Age: 45
End: 2020-01-14

## 2020-01-14 VITALS — HEIGHT: 61 IN | BODY MASS INDEX: 51.92 KG/M2 | WEIGHT: 275 LBS

## 2020-01-14 PROCEDURE — 99024 POSTOP FOLLOW-UP VISIT: CPT | Performed by: PODIATRIST

## 2020-01-14 RX ORDER — NITROGLYCERIN 0.4 MG/1
0.4 TABLET SUBLINGUAL
COMMUNITY

## 2020-01-14 NOTE — PROGRESS NOTES
[address]  Post Op note  Chief Complaint   Patient presents with    Post-Op Check     INITIAL RIGHT FOOT    Diabetes     BS: 215     Facundo Rouse is a 38y.o. year old female who is 5 day(s) post op from foot surgery. Type: TMA, ZAIRE right. Vital signs are stable. Pain level is 1. Patient denies N/V/F/C. Patient has been compliant with postoperative instructions. Review of Systems    Vascular: DP and PT pulses palpable 2/4, Right Foot and 2/4 on the Left Foot. Edema is present,  Right Foot and absenton the Left Foot. Neurological:   Sensation absent  to light touch to level of digits, both feet. Musculoskeletal:   Muscle strength is 5/5 on the Right Foot and 5/5 on the Left Foot. Structural deformities are present on the Right Foot and present on the Left Foot. Integument:  Warm, dry, supple both feet. Incisions are healing well. Sutures intact, maceration present, medial and lateral  Wound dehiscence is present, but superficial,  Infection is absent. Assesment : Post operative progress gradually improving. Diagnosis Orders   1. Equinus contracture of right ankle     2. Type 2 diabetes mellitus with right diabetic foot infection ([address]     3. Septic arthritis of right foot, due to unspecified organism ([facility]     4. Cellulitis of right foot           Plan: Sutures in place. Dry sterile dressing applied. Keep surgical site dry. Ice and elevation as directed. Bailey compression  No weight  Posterior splint applied  No orders of the defined types were placed in this encounter. Follow up as needed. Patient is from [place] and is going back this week.  She has an appointment with a podiatrist.

## 2020-01-15 ENCOUNTER — TELEPHONE (OUTPATIENT)
Dept: INFECTIOUS DISEASES | Age: 45
End: 2020-01-15

## 2020-01-15 NOTE — TELEPHONE ENCOUNTER
----- Message from Lashell Merchant sent at 1/15/2020  7:12 AM EST -----  Huang Bueno, please see message from Dr Jimmie Andrew and call to schedule.  Thank you.   ----- Message -----  From: Ana Paula Edmondson MD  Sent: 1/14/2020   4:39 PM EST  To: [facility] Clinical Staff    Please schedule for follow up  ----- Message -----  From: Vivian Kim DO  Sent: 1/12/2020   9:50 PM EST  To: Ana Paula Edmondson MD

## 2020-01-15 NOTE — ADT AUTH CERT
wounds      1. S/p TMA w/ ZAIRE, RLE (DOS: 1/9/20)    2. S/p R hallux amputation (DOS 1/6/20)   3. S/p I&D, right foot    4. Diabetic wound to the level of subcutaneous tissue, right hallux   5. Cellulitis, right foot - resolving    6. DM2 with neuropathy   7. H/o DVT   8. Chronic CHF   · ID following - PO Augmentin & PO Diflucan   · RLE post-op dressing changed: Adaptic/DSD/ACE/Posterior splint   · Strict NWB to R foot in surgical shoe w/ walker   · Erythema is improved from previous exam but not completely resolved. Patient is ok for discharge from podiatry standpoint with PO abx per ID recommendations. She is anticipating returning to [place] for follow up. Discussed w/ pt that R foot surgical post-op course involves weekly outpatient follow-up w/ suture removal ~3 weeks, Strict NWB until complete healing followed by gradual protected WBAT & prosthetist consult for custom shoes   Patient voiced understanding & plans to work w/ Case Management to determine if her out-of-state insurance would allow her to have post-op f/u w/ Dr. Claudene Aw, otherwise she will find a podiatrist in [place]   KHALIF/discharge instructions & f/u in chart   =====================================   · Rt great toe diabetic infection   · Foot cellulitis   · Gas formation in tissues. Infection with MSSA, Strep Grp B and Candida. · S/P I&D per Podiatry 12-31   · S/P TMA 1-9-20   · DM II not controlled   · CAD s/p stenting x 2   · Non compliance       Recommendations:       · D/C IV antibiotics   · Augmentin 500 mg po TID. Stop date 1-25-20   · Diflucan 200 mg po daily. Stop date 1-25-20   ======================================   Per medicine:   Vitals stable. ACE wrap on right foot. Episode of nausea and vomiting when I was in room. BP and blood glucoses are elevated. Patient waiting for dispo on her foot drain.       Gastrointestinal: positive for constipation, nausea, vomiting; no further episodes; negative for abdominal pain, , diarrhea Extremities: reports right foot pain   Extremities:  no edema, redness, tenderness in the calves; right foot currently wrapped in gauze and ACE-bandage; erythema is significantly improved today as it is not seen demarcating above the level of the ACE wrap; Ace wrap is significantly smaller in size today   1. Per podiatry: Strict NWB to R foot in surgical shoe w/ walker; planning for SAMARA drain removal tomorrow   2. Per ID DC IV Abx - start augmentin 500 TID through 1-25; diflucan 200 daily for same duration   3. Monitor renal function - patient with a significant amount of proteinuria and a very low albumin. This is likely secondary to diabetic nephropathy, as she has been without her medications for some time. She will need to make sure she follows up with a nephrologist in the outpatient setting. She will also need to remain on Eliquis, as she is in a hypercoaguable state with her nephrotic range proteinuria. 4. Check labs in AM - if stable OK for discharge   5. Home meds reconciled; I suspect that she will have improved glucoses and blood pressures over the next 24 hours   6. ISS and Lantus - Will restart 80 units BID as she takes at home.    7. Eliquis for DVT ppx (hx of DVT) - resume at discretion of podiatry   ================================================    98.2 (36.8)  16  75  145/73Abnormal  -  Semi fowlers  -  -  98   pain 7       Results for Belkys Mullen (MRN [id number]) as of 1/14/2020 14:31      1/11/2020 05:20   Sodium: 137   Potassium: 4.6   Chloride: 107   CO2: 21   BUN: 15   Creatinine: 1.24 (H)   Bun/Cre Ratio: NOT REPORTED   Anion Gap: 9   GFR Non-: 47 (L)   GFR : 57 (L)   Glucose: 221 (H)   Calcium: 7.8 (L)   Phosphorus: 3.6   GFR Comment: Pend   GFR Staging: NOT REPORTED   CRP: 145.1 (H)   Albumin: 1.1 (L)   WBC: 9.9   RBC: 3.01 (L)   Hemoglobin Quant: 8.9 (L)   Hematocrit: 28.0 (L)   MCV: 93.0   MCH: 29.6   MCHC: 31.8   MPV: 10.7   RDW: 13.7   Platelet Count: change then likely ok for dc pending final abx reccs & dc arrangements   ? Discussed w/ pt that R foot surgical post-op course involves weekly outpatient follow-up w/ suture removal ~3 weeks, Strict NWB until complete healing followed by gradual protected WBAT & prosthetist consult for custom shoes      Per id      · Rt great toe diabetic infection   · Foot cellulitis   · Gas gangrene in the right foot s/p Transmetatarsal amputation, right foot and Achilles tendon lengthening, right foot on 1/9/20   · S/P I&D per Podiatry 12-31   · DM II not controlled   · CAD s/p stenting x 2   · Non compliance       Recommendations:       · 1-8 Resume Ceftriaxone 2 gm IV q 24 hr. Stop date 1-15-20   1-8 Stop Ceftaroline 600 mg IV  q 12 hr Staph isolate is methicillin sensitive   · Continue elevation of the leg and foot with 3-4 pillows. · Kerlex and ACE wraps to the foot and ankle. · Diflucan 200 mg po daily discontinued   · Gas gangrene in the right foot s/p Transmetatarsal amputation, right foot and Achilles tendon lengthening, right foot on 1/9/20   · Rt great toe diabetic infection   · Foot cellulitis   · Gas formation in tissues. Infection with MSSA, Strep Grp B and Candida. · S/P I&D per Podiatry 12-31   · S/P TMA 1-9-20   · DM II not controlled   · CAD s/p stenting x 2   · Non compliance       Recommendations:       · D/C IV antibiotics   · Augmentin 500 mg po TID. Stop date 1-25-20   · Diflucan 200 mg po daily. Stop date 1-25-20         Per medicine:   S/p TMA yesterday with podiatry. Patient is in good spirits still. Vitals stable. ACE wrap on right foot.       1. S/p I&D with R hallux amputation on 1/6   2. S/p TMA with ZAIRE, RLE on 1/9   3. Per podiatry: Strict NWB to R foot in surgical shoe w/ walker; planning for SAMARA drain removal tomorrow   4. Per ID DC IV Abx - start augmentin 500 TID through 1-25; diflucan 200 daily for same duration   5.  Monitor renal function - patient with a significant amount of proteinuria and a very low albumin. This is likely secondary to diabetic nephropathy, as she has been without her medications for some time. She will need to make sure she follows up with a nephrologist in the outpatient setting. She will also need to remain on Eliquis, as she is in a hypercoaguable state with her nephrotic range proteinuria. 6. Check labs in AM   7. Discussed with case management today - we need to know if patient will have insurance at discharge. She will need multiple medications, including antiplatelet therapy, anticoagulation, anti-diabetic medications, antibiotics   8. Continue to monitor fluid status in the setting in chronic diastolic heart failure - appears compensated at this moment in time. 9. Hold parameters placed on coreg; resume lisinopril today   10. ISS and Lantus - Will restart 40 units tonight.    11. Eliquis for DVT ppx (hx of DVT) - resume at discretion of podiatry   =====================================      Results for Марина Walsh (MRN [id number]) as of 1/14/2020 14:31      1/10/2020 14:32   Sodium: 138   Potassium: 4.2   Chloride: 106   CO2: 21   BUN: 17   Creatinine: 1.20 (H)   Bun/Cre Ratio: NOT REPORTED   Anion Gap: 11   GFR Non-: 49 (L)   GFR : 59 (L)   Glucose: 224 (H)   Calcium: 7.9 (L)   GFR Comment: Pend   GFR Staging: NOT REPORTED   WBC: 8.0   RBC: 3.17 (L)   Hemoglobin Quant: 9.4 (L)   Hematocrit: 31.1 (L)   MCV: 98.1   MCH: 29.7   MCHC: 30.2   MPV: 10.1   RDW: 13.7   Platelet Count: 070      Results for Марина Walsh (MRN [id number]) as of 1/14/2020 14:31      1/10/2020 07:53   POC Glucose: 202 (H)      1/10/2020 12:20   POC Glucose: 217 (H)      1/10/2020 14:32      1/10/2020 15:40   POC Glucose: 202 (H)      1/10/2020 20:56   POC Glucose: 212 (H)         98.6 (37)  16  81  169/83Abnormal  -  -  -  -  99  None (Room air)  - pain 7       Eliquis, asa, coreg, colace, difucan, lantus, humalog x4, lisinopril, ivf 75 ml/hr , norco x6, dilaudid iv x6,  fsbs 4xd, labs daily, is, carb control diet, bsc, ice to affected area, elevate lower extremity, nwb, telemetry, vs, sp02.                 Diabetes - Care Day 11 (1/9/2020) by Prachi Stevens RN         Review Status Review Entered   Completed 1/10/2020 14:08       Criteria Review      Care Day: 11 Care Date: 1/9/2020 Level of Care:    Guideline Day 2    Level Of Care    (X) Floor    1/10/2020 2:08 PM EST by Ana Collier      ACUTE    Clinical Status    (X) * Hypotension absent    (X) * Mental status at baseline    ( ) * Acidosis absent or improved    ( ) * Blood glucose under acceptable control or improved    ( ) * Dehydration absent    ( ) * Electrolyte abnormalities absent or improved    ( ) * Renal function at baseline or improved    Activity    (X) * Ambulatory    1/10/2020 2:08 PM EST by Ana Collier      UP WITH ASSIST  NWB R LEG    Routes    (X) * Oral hydration, and medications    (X) [place] diet, advance as tolerated    1/10/2020 2:08 PM EST by Ana Collier      CARB CONTROL    Interventions    (X) Complete IV volume, replacement,    1/10/2020 2:08 PM EST by Ana Collier      IVF 75 ML/HR    (X) Bedside glucose monitoring    1/10/2020 2:08 PM EST by Ana Collier      FSBS 4XD    Medications    (X) * IV insulin absent    ( ) * Outpatient diabetic medication regimen initiated    (X) Subcutaneous insulin    1/10/2020 2:08 PM EST by Ana Collier      HUMALOG X4  LANUS    * Milestone   Additional Notes      1/9/2020      PER PODIATRY   POD #3 s/p I&D w/ R hallux amp (DOS: 1/6/20). Patient seen and evaluated in pre-op. Afebrile, VSS, no leukocytosis. Denies n/v/f/c, relates R foot pain controlled. Vascular: DP and PT pulses are palpable.  CFT <3 seconds to all digits.  Hair growth is absent to the level of the digits.  +1 pitting edema to the right foot       Neuro: Saph/sural/SP/DP/plantar sensation diminished to light touch.       Musculoskeletal: Muscle strength is 5/5 to all lower admin of narcotics. Feeling better this morning. Glucoses well controlled this morning. She is scheduled for OR today for either amputation or wound-vac placement. Vitals are stable. Pain is controlled. Continue Rocephin 2 g q 24 hours x 8 doses   Monitor renal function - patient with a significant amount of proteinuria and a very low albumin. This is likely secondary to diabetic nephropathy, as she has been without her medications for some time. She will need to make sure she follows up with a nephrologist in the outpatient setting. Renal function is stable today (creatinine 1.31). She will also need to remain on Eliquis, as she is in a hypercoaguable state with her nephrotic range proteinuria. Continue to monitor fluid status in the setting in chronic diastolic heart failure - appears compensated at this moment in time. Hold parameters placed on coreg; hold lisinopril tonight   ISS and Lantus - giving half dose of Lantus (20 units) tonight again secondary to poor PO intake throughout the day today. Will likely restart 40 units tomorrow. Eliquis for DVT ppx (hx of DVT) - resume at discretion of podiatry      =================================   PER ID   · Rt great toe diabetic infection   · Foot cellulitis   · Gas formation in tissues   · S/P I&D per Podiatry 12-31   · DM II not controlled   · CAD s/p stenting x 2   · Non compliance       Recommendations:       · 1-8 Resume Ceftriaxone 2 gm IV q 24 hr. Stop date 1-15-20   ? 1-8 Stop Ceftaroline 600 mg IV  q 12 hr Staph isolate is methicillin sensitive   · Continue elevation of the leg and foot with 3-4 pillows. · Kerlex and ACE wraps to the foot and ankle.    · Diflucan 200 mg po daily   ====================================   Results for Oc Brandon (MRN [id number]) as of 1/10/2020 13:49      1/9/2020 05:13   Sodium: 142   Potassium: 3.7   Chloride: 107   CO2: 25   BUN: 17   Creatinine: 1.31 (H)   Bun/Cre Ratio: NOT REPORTED   Anion Gap: 10   GFR sensitive   · Continue elevation of the leg and foot with 3-4 pillows. · Kerlex and ACE wraps to the foot and ankle. · Diflucan 200 mg po daily   · Surgical intervention as per Podiatry.   ==================================================   OR tomorrow at 130pm for right foot TMA. NPO at midnight, Hold [facility] tomorrow morning       PER MEDICINE:    Patient is to go to OR tomorrow for possible wound vac placement/debridement vs TMA with podiatry. She remains on antibiotic therapy, which is being guided by infectious disease.  Blood pressure is improved today.  Albumin noted to be low.     1. S/p I&D on 1/5; amputation on 1/6   2. Continue Cefatrolene 600 q 12 h and diflucan 200 q d    3. Monitor renal function - nephrology has signed off; patient with a significant amount of proteinuria and a very low albumin. This is likely secondary to diabetic nephropathy. She will need to make sure she follows up with a nephrologist in the outpatient setting. Renal function is stable. She will also need to remain on Eliquis, as she is in a hypercoaguable state with her nephrotic range proteinuria. 4. Continue to monitor fluid status in the setting in chronic diastolic heart failure - appears compensated at this moment in time. 5. Hold parameters placed on coreg; hold lisinopril tonight   6. ISS and Lantus - giving half dose of Lantus (20 units) tonight   7.  Eliquis for DVT ppx (hx of DVT) - hold after dose tonight         Results for Reva Prado (MRN [id number]) as of 1/10/2020 13:49      1/8/2020 04:33   Sodium: 134 (L)   Potassium: 3.8   Chloride: 102   CO2: 23   BUN: 22 (H)   Creatinine: 1.36 (H)   Bun/Cre Ratio: NOT REPORTED   Anion Gap: 9   GFR Non-: 42 (L)   GFR : 51 (L)   Glucose: 231 (H)   Calcium: 7.9 (L)   Phosphorus: 3.7   GFR Comment: Pend   GFR Staging: NOT REPORTED   CRP: 251.2 (H)   Albumin: 1.3 (L)   WBC: 8.9   RBC: 3.13 (L)   Hemoglobin Quant: 9.4 (L)   Hematocrit: 29.9 (L) MCV: 95.5   MCH: 30.0   MCHC: 31.4   MPV: 11.1   RDW: 13.6   Platelet Count: 514   Platelet Estimate: NOT REPORTED   Absolute Mono #: 0.61   Eosinophils %: 5 (H)   Basophils Absolute: 0.04   Differential Type: NOT REPORTED   Seg Neutrophils: 69 (H)   Segs Absolute: 6.11   Lymphocytes: 19 (L)   Absolute Lymph #: 1.68   Monocytes: 7   Absolute Eos #: 0.43   Basophils: 0   Immature Granulocytes: 0   WBC Morphology: NOT REPORTED      Results for Hiram Lundberg (MRN [id number]) as of 1/10/2020 13:49      1/8/2020 07:51   POC Glucose: 189 (H)      1/8/2020 12:49   POC Glucose: 188 (H)      1/8/2020 17:02   POC Glucose: 188 (H)      1/8/2020 20:54   POC Glucose: 165 (H)         ELIQUIS, ASA, COREG, TEFLARO IV Q12H, ROCEPHIN IV Q24H, NORCO X5, MS IV X3, ZOFRAN IV X3  LABS DAILY, DAILY WT, ELEVATE LIMB UP 2 PILLOWS, FSBS 4XD, EPC, AAT WITH ASSIST                    Diabetes - Care Day 9 (1/7/2020) by Radha Del Rio RN         Review Status Review Entered   Completed 1/10/2020 13:32       Criteria Review      Care Day: 9 Care Date: 1/7/2020 Level of Care:    Guideline Day 2    Level Of Care    (X) Floor    1/10/2020 1:32 PM EST by Lazarus Neither      ACUTE    Clinical Status    (X) * Hypotension absent    (X) * Mental status at baseline    ( ) * Acidosis absent or improved    ( ) * Blood glucose under acceptable control or improved    ( ) * Dehydration absent    ( ) * Electrolyte abnormalities absent or improved    ( ) * Renal function at baseline or improved    Activity    (X) * Ambulatory    1/10/2020 1:32 PM EST by Lazarus Neither      NWB RIGHT LEG    Routes    (X) * Oral hydration, and medications    (X) [place] diet, advance as tolerated    1/10/2020 1:32 PM EST by Lazarus Neither      CAR CONTROL DIET    Interventions    (X) Complete IV volume, replacement,    1/10/2020 1:32 PM EST by Lazarus Neither      IVF 75 ML/HR  MORPHINE IV X5    (X) Bedside glucose monitoring    1/10/2020 1:32 PM EST by Lazarus Neither      FSBS EPC, AAT WITH ASSIST. Results for Sharon January (MRN [id number]) as of 1/10/2020 13:49      1/7/2020 03:30   Color, UA: YELLOW   Turbidity UA: TURBID (A)   Glucose, UA: 2+ (A)   Bilirubin, Urine: NEGATIVE   Ketones, Urine: NEGATIVE   Specific Gravity, UA: 1.018   pH, UA: 5.5   Protein, UA: 3+ (A)   Urobilinogen, Urine: Normal   Nitrite, Urine: NEGATIVE   Leukocyte Esterase, Urine: NEGATIVE   Urinalysis Comments: NOT REPORTED   Casts UA: 5 TO 10 HYALINE Reference range defined for non-centrifuged specimen.    Mucus, UA: NOT REPORTED   WBC, UA: 5 TO 10   RBC, UA: 20 TO 50   Epi Cells: 2 TO 5   Renal Epithelial, Urine: NOT REPORTED   Bacteria, UA: NOT REPORTED   Amorphous, UA: NOT REPORTED   Yeast, Urine: NOT REPORTED   Crystals: NOT REPORTED   Urine Hgb: LARGE (A)   Trichomonas, UA: NOT REPORTED   Other Observations UA: NOT REPORTED   Creatinine, Ur: 59.9      1/7/2020 05:01   Sodium: 133 (L)   Potassium: 3.3 (L)   Chloride: 99   CO2: 21   BUN: 23 (H)   Creatinine: 1.31 (H)   Bun/Cre Ratio: NOT REPORTED   Anion Gap: 13   GFR Non-: 44 (L)   GFR : 53 (L)   Glucose: 260 (H)   Calcium: 7.9 (L)   GFR Comment: Pend   GFR Staging: NOT REPORTED   CRP: 331.7 (H)   WBC: 10.0   RBC: 3.03 (L)   Hemoglobin Quant: 9.1 (L)   Hematocrit: 29.3 (L)   MCV: 96.7   MCH: 30.0   MCHC: 31.1   MPV: 10.7   RDW: 13.5   Platelet Count: 995   Platelet Estimate: NOT REPORTED   Absolute Mono #: 0.78   Eosinophils %: 3   Basophils Absolute: 0.03   Differential Type: NOT REPORTED   Seg Neutrophils: 74 (H)   Segs Absolute: 7.36   Lymphocytes: 15 (L)   Absolute Lymph #: 1.51   Monocytes: 8   Absolute Eos #: 0.27   Basophils: 0   Immature Granulocytes: 0   WBC Morphology: NOT REPORTED   RBC Morphology:  [address]

## 2020-11-03 PROBLEM — I10 ESSENTIAL HYPERTENSION: Status: RESOLVED | Noted: 2020-11-03 | Resolved: 2020-11-03

## 2022-05-13 NOTE — ANESTHESIA POSTPROCEDURE EVALUATION
Department of Anesthesiology  Postprocedure Note    Patient: Darwin Reyna  MRN: [id number]  YOB: 1975  Date of evaluation: 1/9/2020  Time:  8:29 PM     Procedure Summary     Date:  01/09/20 Room / Location:  [address]    Anesthesia Start:  1410 Anesthesia Stop:  1626    Procedure:  TRANSMETATARSAL AMPUTATION FOOT, ACHILLES TENDON LENGTHENING (Right ) Diagnosis:  (DIABETIC INFECTION)    Surgeon:  Neela Lazo DPM Responsible Provider:  Meryl Post MD    Anesthesia Type:  general ASA Status:  3          Anesthesia Type: general    Rodriguez Phase I: Rodriguez Score: 10    Rodriguez Phase II: Rodriguez Score: 10    Last vitals: Reviewed and per EMR flowsheets.    POST-OP ANESTHESIA NOTE       BP (!) 151/76   Pulse 71   Temp 97.5 °F (36.4 °C) (Oral)   Resp 16   Ht 5' 1\" (1.549 m)   Wt 295 lb 12.8 oz (134.2 kg)   SpO2 100%   BMI 55.89 kg/m²    Pain Assessment: 0-10  Pain Level: 8       Anesthesia Post Evaluation    Patient location during evaluation: PACU  Patient participation: complete - patient participated  Level of consciousness: awake  Pain score: 8  Airway patency: patent  Nausea & Vomiting: no vomiting and no nausea  Complications: no  Cardiovascular status: hemodynamically stable  Respiratory status: acceptable  Hydration status: stable
swelling of lower extremities

## (undated) DEVICE — BANDAGE,GAUZE,BULKEE II,4.5"X4.1YD,STRL: Brand: MEDLINE

## (undated) DEVICE — SOLUTION SCRB 4OZ 4% CHG H2O AIDED FOR PREOPERATIVE SKIN

## (undated) DEVICE — TOURNIQUET CUF BLD PRESSURE 4X18 IN 2 PRT SINGLE BLDR RED

## (undated) DEVICE — Z DISCONTINUED BY MEDLINE USE 2711682 TRAY SKIN PREP DRY W/ PREM GLV

## (undated) DEVICE — THIN OFFSET (9.0 X 0.38 X 25.0MM)

## (undated) DEVICE — SUTURE VCRL SZ 4-0 L18IN ABSRB UD VCRL POLYGLACTIN 910 COAT J109T

## (undated) DEVICE — GLOVE SURG SZ 65 THK91MIL LTX FREE SYN POLYISOPRENE

## (undated) DEVICE — BNDG,ELSTC,MATRIX,STRL,6"X5YD,LF,HOOK&LP: Brand: MEDLINE

## (undated) DEVICE — GLOVE ORANGE PI 8 1/2   MSG9085

## (undated) DEVICE — TOWEL,OR,DSP,ST,NATURAL,DLX,4/PK,20PK/CS: Brand: MEDLINE

## (undated) DEVICE — SUTURE VIC + BR UD PS2 4-0 18IN VCP496G

## (undated) DEVICE — GLOVE ORANGE PI 7 1/2   MSG9075

## (undated) DEVICE — GLOVE ORANGE PI 8   MSG9080

## (undated) DEVICE — INTENDED FOR TISSUE SEPARATION, AND OTHER PROCEDURES THAT REQUIRE A SHARP SURGICAL BLADE TO PUNCTURE OR CUT.: Brand: BARD-PARKER ® CARBON RIB-BACK BLADES

## (undated) DEVICE — SUTURE NONABSORBABLE MONOFILAMENT 3-0 PS-1 18 IN BLK ETHILON 1663H

## (undated) DEVICE — PADDING CAST W6INXL4YD COT LO LINTING WYTEX

## (undated) DEVICE — CULTURETTE AERO/ANEROBIC RED/BLUE BUNDLE

## (undated) DEVICE — 10 FRENCH DRAIN SYSTEM, STERILE: Brand: TLS

## (undated) DEVICE — TUBE IRRIG HNDPC HI FLO TP INTRPULS W/SUCTION TUBE

## (undated) DEVICE — GLOVE SURG SZ 7 L12IN THK7.5MIL DK GRN LTX FREE MSG6570] MEDLINE INDUSTRIES INC]

## (undated) DEVICE — PAD,ABDOMINAL,5"X9",ST,LF,25/BX: Brand: MEDLINE INDUSTRIES, INC.

## (undated) DEVICE — GLOVE ORANGE PI 7   MSG9070

## (undated) DEVICE — GOWN,AURORA,NONRNF,XL,30/CS: Brand: MEDLINE

## (undated) DEVICE — PADDING,UNDERCAST,COTTON, 4"X4YD STERILE: Brand: MEDLINE

## (undated) DEVICE — CONNECTOR,TUBING,5-IN-1,NON-STERILE: Brand: MEDLINE INDUSTRIES, INC.

## (undated) DEVICE — SPLINT ORTH W5XL30IN WHT FBRGLS 1 SIDE FELT PD CNFRM LO

## (undated) DEVICE — THIN OFFSET (9.0 X 0.38 X 31.0MM)

## (undated) DEVICE — SVMMC POD PK

## (undated) DEVICE — COVER XR CASS W24XL25IN CLR POLY FLM DRAPEABLE W REL LNR

## (undated) DEVICE — ZIMMER® STERILE DISPOSABLE TOURNIQUET CUFF WITH PROTECTIVE SLEEVE AND PLC, DUAL PORT, SINGLE BLADDER, 42 IN. (107 CM)